# Patient Record
Sex: MALE | Race: WHITE | NOT HISPANIC OR LATINO | Employment: FULL TIME | URBAN - METROPOLITAN AREA
[De-identification: names, ages, dates, MRNs, and addresses within clinical notes are randomized per-mention and may not be internally consistent; named-entity substitution may affect disease eponyms.]

---

## 2018-04-23 ENCOUNTER — OFFICE VISIT (OUTPATIENT)
Dept: PRIMARY CARE | Facility: CLINIC | Age: 63
End: 2018-04-23
Payer: COMMERCIAL

## 2018-04-23 ENCOUNTER — APPOINTMENT (OUTPATIENT)
Dept: LAB | Facility: HOSPITAL | Age: 63
End: 2018-04-23
Attending: INTERNAL MEDICINE
Payer: COMMERCIAL

## 2018-04-23 VITALS
BODY MASS INDEX: 28.2 KG/M2 | HEIGHT: 70 IN | DIASTOLIC BLOOD PRESSURE: 90 MMHG | OXYGEN SATURATION: 99 % | RESPIRATION RATE: 17 BRPM | WEIGHT: 197 LBS | TEMPERATURE: 97.7 F | SYSTOLIC BLOOD PRESSURE: 140 MMHG | HEART RATE: 73 BPM

## 2018-04-23 DIAGNOSIS — E89.0 POSTABLATIVE HYPOTHYROIDISM: ICD-10-CM

## 2018-04-23 DIAGNOSIS — E11.9 DIABETES MELLITUS WITHOUT COMPLICATION (CMS/HCC): Primary | ICD-10-CM

## 2018-04-23 DIAGNOSIS — I10 ESSENTIAL HYPERTENSION: ICD-10-CM

## 2018-04-23 DIAGNOSIS — E11.9 DIABETES MELLITUS WITHOUT COMPLICATION (CMS/HCC): ICD-10-CM

## 2018-04-23 DIAGNOSIS — E78.00 PURE HYPERCHOLESTEROLEMIA: ICD-10-CM

## 2018-04-23 LAB
ALBUMIN SERPL-MCNC: 4.2 G/DL (ref 3.4–5)
ALP SERPL-CCNC: 52 IU/L (ref 35–126)
ALT SERPL-CCNC: 32 IU/L (ref 16–63)
ANION GAP SERPL CALC-SCNC: 8 MEQ/L (ref 3–15)
AST SERPL-CCNC: 33 IU/L (ref 15–41)
BILIRUB SERPL-MCNC: 0.3 MG/DL (ref 0.3–1.2)
BUN SERPL-MCNC: 14 MG/DL (ref 8–20)
CALCIUM SERPL-MCNC: 9.1 MG/DL (ref 8.9–10.3)
CHLORIDE SERPL-SCNC: 105 MMOL/L (ref 98–109)
CHOLEST SERPL-MCNC: 197 MG/DL
CO2 SERPL-SCNC: 28 MMOL/L (ref 22–32)
CREAT SERPL-MCNC: 1 MG/DL (ref 0.8–1.3)
EST. AVERAGE GLUCOSE BLD GHB EST-MCNC: 151 MG/DL
GFR SERPL CREATININE-BSD FRML MDRD: >60 ML/MIN/1.73M*2
GLUCOSE SERPL-MCNC: 94 MG/DL (ref 70–99)
HBA1C MFR BLD HPLC: 6.9 %
HDLC SERPL-MCNC: 35 MG/DL
HDLC SERPL: 5.6 {RATIO}
LDLC SERPL CALC-MCNC: 140 MG/DL
NONHDLC SERPL-MCNC: 162 MG/DL
POTASSIUM SERPL-SCNC: 4.2 MMOL/L (ref 3.6–5.1)
PROT SERPL-MCNC: 7.5 G/DL (ref 6–8.2)
SODIUM SERPL-SCNC: 141 MMOL/L (ref 136–144)
T4 FREE SERPL-MCNC: 1.03 NG/DL (ref 0.58–1.64)
TRIGL SERPL-MCNC: 109 MG/DL (ref 30–149)
TSH SERPL DL<=0.05 MIU/L-ACNC: 4.5 MIU/ML (ref 0.34–5.6)

## 2018-04-23 PROCEDURE — 84439 ASSAY OF FREE THYROXINE: CPT

## 2018-04-23 PROCEDURE — 84443 ASSAY THYROID STIM HORMONE: CPT

## 2018-04-23 PROCEDURE — 80061 LIPID PANEL: CPT

## 2018-04-23 PROCEDURE — 80053 COMPREHEN METABOLIC PANEL: CPT

## 2018-04-23 PROCEDURE — 99214 OFFICE O/P EST MOD 30 MIN: CPT | Performed by: INTERNAL MEDICINE

## 2018-04-23 PROCEDURE — 36415 COLL VENOUS BLD VENIPUNCTURE: CPT

## 2018-04-23 PROCEDURE — 83036 HEMOGLOBIN GLYCOSYLATED A1C: CPT

## 2018-04-23 RX ORDER — AMLODIPINE BESYLATE 10 MG/1
10 TABLET ORAL
COMMUNITY
Start: 2017-11-06 | End: 2018-05-11 | Stop reason: SDUPTHER

## 2018-04-23 RX ORDER — ESOMEPRAZOLE MAGNESIUM 40 MG/1
40 CAPSULE, DELAYED RELEASE ORAL
COMMUNITY
Start: 2017-12-05 | End: 2018-11-09

## 2018-04-23 RX ORDER — LISINOPRIL AND HYDROCHLOROTHIAZIDE 12.5; 2 MG/1; MG/1
TABLET ORAL
COMMUNITY
Start: 2017-12-11 | End: 2018-06-10 | Stop reason: SDUPTHER

## 2018-04-23 RX ORDER — METFORMIN HYDROCHLORIDE 500 MG/1
500 TABLET ORAL
COMMUNITY
Start: 2017-10-19 | End: 2018-08-13 | Stop reason: SDUPTHER

## 2018-04-23 RX ORDER — ESOMEPRAZOLE MAGNESIUM 40 MG/1
40 CAPSULE, DELAYED RELEASE ORAL
COMMUNITY
Start: 2017-12-29 | End: 2018-09-17 | Stop reason: SDUPTHER

## 2018-04-23 RX ORDER — DUTASTERIDE 0.5 MG/1
0.5 CAPSULE, LIQUID FILLED ORAL
COMMUNITY
Start: 2016-07-23 | End: 2018-08-20 | Stop reason: HOSPADM

## 2018-04-23 RX ORDER — LEVOTHYROXINE SODIUM 125 UG/1
TABLET ORAL
COMMUNITY
Start: 2017-11-07 | End: 2018-05-11 | Stop reason: SDUPTHER

## 2018-04-23 ASSESSMENT — ENCOUNTER SYMPTOMS
GASTROINTESTINAL NEGATIVE: 1
ENDOCRINE NEGATIVE: 1
NEUROLOGICAL NEGATIVE: 1
PSYCHIATRIC NEGATIVE: 1
FATIGUE: 0
NERVOUS/ANXIOUS: 0
ALLERGIC/IMMUNOLOGIC NEGATIVE: 1
HEMATOLOGIC/LYMPHATIC NEGATIVE: 1
CONSTIPATION: 0
CONSTITUTIONAL NEGATIVE: 1
DEPRESSED MOOD: 0
DIAPHORESIS: 0
RESPIRATORY NEGATIVE: 1
MUSCULOSKELETAL NEGATIVE: 1
DIARRHEA: 0
CARDIOVASCULAR NEGATIVE: 1
EYES NEGATIVE: 1
DRY SKIN: 0
HYPERTENSION: 1

## 2018-04-23 NOTE — ASSESSMENT & PLAN NOTE
The clinical problem is stable will follow up in office for review of problem. The patient was examined and a full history was performed. The previous lab and Xray tests if available were reviewed.  Will schedule new visit. Patient is aware of the situation and is following up in a normal manner and taking medications as ordered.       I reviewed patients clinical situation. To clarify issues I am ordering labs for this visit. Patient is aware and will contact patient with results.

## 2018-04-23 NOTE — PROGRESS NOTES
Subjective      Patient ID: Rhys Campos is a 62 y.o. male.    Diabetes   He presents for his follow-up diabetic visit. His disease course has been stable. Pertinent negatives for hypoglycemia include no nervousness/anxiousness. Pertinent negatives for diabetes include no fatigue. There are no hypoglycemic complications. There are no diabetic complications. Risk factors for coronary artery disease include diabetes mellitus and dyslipidemia. He is compliant with treatment all of the time. His weight is stable. There is no change in his home blood glucose trend. An ACE inhibitor/angiotensin II receptor blocker is being taken. Eye exam is current.   Hypertension   This is a chronic problem. The current episode started more than 1 year ago. The problem is unchanged. The problem is controlled. Past treatments include ACE inhibitors. The current treatment provides significant improvement. There are no compliance problems.  Hypertensive end-organ damage includes a thyroid problem.   Hyperlipidemia   This is a chronic problem. The current episode started more than 1 year ago. The problem is controlled. There are no known factors aggravating his hyperlipidemia. Current antihyperlipidemic treatment includes diet change. The current treatment provides significant improvement of lipids. There are no compliance problems.    Thyroid Problem   Presents for follow-up visit. Patient reports no anxiety, cold intolerance, constipation, depressed mood, diaphoresis, diarrhea, dry skin or fatigue. The symptoms have been stable. His past medical history is significant for hyperlipidemia.       Vitals:    04/23/18 1411   BP: 140/90   Pulse: 73   Resp: 17   Temp: 36.5 °C (97.7 °F)   SpO2: 99%       The following have been reviewed and updated as appropriate in this visit:  Allergies  Meds  Problems       Review of Systems   Constitutional: Negative.  Negative for diaphoresis and fatigue.   HENT: Negative.    Eyes: Negative.     Respiratory: Negative.    Cardiovascular: Negative.    Gastrointestinal: Negative.  Negative for constipation and diarrhea.   Endocrine: Negative.  Negative for cold intolerance.   Genitourinary: Negative.    Musculoskeletal: Negative.    Skin: Negative.    Allergic/Immunologic: Negative.    Neurological: Negative.    Hematological: Negative.    Psychiatric/Behavioral: Negative.  The patient is not nervous/anxious.    All other systems reviewed and are negative.      Objective     Physical Exam   Constitutional: He is oriented to person, place, and time. He appears well-developed and well-nourished. No distress.   HENT:   Head: Normocephalic and atraumatic.   Right Ear: External ear normal.   Left Ear: External ear normal.   Nose: Nose normal.   Mouth/Throat: Oropharynx is clear and moist.   Eyes: Conjunctivae and EOM are normal. Pupils are equal, round, and reactive to light. Right eye exhibits no discharge. Left eye exhibits no discharge. No scleral icterus.   Neck: Normal range of motion. Neck supple. No JVD present. No tracheal deviation present. No thyromegaly present.   Cardiovascular: Normal rate, regular rhythm, normal heart sounds and intact distal pulses.  Exam reveals no gallop and no friction rub.    No murmur heard.  Pulmonary/Chest: Effort normal and breath sounds normal. No stridor. No respiratory distress. He has no wheezes. He has no rales. He exhibits no tenderness.   Abdominal: Soft. Bowel sounds are normal. He exhibits no distension and no mass. There is no tenderness. There is no rebound and no guarding. No hernia.   Musculoskeletal: Normal range of motion. He exhibits no edema, tenderness or deformity.   Lymphadenopathy:     He has no cervical adenopathy.   Neurological: He is alert and oriented to person, place, and time. He displays normal reflexes. No cranial nerve deficit or sensory deficit. He exhibits normal muscle tone. Coordination normal.   Skin: Skin is warm. He is not diaphoretic.  No erythema. No pallor.   Psychiatric: He has a normal mood and affect.   Nursing note and vitals reviewed.      Assessment/Plan   Problem List Items Addressed This Visit     Diabetes mellitus without complication (CMS/HCC) (HCC) - Primary     The clinical problem is stable will follow up in office for review of problem. The patient was examined and a full history was performed. The previous lab and Xray tests if available were reviewed.  Will schedule new visit. Patient is aware of the situation and is following up in a normal manner and taking medications as ordered.       I reviewed patients clinical situation. To clarify issues I am ordering labs for this visit. Patient is aware and will contact patient with results.            Relevant Medications    metFORMIN (GLUCOPHAGE) 500 mg tablet    Other Relevant Orders    Comprehensive metabolic panel    Hemoglobin A1c    Essential hypertension     BP well controlled.  Doing well         Relevant Medications    amLODIPine (NORVASC) 10 mg tablet    lisinopril-hydrochlorothiazide (PRINZIDE,ZESTORETIC) 20-12.5 mg per tablet    Pure hypercholesterolemia     The clinical problem is stable will follow up in office for review of problem. The patient was examined and a full history was performed. The previous lab and Xray tests if available were reviewed.  Will schedule new visit. Patient is aware of the situation and is following up in a normal manner and taking medications as ordered.     I reviewed patients clinical situation. To clarify issues I am ordering labs for this visit. Patient is aware and will contact patient with results.             Relevant Orders    Lipid panel    Postablative hypothyroidism     Stable on replacement thyroid         Relevant Medications    levothyroxine (SYNTHROID) 125 mcg tablet    Other Relevant Orders    TSH    T4, free

## 2018-05-11 RX ORDER — AMLODIPINE BESYLATE 10 MG/1
TABLET ORAL
Qty: 30 TABLET | Refills: 5 | Status: SHIPPED | OUTPATIENT
Start: 2018-05-11 | End: 2018-11-09 | Stop reason: SDUPTHER

## 2018-05-11 RX ORDER — LEVOTHYROXINE SODIUM 125 UG/1
TABLET ORAL
Qty: 30 TABLET | Refills: 5 | Status: SHIPPED | OUTPATIENT
Start: 2018-05-11 | End: 2018-11-09 | Stop reason: SDUPTHER

## 2018-06-10 RX ORDER — LISINOPRIL AND HYDROCHLOROTHIAZIDE 12.5; 2 MG/1; MG/1
TABLET ORAL
Qty: 30 TABLET | Refills: 5 | OUTPATIENT
Start: 2018-06-10 | End: 2018-06-10 | Stop reason: SDUPTHER

## 2018-06-11 RX ORDER — LISINOPRIL AND HYDROCHLOROTHIAZIDE 12.5; 2 MG/1; MG/1
1 TABLET ORAL DAILY
Qty: 30 TABLET | Refills: 5 | Status: SHIPPED | OUTPATIENT
Start: 2018-06-11 | End: 2018-12-06 | Stop reason: SDUPTHER

## 2018-06-11 RX ORDER — LISINOPRIL AND HYDROCHLOROTHIAZIDE 12.5; 2 MG/1; MG/1
TABLET ORAL
Qty: 30 TABLET | Refills: 5 | Status: SHIPPED | OUTPATIENT
Start: 2018-06-11 | End: 2018-06-11 | Stop reason: SDUPTHER

## 2018-08-01 PROBLEM — E11.9 TYPE 2 DIABETES MELLITUS (CMS/HCC): Status: ACTIVE | Noted: 2017-02-20

## 2018-08-01 PROBLEM — R10.9 ABDOMINAL PAIN: Status: ACTIVE | Noted: 2017-02-24

## 2018-08-02 ENCOUNTER — TELEPHONE (OUTPATIENT)
Dept: PRIMARY CARE | Facility: CLINIC | Age: 63
End: 2018-08-02

## 2018-08-02 RX ORDER — AZITHROMYCIN 250 MG/1
TABLET, FILM COATED ORAL
Qty: 6 TABLET | Refills: 0 | Status: SHIPPED | OUTPATIENT
Start: 2018-08-02 | End: 2018-08-20 | Stop reason: HOSPADM

## 2018-08-02 NOTE — TELEPHONE ENCOUNTER
PT HAS REALLY BAD SORE THROAT, RUNNING FEVER AND ACHY BODY, CAN YOU PLEASE CALL HIM IN SOMETHING? NEGRITA

## 2018-08-10 ENCOUNTER — TELEPHONE (OUTPATIENT)
Dept: PRIMARY CARE | Facility: CLINIC | Age: 63
End: 2018-08-10

## 2018-08-10 ENCOUNTER — OFFICE VISIT (OUTPATIENT)
Dept: PRIMARY CARE | Facility: CLINIC | Age: 63
End: 2018-08-10
Payer: COMMERCIAL

## 2018-08-10 ENCOUNTER — APPOINTMENT (OUTPATIENT)
Dept: LAB | Facility: HOSPITAL | Age: 63
End: 2018-08-10
Attending: INTERNAL MEDICINE
Payer: COMMERCIAL

## 2018-08-10 VITALS
HEIGHT: 70 IN | HEART RATE: 64 BPM | TEMPERATURE: 97.9 F | DIASTOLIC BLOOD PRESSURE: 80 MMHG | RESPIRATION RATE: 17 BRPM | OXYGEN SATURATION: 95 % | WEIGHT: 193 LBS | BODY MASS INDEX: 27.63 KG/M2 | SYSTOLIC BLOOD PRESSURE: 140 MMHG

## 2018-08-10 DIAGNOSIS — I10 ESSENTIAL HYPERTENSION: ICD-10-CM

## 2018-08-10 DIAGNOSIS — R35.1 BENIGN PROSTATIC HYPERPLASIA WITH NOCTURIA: ICD-10-CM

## 2018-08-10 DIAGNOSIS — E11.9 DIABETES MELLITUS WITHOUT COMPLICATION (CMS/HCC): ICD-10-CM

## 2018-08-10 DIAGNOSIS — E89.0 POSTABLATIVE HYPOTHYROIDISM: ICD-10-CM

## 2018-08-10 DIAGNOSIS — N40.1 BENIGN PROSTATIC HYPERPLASIA WITH NOCTURIA: ICD-10-CM

## 2018-08-10 DIAGNOSIS — E05.00 GRAVES DISEASE: ICD-10-CM

## 2018-08-10 DIAGNOSIS — R79.89 ABNORMAL LFTS: Primary | ICD-10-CM

## 2018-08-10 DIAGNOSIS — N40.1 BENIGN PROSTATIC HYPERPLASIA WITH NOCTURIA: Primary | ICD-10-CM

## 2018-08-10 DIAGNOSIS — R35.1 BENIGN PROSTATIC HYPERPLASIA WITH NOCTURIA: Primary | ICD-10-CM

## 2018-08-10 LAB
ALBUMIN SERPL-MCNC: 4 G/DL (ref 3.4–5)
ALP SERPL-CCNC: 68 IU/L (ref 35–126)
ALT SERPL-CCNC: 126 IU/L (ref 16–63)
ANION GAP SERPL CALC-SCNC: 8 MEQ/L (ref 3–15)
AST SERPL-CCNC: 65 IU/L (ref 15–41)
BASOPHILS # BLD: 0.04 K/UL (ref 0.01–0.1)
BASOPHILS NFR BLD: 0.5 %
BILIRUB SERPL-MCNC: 0.4 MG/DL (ref 0.3–1.2)
BUN SERPL-MCNC: 16 MG/DL (ref 8–20)
CALCIUM SERPL-MCNC: 9.1 MG/DL (ref 8.9–10.3)
CHLORIDE SERPL-SCNC: 103 MMOL/L (ref 98–109)
CHOLEST SERPL-MCNC: 169 MG/DL
CO2 SERPL-SCNC: 29 MMOL/L (ref 22–32)
CREAT SERPL-MCNC: 1 MG/DL (ref 0.8–1.3)
DIFFERENTIAL METHOD BLD: NORMAL
EOSINOPHIL # BLD: 0.33 K/UL (ref 0.04–0.54)
EOSINOPHIL NFR BLD: 4.3 %
ERYTHROCYTE [DISTWIDTH] IN BLOOD BY AUTOMATED COUNT: 12.9 % (ref 11.6–14.4)
EST. AVERAGE GLUCOSE BLD GHB EST-MCNC: 160 MG/DL
GFR SERPL CREATININE-BSD FRML MDRD: >60 ML/MIN/1.73M*2
GLUCOSE SERPL-MCNC: 148 MG/DL (ref 70–99)
HBA1C MFR BLD HPLC: 7.2 %
HCT VFR BLDCO AUTO: 40.2 % (ref 40.1–51)
HDLC SERPL-MCNC: 33 MG/DL
HDLC SERPL: 5.1 {RATIO}
HGB BLD-MCNC: 14 G/DL (ref 13.7–17.5)
IMM GRANULOCYTES # BLD AUTO: 0.03 K/UL (ref 0–0.08)
IMM GRANULOCYTES NFR BLD AUTO: 0.4 %
LDLC SERPL CALC-MCNC: 102 MG/DL
LYMPHOCYTES # BLD: 2.38 K/UL (ref 1.2–3.5)
LYMPHOCYTES NFR BLD: 31.3 %
MCH RBC QN AUTO: 30.8 PG (ref 28–33.2)
MCHC RBC AUTO-ENTMCNC: 34.8 G/DL (ref 32.2–36.5)
MCV RBC AUTO: 88.4 FL (ref 83–98)
MONOCYTES # BLD: 0.56 K/UL (ref 0.3–1)
MONOCYTES NFR BLD: 7.4 %
NEUTROPHILS # BLD: 4.27 K/UL (ref 1.7–7)
NEUTS SEG NFR BLD: 56.1 %
NONHDLC SERPL-MCNC: 136 MG/DL
NRBC BLD-RTO: 0 %
PDW BLD AUTO: 12.8 FL (ref 9.4–12.4)
PLATELET # BLD AUTO: 158 K/UL (ref 150–350)
POTASSIUM SERPL-SCNC: 4.2 MMOL/L (ref 3.6–5.1)
PROT SERPL-MCNC: 7.3 G/DL (ref 6–8.2)
PSA SERPL-MCNC: 3.41 NG/ML
RBC # BLD AUTO: 4.55 M/UL (ref 4.5–5.8)
SODIUM SERPL-SCNC: 140 MMOL/L (ref 136–144)
T4 FREE SERPL-MCNC: 1.04 NG/DL (ref 0.58–1.64)
TRIGL SERPL-MCNC: 172 MG/DL (ref 30–149)
TSH SERPL DL<=0.05 MIU/L-ACNC: 4.46 MIU/ML (ref 0.34–5.6)
WBC # BLD AUTO: 7.61 K/UL (ref 3.8–10.5)

## 2018-08-10 PROCEDURE — 84439 ASSAY OF FREE THYROXINE: CPT

## 2018-08-10 PROCEDURE — 84443 ASSAY THYROID STIM HORMONE: CPT

## 2018-08-10 PROCEDURE — 83036 HEMOGLOBIN GLYCOSYLATED A1C: CPT

## 2018-08-10 PROCEDURE — 99214 OFFICE O/P EST MOD 30 MIN: CPT | Performed by: INTERNAL MEDICINE

## 2018-08-10 PROCEDURE — 85025 COMPLETE CBC W/AUTO DIFF WBC: CPT

## 2018-08-10 PROCEDURE — 80053 COMPREHEN METABOLIC PANEL: CPT

## 2018-08-10 PROCEDURE — 36415 COLL VENOUS BLD VENIPUNCTURE: CPT

## 2018-08-10 PROCEDURE — 84153 ASSAY OF PSA TOTAL: CPT

## 2018-08-10 PROCEDURE — 80061 LIPID PANEL: CPT

## 2018-08-10 ASSESSMENT — ENCOUNTER SYMPTOMS
ALLERGIC/IMMUNOLOGIC NEGATIVE: 1
EYES NEGATIVE: 1
HEMATOLOGIC/LYMPHATIC NEGATIVE: 1
GASTROINTESTINAL NEGATIVE: 1
PSYCHIATRIC NEGATIVE: 1
RESPIRATORY NEGATIVE: 1
CARDIOVASCULAR NEGATIVE: 1
DIAPHORESIS: 0
DIARRHEA: 0
NERVOUS/ANXIOUS: 0
NEUROLOGICAL NEGATIVE: 1
MUSCULOSKELETAL NEGATIVE: 1
DEPRESSED MOOD: 0
DRY SKIN: 0
CONSTITUTIONAL NEGATIVE: 1
HYPERTENSION: 1
ENDOCRINE NEGATIVE: 1
CONSTIPATION: 0

## 2018-08-10 NOTE — TELEPHONE ENCOUNTER
----- Message from Romain Kat MD sent at 8/10/2018  1:55 PM EDT -----  Can we please get him a slip for an US of his liver. Thanks

## 2018-08-10 NOTE — PROGRESS NOTES
Subjective      Patient ID: Rhys Campos is a 63 y.o. male.    Post ablative hypothyrodism  Doing well      Diabetes   He presents for his follow-up diabetic visit. He has type 2 diabetes mellitus. His disease course has been stable. Pertinent negatives for hypoglycemia include no nervousness/anxiousness.   Hypertension   This is a chronic problem. The current episode started more than 1 year ago. The problem is unchanged. The problem is controlled. The current treatment provides significant improvement. Identifiable causes of hypertension include a thyroid problem.   Benign Prostatic Hypertrophy   This is a chronic problem. The current episode started more than 1 year ago. The problem is unchanged.   Thyroid Problem   Presents for follow-up visit. Patient reports no anxiety, cold intolerance, constipation, depressed mood, diaphoresis, diarrhea or dry skin. The symptoms have been stable.       Vitals:    08/10/18 0953   BP: 140/80   Pulse: 64   Resp: 17   Temp: 36.6 °C (97.9 °F)   SpO2: 95%       The following have been reviewed and updated as appropriate in this visit:       Review of Systems   Constitutional: Negative.  Negative for diaphoresis.   HENT: Negative.    Eyes: Negative.    Respiratory: Negative.    Cardiovascular: Negative.    Gastrointestinal: Negative.  Negative for constipation and diarrhea.   Endocrine: Negative.  Negative for cold intolerance.   Genitourinary: Negative.    Musculoskeletal: Negative.    Skin: Negative.    Allergic/Immunologic: Negative.    Neurological: Negative.    Hematological: Negative.    Psychiatric/Behavioral: Negative.  The patient is not nervous/anxious.    All other systems reviewed and are negative.      Objective     Physical Exam   Constitutional: He is oriented to person, place, and time. He appears well-developed and well-nourished. No distress.   HENT:   Head: Normocephalic and atraumatic.   Right Ear: External ear normal.   Left Ear: External ear normal.   Nose:  Nose normal.   Mouth/Throat: Oropharynx is clear and moist.   Eyes: Conjunctivae and EOM are normal. Pupils are equal, round, and reactive to light. Right eye exhibits no discharge. Left eye exhibits no discharge. No scleral icterus.   Neck: Normal range of motion. Neck supple. No JVD present. No tracheal deviation present. No thyromegaly present.   Cardiovascular: Normal rate, regular rhythm, normal heart sounds and intact distal pulses.  Exam reveals no gallop and no friction rub.    No murmur heard.  Pulmonary/Chest: Effort normal and breath sounds normal. No stridor. No respiratory distress. He has no wheezes. He has no rales. He exhibits no tenderness.   Abdominal: Soft. Bowel sounds are normal. He exhibits no distension and no mass. There is no tenderness. There is no rebound and no guarding. No hernia.   Musculoskeletal: Normal range of motion. He exhibits no edema, tenderness or deformity.   Lymphadenopathy:     He has no cervical adenopathy.   Neurological: He is alert and oriented to person, place, and time. He displays normal reflexes. No cranial nerve deficit or sensory deficit. He exhibits normal muscle tone. Coordination normal.   Skin: Skin is warm. He is not diaphoretic. No erythema. No pallor.   Psychiatric: He has a normal mood and affect.   Nursing note and vitals reviewed.      Assessment/Plan   Problem List Items Addressed This Visit     Diabetes mellitus without complication (CMS/HCC) ([facility])     I reviewed patients clinical situation. To clarify issues I am ordering labs for this visit. Patient is aware and will contact patient with results.            Relevant Orders    Comprehensive metabolic panel    Hemoglobin A1c    Lipid panel    Microalbumin / creatinine urine ratio    CBC and differential    Hypertension     I reviewed patients clinical situation. To clarify issues I am ordering labs for this visit. Patient is aware and will contact patient with results.             Postablative  hypothyroidism     Continue meds         BPH (benign prostatic hyperplasia) - Primary     Stable    Check labs         Relevant Orders    PSA    Urinalysis with Reflex Culture    Graves disease     I reviewed patients clinical situation. To clarify issues I am ordering labs for this visit. Patient is aware and will contact patient with results.             Relevant Orders    TSH    T4, free    CBC and differential

## 2018-08-10 NOTE — ASSESSMENT & PLAN NOTE
I reviewed patients clinical situation. To clarify issues I am ordering labs for this visit. Patient is aware and will contact patient with results.

## 2018-08-14 DIAGNOSIS — R79.89 LFTS ABNORMAL: ICD-10-CM

## 2018-08-14 DIAGNOSIS — R10.11 RIGHT UPPER QUADRANT ABDOMINAL PAIN: Primary | ICD-10-CM

## 2018-08-14 RX ORDER — METFORMIN HYDROCHLORIDE 500 MG/1
TABLET ORAL
Qty: 90 TABLET | Refills: 1 | Status: SHIPPED | OUTPATIENT
Start: 2018-08-14 | End: 2018-08-20 | Stop reason: HOSPADM

## 2018-08-17 ENCOUNTER — HOSPITAL ENCOUNTER (OUTPATIENT)
Dept: RADIOLOGY | Age: 63
Discharge: HOME | End: 2018-08-17
Attending: INTERNAL MEDICINE
Payer: COMMERCIAL

## 2018-08-17 ENCOUNTER — TELEPHONE (OUTPATIENT)
Dept: PRIMARY CARE | Facility: CLINIC | Age: 63
End: 2018-08-17

## 2018-08-17 ENCOUNTER — DOCUMENTATION (OUTPATIENT)
Dept: PRIMARY CARE | Facility: CLINIC | Age: 63
End: 2018-08-17

## 2018-08-17 DIAGNOSIS — R79.89 LFTS ABNORMAL: ICD-10-CM

## 2018-08-17 DIAGNOSIS — R10.11 RIGHT UPPER QUADRANT ABDOMINAL PAIN: ICD-10-CM

## 2018-08-17 PROCEDURE — 76705 ECHO EXAM OF ABDOMEN: CPT

## 2018-08-20 ENCOUNTER — TELEPHONE (OUTPATIENT)
Dept: TRANSFER UNIT | Age: 63
End: 2018-08-20

## 2018-08-20 DIAGNOSIS — R79.89 ABNORMAL LFTS: Primary | ICD-10-CM

## 2018-08-20 DIAGNOSIS — K80.50 CHOLEDOCHOLITHIASIS: ICD-10-CM

## 2018-08-20 NOTE — TELEPHONE ENCOUNTER
----- Message from Romain Kat MD sent at 8/20/2018 10:51 AM EDT -----  He needs an MRCP of his common duct. He has an abnormal US. Can we get him the slip. charisma

## 2018-08-24 ENCOUNTER — HOSPITAL ENCOUNTER (OUTPATIENT)
Dept: RADIOLOGY | Facility: HOSPITAL | Age: 63
Discharge: HOME | End: 2018-08-24
Attending: INTERNAL MEDICINE
Payer: COMMERCIAL

## 2018-08-24 VITALS — BODY MASS INDEX: 28.7 KG/M2 | WEIGHT: 200 LBS

## 2018-08-24 DIAGNOSIS — K80.50 CHOLEDOCHOLITHIASIS: ICD-10-CM

## 2018-08-24 DIAGNOSIS — R79.89 ABNORMAL LFTS: ICD-10-CM

## 2018-08-24 PROCEDURE — 74183 MRI ABD W/O CNTR FLWD CNTR: CPT

## 2018-08-24 PROCEDURE — A9579 GAD-BASE MR CONTRAST NOS,1ML: HCPCS | Mod: JW | Performed by: INTERNAL MEDICINE

## 2018-08-24 PROCEDURE — A9575 INJ GADOTERATE MEGLUMI 0.1ML: HCPCS | Mod: JW | Performed by: INTERNAL MEDICINE

## 2018-08-24 RX ORDER — GADOTERATE MEGLUMINE 376.9 MG/ML
18 INJECTION INTRAVENOUS ONCE
Status: COMPLETED | OUTPATIENT
Start: 2018-08-24 | End: 2018-08-24

## 2018-08-24 RX ADMIN — GADOTERATE MEGLUMINE 18 ML: 376.9 INJECTION INTRAVENOUS at 08:22

## 2018-08-28 ENCOUNTER — TELEPHONE (OUTPATIENT)
Dept: PRIMARY CARE | Facility: CLINIC | Age: 63
End: 2018-08-28

## 2018-09-07 ENCOUNTER — TELEPHONE (OUTPATIENT)
Dept: PRIMARY CARE | Facility: CLINIC | Age: 63
End: 2018-09-07

## 2018-09-07 RX ORDER — DOXYCYCLINE HYCLATE 100 MG
100 TABLET ORAL 2 TIMES DAILY
Qty: 28 TABLET | Refills: 0 | Status: SHIPPED | OUTPATIENT
Start: 2018-09-07 | End: 2018-11-09

## 2018-09-17 PROBLEM — K21.9 GASTROESOPHAGEAL REFLUX DISEASE WITHOUT ESOPHAGITIS: Status: ACTIVE | Noted: 2018-09-17

## 2018-09-17 RX ORDER — ESOMEPRAZOLE MAGNESIUM 40 MG/1
40 CAPSULE, DELAYED RELEASE ORAL
Qty: 90 CAPSULE | Refills: 5 | Status: SHIPPED | OUTPATIENT
Start: 2018-09-17 | End: 2019-01-17

## 2018-10-02 ENCOUNTER — APPOINTMENT (OUTPATIENT)
Dept: LAB | Age: 63
End: 2018-10-02
Attending: INTERNAL MEDICINE
Payer: COMMERCIAL

## 2018-10-02 ENCOUNTER — TRANSCRIBE ORDERS (OUTPATIENT)
Dept: LAB | Age: 63
End: 2018-10-02

## 2018-10-02 DIAGNOSIS — Z12.11 ENCOUNTER FOR SCREENING FOR MALIGNANT NEOPLASM OF COLON: ICD-10-CM

## 2018-10-02 DIAGNOSIS — R10.13 EPIGASTRIC PAIN: ICD-10-CM

## 2018-10-02 DIAGNOSIS — Z12.11 ENCOUNTER FOR SCREENING FOR MALIGNANT NEOPLASM OF COLON: Primary | ICD-10-CM

## 2018-10-02 DIAGNOSIS — R94.5 ABNORMAL RESULTS OF LIVER FUNCTION STUDIES: ICD-10-CM

## 2018-10-02 LAB
ALBUMIN SERPL-MCNC: 3.7 G/DL (ref 3.4–5)
ALP SERPL-CCNC: 55 IU/L (ref 35–126)
ALT SERPL-CCNC: 22 IU/L (ref 16–63)
ANION GAP SERPL CALC-SCNC: 9 MEQ/L (ref 3–15)
AST SERPL-CCNC: 28 IU/L (ref 15–41)
BILIRUB SERPL-MCNC: 0.4 MG/DL (ref 0.3–1.2)
BUN SERPL-MCNC: 12 MG/DL (ref 8–20)
CALCIUM SERPL-MCNC: 9.3 MG/DL (ref 8.9–10.3)
CHLORIDE SERPL-SCNC: 107 MEQ/L (ref 98–109)
CO2 SERPL-SCNC: 26 MEQ/L (ref 22–32)
CREAT SERPL-MCNC: 1 MG/DL (ref 0.8–1.3)
GFR SERPL CREATININE-BSD FRML MDRD: >60 ML/MIN/1.73M*2
GGT SERPL-CCNC: 32 IU/L (ref 7–50)
GLUCOSE SERPL-MCNC: 151 MG/DL (ref 70–99)
POTASSIUM SERPL-SCNC: 3.8 MEQ/L (ref 3.6–5.1)
PROT SERPL-MCNC: 6.5 G/DL (ref 6–8.2)
SODIUM SERPL-SCNC: 142 MEQ/L (ref 136–144)

## 2018-10-02 PROCEDURE — 36415 COLL VENOUS BLD VENIPUNCTURE: CPT

## 2018-10-02 PROCEDURE — 82103 ALPHA-1-ANTITRYPSIN TOTAL: CPT

## 2018-10-02 PROCEDURE — 86255 FLUORESCENT ANTIBODY SCREEN: CPT

## 2018-10-02 PROCEDURE — 82040 ASSAY OF SERUM ALBUMIN: CPT

## 2018-10-02 PROCEDURE — 82977 ASSAY OF GGT: CPT

## 2018-10-02 PROCEDURE — 86708 HEPATITIS A ANTIBODY: CPT

## 2018-10-02 PROCEDURE — 86706 HEP B SURFACE ANTIBODY: CPT

## 2018-10-02 PROCEDURE — 82390 ASSAY OF CERULOPLASMIN: CPT

## 2018-10-02 PROCEDURE — 86704 HEP B CORE ANTIBODY TOTAL: CPT

## 2018-10-02 PROCEDURE — 87389 HIV-1 AG W/HIV-1&-2 AB AG IA: CPT

## 2018-10-03 LAB
CERULOPLASMIN SERPL-MCNC: 31.8 MG/DL (ref 22–58)
HAV AB SER QL IA: NORMAL
HBV CORE AB SER QL: NONREACTIVE
HBV SURFACE AB SER QL: NONREACTIVE
SMOOTH MUSCLE AB SER QL IF: NEGATIVE

## 2018-10-04 LAB — A1AT SERPL-MCNC: 145 MG/DL (ref 83–199)

## 2018-11-09 ENCOUNTER — OFFICE VISIT (OUTPATIENT)
Dept: PRIMARY CARE | Facility: CLINIC | Age: 63
End: 2018-11-09
Payer: COMMERCIAL

## 2018-11-09 VITALS
RESPIRATION RATE: 16 BRPM | OXYGEN SATURATION: 96 % | HEART RATE: 76 BPM | HEIGHT: 70 IN | BODY MASS INDEX: 27.37 KG/M2 | WEIGHT: 191.2 LBS | DIASTOLIC BLOOD PRESSURE: 80 MMHG | SYSTOLIC BLOOD PRESSURE: 140 MMHG | TEMPERATURE: 97.8 F

## 2018-11-09 DIAGNOSIS — E78.00 PURE HYPERCHOLESTEROLEMIA: ICD-10-CM

## 2018-11-09 DIAGNOSIS — I10 ESSENTIAL HYPERTENSION: ICD-10-CM

## 2018-11-09 DIAGNOSIS — E11.9 TYPE 2 DIABETES MELLITUS WITHOUT COMPLICATION, WITHOUT LONG-TERM CURRENT USE OF INSULIN (CMS/HCC): Primary | ICD-10-CM

## 2018-11-09 PROCEDURE — 90686 IIV4 VACC NO PRSV 0.5 ML IM: CPT | Performed by: INTERNAL MEDICINE

## 2018-11-09 PROCEDURE — 90471 IMMUNIZATION ADMIN: CPT | Performed by: INTERNAL MEDICINE

## 2018-11-09 PROCEDURE — 99214 OFFICE O/P EST MOD 30 MIN: CPT | Mod: 25 | Performed by: INTERNAL MEDICINE

## 2018-11-09 RX ORDER — LANSOPRAZOLE, AMOXICILLIN, CLARITHROMYCIN 30-500-500
KIT ORAL
COMMUNITY
Start: 2018-11-06 | End: 2018-11-09

## 2018-11-09 RX ORDER — POLYETHYLENE GLYCOL 3350, SODIUM SULFATE, SODIUM CHLORIDE, POTASSIUM CHLORIDE, ASCORBIC ACID, SODIUM ASCORBATE 7.5-2.691G
KIT ORAL
COMMUNITY
Start: 2018-10-04 | End: 2019-01-17

## 2018-11-09 RX ORDER — METFORMIN HYDROCHLORIDE 1000 MG/1
1000 TABLET, FILM COATED, EXTENDED RELEASE ORAL
Qty: 90 TABLET | Refills: 3 | Status: SHIPPED | OUTPATIENT
Start: 2018-11-09 | End: 2019-01-17

## 2018-11-09 ASSESSMENT — ENCOUNTER SYMPTOMS
FATIGUE: 0
ABDOMINAL PAIN: 0
DIABETIC ASSOCIATED SYMPTOMS: 0
SHORTNESS OF BREATH: 0
HEADACHES: 0

## 2018-11-09 ASSESSMENT — PAIN SCALES - GENERAL: PAINLEVEL: 0-NO PAIN

## 2018-11-09 NOTE — PROGRESS NOTES
"  Subjective     Patient ID: Rhys Campos is a 63 y.o. male.    Pt not taking his metformin: \"I have no sx's\".       Diabetes   He presents for his follow-up diabetic visit. He has type 2 diabetes mellitus. Disease course: uncontrolled. There are no hypoglycemic associated symptoms. Pertinent negatives for hypoglycemia include no headaches. There are no diabetic associated symptoms. Pertinent negatives for diabetes include no chest pain and no fatigue.       Review of Systems   Constitutional: Negative for fatigue.   Respiratory: Negative for shortness of breath.    Cardiovascular: Negative for chest pain.   Gastrointestinal: Negative for abdominal pain.   Neurological: Negative for headaches.       Objective     Vitals:    11/09/18 0938   BP: 140/80   BP Location: Right upper arm   Patient Position: Sitting   Pulse: 76   Resp: 16   Temp: 36.6 °C (97.8 °F)   TempSrc: Oral   SpO2: 96%   Weight: 86.7 kg (191 lb 3.2 oz)   Height: 1.778 m (5' 10\")     Body mass index is 27.43 kg/m².    Physical Exam   Constitutional: He is oriented to person, place, and time. He appears well-developed and well-nourished. No distress.   Cardiovascular: Normal rate, regular rhythm, normal heart sounds and intact distal pulses.    No murmur heard.  Pulmonary/Chest: Effort normal and breath sounds normal. No respiratory distress. He has no wheezes. He has no rales.   Abdominal: Soft. Bowel sounds are normal. He exhibits no distension. There is no tenderness.   Neurological: He is alert and oriented to person, place, and time.   Skin: Skin is warm and dry. He is not diaphoretic.       Assessment/Plan   Problem List Items Addressed This Visit     Essential hypertension     Stable. Pt notes BP runs 125/80 at times.   Diet and exercise. Discussed with patient.  Continue current medications.         Pure hypercholesterolemia     Stable.  Follow clinically.  Diet and exercise.         Type 2 diabetes mellitus without complication, without " long-term current use of insulin (CMS/[facility]) - Primary     Labs reviewed. A1C is high. D/w'd o/r/b at long length w pt. Pt fully understands.   Start Metformin ER (pt wants once daily med).   Diet and exercise.          Relevant Medications    metFORMIN (GLUMETZA) 1,000 mg 24 hr tablet

## 2018-11-09 NOTE — ASSESSMENT & PLAN NOTE
Labs reviewed. A1C is high. D/w'd o/r/b at long length w pt. Pt fully understands.   Start Metformin ER (pt wants once daily med).   Diet and exercise.

## 2018-11-09 NOTE — ASSESSMENT & PLAN NOTE
Stable. Pt notes BP runs 125/80 at times.   Diet and exercise. Discussed with patient.  Continue current medications.

## 2018-12-03 ENCOUNTER — APPOINTMENT (OUTPATIENT)
Dept: LAB | Age: 63
End: 2018-12-03
Attending: INTERNAL MEDICINE
Payer: COMMERCIAL

## 2018-12-03 ENCOUNTER — TRANSCRIBE ORDERS (OUTPATIENT)
Dept: REGISTRATION | Age: 63
End: 2018-12-03

## 2018-12-03 DIAGNOSIS — A04.8 OTHER SPECIFIED BACTERIAL INTESTINAL INFECTIONS: ICD-10-CM

## 2018-12-03 DIAGNOSIS — A04.8 OTHER SPECIFIED BACTERIAL INTESTINAL INFECTIONS: Primary | ICD-10-CM

## 2018-12-03 LAB — HCV AB SER QL: NONREACTIVE

## 2018-12-03 PROCEDURE — 86803 HEPATITIS C AB TEST: CPT

## 2018-12-03 PROCEDURE — 83013 H PYLORI (C-13) BREATH: CPT

## 2018-12-03 PROCEDURE — 36415 COLL VENOUS BLD VENIPUNCTURE: CPT

## 2018-12-04 LAB — UREA BREATH TEST QL: NOT DETECTED

## 2018-12-06 RX ORDER — LISINOPRIL AND HYDROCHLOROTHIAZIDE 12.5; 2 MG/1; MG/1
TABLET ORAL
Qty: 30 TABLET | Refills: 5 | Status: SHIPPED | OUTPATIENT
Start: 2018-12-06 | End: 2018-12-06 | Stop reason: SDUPTHER

## 2019-01-17 ENCOUNTER — TELEPHONE (OUTPATIENT)
Dept: FAMILY MEDICINE | Facility: CLINIC | Age: 64
End: 2019-01-17

## 2019-01-17 ENCOUNTER — OFFICE VISIT (OUTPATIENT)
Dept: FAMILY MEDICINE | Facility: CLINIC | Age: 64
End: 2019-01-17
Payer: COMMERCIAL

## 2019-01-17 VITALS
WEIGHT: 189 LBS | TEMPERATURE: 97.8 F | HEART RATE: 78 BPM | BODY MASS INDEX: 27.06 KG/M2 | SYSTOLIC BLOOD PRESSURE: 138 MMHG | HEIGHT: 70 IN | DIASTOLIC BLOOD PRESSURE: 74 MMHG | RESPIRATION RATE: 14 BRPM

## 2019-01-17 DIAGNOSIS — K76.0 FATTY LIVER DISEASE, NONALCOHOLIC: ICD-10-CM

## 2019-01-17 DIAGNOSIS — E11.9 TYPE 2 DIABETES MELLITUS WITHOUT COMPLICATION, WITHOUT LONG-TERM CURRENT USE OF INSULIN (CMS/HCC): Primary | ICD-10-CM

## 2019-01-17 DIAGNOSIS — E78.00 PURE HYPERCHOLESTEROLEMIA: ICD-10-CM

## 2019-01-17 DIAGNOSIS — I10 ESSENTIAL HYPERTENSION: ICD-10-CM

## 2019-01-17 DIAGNOSIS — Z72.0 TOBACCO ABUSE: ICD-10-CM

## 2019-01-17 DIAGNOSIS — E89.0 POSTABLATIVE HYPOTHYROIDISM: ICD-10-CM

## 2019-01-17 PROBLEM — K80.20 CHOLELITHIASIS: Status: ACTIVE | Noted: 2019-01-17

## 2019-01-17 PROBLEM — R10.9 ABDOMINAL PAIN: Status: RESOLVED | Noted: 2017-02-24 | Resolved: 2019-01-17

## 2019-01-17 PROBLEM — K63.5 COLON POLYPS: Status: ACTIVE | Noted: 2019-01-17

## 2019-01-17 PROCEDURE — 99214 OFFICE O/P EST MOD 30 MIN: CPT | Performed by: FAMILY MEDICINE

## 2019-01-17 PROCEDURE — 36415 COLL VENOUS BLD VENIPUNCTURE: CPT | Performed by: FAMILY MEDICINE

## 2019-01-17 RX ORDER — METFORMIN HYDROCHLORIDE 500 MG/1
TABLET ORAL
COMMUNITY
Start: 2019-01-12 | End: 2019-01-17

## 2019-01-17 ASSESSMENT — ENCOUNTER SYMPTOMS
LIGHT-HEADEDNESS: 0
FATIGUE: 0
UNEXPECTED WEIGHT CHANGE: 0
NAUSEA: 0
VOMITING: 0
ABDOMINAL PAIN: 0
DIZZINESS: 0
CHEST TIGHTNESS: 0
SHORTNESS OF BREATH: 0
PALPITATIONS: 0
POLYDIPSIA: 0

## 2019-01-17 NOTE — TELEPHONE ENCOUNTER
Please call [facility] to obtain colonoscopy and endoscopy reports from August.  Thank you.

## 2019-01-17 NOTE — ASSESSMENT & PLAN NOTE
-He is on interested in any cessation options today  -Encouraged to cut down  -He is aware of potential side effects of lung cancer and multiple forms of CV disease

## 2019-01-17 NOTE — PROGRESS NOTES
"Subjective      Patient ID: Rhys Campos is a 63 y.o. male.  1955      Here as NP to establish care    DM  - a1c >6.5 x2- 7.2 in 8/2018  - never started metformin   - denies n/v, abd pain, urinary frequency, polydipsia, visual changes  - bagel every day for breakfast, lots of candies, pasta for lunch  - exercises 3 days/wk- weights, treadmill x10 mins- no sob, cp, dizziness.     Transaminitis   - in 8/2018  - normalized 10/2018  - US with fatty liver dz in 8/2018. MRCP with cholelithiasis only, but no ductal dilatations or stones.  - no etoh        The following have been reviewed and updated as appropriate in this visit:  Tobacco  Allergies  Meds  Problems  Med Hx  Surg Hx  Fam Hx       Review of Systems   Constitutional: Negative for fatigue and unexpected weight change.   Eyes: Negative for visual disturbance.   Respiratory: Negative for chest tightness and shortness of breath.    Cardiovascular: Negative for chest pain, palpitations and leg swelling.   Gastrointestinal: Negative for abdominal pain, nausea and vomiting.   Endocrine: Negative for polydipsia and polyuria.   Neurological: Negative for dizziness, syncope and light-headedness.       Objective     Vitals:    01/17/19 1419 01/17/19 1502   BP: 132/78 138/74   BP Location:  Left upper arm   Patient Position:  Sitting   Pulse: 78    Resp: 14    Temp: 36.6 °C (97.8 °F)    Weight: 85.7 kg (189 lb)    Height: 1.778 m (5' 10\")      Body mass index is 27.12 kg/m².    Physical Exam   Constitutional: He is oriented to person, place, and time. He appears well-developed and well-nourished. No distress.   HENT:   Head: Normocephalic and atraumatic.   Mouth/Throat: Oropharynx is clear and moist.   Eyes: No scleral icterus.   Mild exophthalmos   Cardiovascular: Normal rate, regular rhythm and normal heart sounds.  Exam reveals no gallop and no friction rub.    No murmur heard.  Pulmonary/Chest: Effort normal and breath sounds normal. No respiratory " distress. He has no wheezes. He has no rales.   Abdominal: Soft. Bowel sounds are normal. He exhibits no distension and no mass. There is no tenderness. There is no rebound and no guarding.   Musculoskeletal: He exhibits no edema.   Neurological: He is alert and oriented to person, place, and time.   Skin: He is not diaphoretic.   Psychiatric: He has a normal mood and affect. His behavior is normal. Judgment and thought content normal.       Assessment/Plan   Problem List Items Addressed This Visit        Other    Essential hypertension     -Within goal of <140/90 by visits and  -Continue current 2 medication regimen         Pure hypercholesterolemia     - trend and calc CVD risk         Relevant Orders    Comprehensive metabolic panel    Lipid panel    Postablative hypothyroidism     - clinically euthyroid-> repeat TSH today         Relevant Orders    TSH 3rd Generation    Type 2 diabetes mellitus without complication, without long-term current use of insulin (CMS/[facility]) - Primary     - dx 8/2018 after a1c 6.9 and then 7.2  - yet to start metformin   - repeat today   - specific focus on breakfast carbohydrates and sweets (candies)         Relevant Orders    Hemoglobin A1c    Microalbumin/Creatinine Ur Random    Fatty liver disease, nonalcoholic     - LFTs normalized 10/2018-> repeat today          Tobacco abuse     -He is on interested in any cessation options today  -Encouraged to cut down  -He is aware of potential side effects of lung cancer and multiple forms of CV disease

## 2019-01-17 NOTE — ASSESSMENT & PLAN NOTE
- dx 8/2018 after a1c 6.9 and then 7.2  - yet to start metformin   - repeat today   - specific focus on breakfast carbohydrates and sweets (candies)

## 2019-01-17 NOTE — PATIENT INSTRUCTIONS
Great to meet you today.  Welcome to the practice.  Continue to avoid adding salt to foods and keep your sodium level <2000 mg/day.    Based on the last 2 blood results, you have diabetes.  Repeat blood test today.  Reduce intake of carbohydrates (including bagels) and sweets.    Call [phone] ([phone]) for helpful tips and suggestions for smoking cessation.   Let me know when you are ready to discuss options to help you quit.

## 2019-01-18 LAB
ALBUMIN SERPL-MCNC: 4.4 G/DL (ref 3.6–5.1)
ALBUMIN/CREAT UR: 21 MCG/MG CREAT
ALBUMIN/GLOB SERPL: 1.4 (CALC) (ref 1–2.5)
ALP SERPL-CCNC: 60 U/L (ref 40–115)
ALT SERPL-CCNC: 25 U/L (ref 9–46)
AST SERPL-CCNC: 27 U/L (ref 10–35)
BILIRUB SERPL-MCNC: 0.3 MG/DL (ref 0.2–1.2)
BUN SERPL-MCNC: 17 MG/DL (ref 7–25)
BUN/CREAT SERPL: ABNORMAL (CALC) (ref 6–22)
CALCIUM SERPL-MCNC: 9.5 MG/DL (ref 8.6–10.3)
CHLORIDE SERPL-SCNC: 104 MMOL/L (ref 98–110)
CHOLEST SERPL-MCNC: 164 MG/DL
CHOLEST/HDLC SERPL: 5 (CALC)
CO2 SERPL-SCNC: 28 MMOL/L (ref 20–32)
CREAT SERPL-MCNC: 1.01 MG/DL (ref 0.7–1.25)
CREAT UR-MCNC: 103 MG/DL (ref 20–320)
GFR SERPL CREATININE-BSD FRML MDRD: 79 ML/MIN/1.73M2
GLOBULIN SER CALC-MCNC: 3.1 G/DL (CALC) (ref 1.9–3.7)
GLUCOSE SERPL-MCNC: 122 MG/DL (ref 65–99)
HBA1C MFR BLD: 6.2 % OF TOTAL HGB
HDLC SERPL-MCNC: 33 MG/DL
LDLC SERPL CALC-MCNC: 102 MG/DL (CALC)
MICROALBUMIN UR-MCNC: 2.2 MG/DL
NONHDLC SERPL-MCNC: 131 MG/DL (CALC)
POTASSIUM SERPL-SCNC: 4.1 MMOL/L (ref 3.5–5.3)
PROT SERPL-MCNC: 7.5 G/DL (ref 6.1–8.1)
SODIUM SERPL-SCNC: 140 MMOL/L (ref 135–146)
TRIGL SERPL-MCNC: 169 MG/DL
TSH SERPL-ACNC: 3.77 MIU/L (ref 0.4–4.5)

## 2019-01-19 ENCOUNTER — TELEPHONE (OUTPATIENT)
Dept: FAMILY MEDICINE | Facility: CLINIC | Age: 64
End: 2019-01-19

## 2019-01-19 NOTE — TELEPHONE ENCOUNTER
Spoke with wife, Suellen ([employer]), regarding lab results  -A1c excellent-diabetes is diet controlled.  Continue healthy lifestyle practices and repeat 6 months.  -Lipids with mild elevation-discussed elevated CVD risk and recommendation for statin for primary prevention of CVD.  She will convey to her .  If he declines statin, discussed coronary calcium score to help risk stratify.  -She will have Rhys call back with any questions or concerns  -Otherwise, we will discuss on the 31st when I see his wife  -She has no further questions regarding these results in the meantime.

## 2019-02-08 ENCOUNTER — TELEPHONE (OUTPATIENT)
Dept: FAMILY MEDICINE | Facility: CLINIC | Age: 64
End: 2019-02-08

## 2019-02-08 NOTE — TELEPHONE ENCOUNTER
S/w Rhys after wife reports she was concerned about his cough  -He reports 5 days of nasal congestion, rhinorrhea, postnasal drip, and cough only originating from mucus in his throat  -Denies cough from chest, wheezing, shortness of breath, fevers/chills, or any ear/sinus pain-call back immediately if any of these occur  -Otherwise, treat suspected viral URI with nasal saline 3 times daily, plain Mucinex, and hydration  -He agrees to plan and will monitor for any worsening

## 2019-02-14 NOTE — TELEPHONE ENCOUNTER
Patient walked in for an appointment  -Since schedule was filled, offered several appointment times tomorrow Friday and Saturday, all of which he declined  -Reports he is still coughing  -He agrees to go to urgent care this evening and to call for any worsening in the meantime  -He will see me in the next 2 weeks otherwise

## 2019-05-15 ENCOUNTER — OFFICE VISIT (OUTPATIENT)
Dept: FAMILY MEDICINE | Facility: CLINIC | Age: 64
End: 2019-05-15
Payer: COMMERCIAL

## 2019-05-15 VITALS
WEIGHT: 190 LBS | SYSTOLIC BLOOD PRESSURE: 140 MMHG | OXYGEN SATURATION: 98 % | HEART RATE: 78 BPM | BODY MASS INDEX: 27.2 KG/M2 | HEIGHT: 70 IN | RESPIRATION RATE: 16 BRPM | DIASTOLIC BLOOD PRESSURE: 70 MMHG

## 2019-05-15 DIAGNOSIS — E78.00 PURE HYPERCHOLESTEROLEMIA: ICD-10-CM

## 2019-05-15 DIAGNOSIS — Z72.0 TOBACCO ABUSE: ICD-10-CM

## 2019-05-15 DIAGNOSIS — E11.9 DIET-CONTROLLED DIABETES MELLITUS (CMS/HCC): Primary | ICD-10-CM

## 2019-05-15 DIAGNOSIS — Z91.89 CANDIDATE FOR STATIN THERAPY DUE TO RISK OF FUTURE CARDIOVASCULAR EVENT: ICD-10-CM

## 2019-05-15 DIAGNOSIS — I10 ESSENTIAL HYPERTENSION: ICD-10-CM

## 2019-05-15 DIAGNOSIS — E89.0 POSTABLATIVE HYPOTHYROIDISM: ICD-10-CM
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
## 2019-05-15 PROCEDURE — 99214 OFFICE O/P EST MOD 30 MIN: CPT | Performed by: FAMILY MEDICINE

## 2019-05-15 RX ORDER — DOXYCYCLINE 100 MG/1
100 CAPSULE ORAL 2 TIMES DAILY
Qty: 10 CAPSULE | Refills: 0 | Status: SHIPPED | OUTPATIENT
Start: 2019-05-15 | End: 2019-08-19

## 2019-05-15 RX ORDER — ESOMEPRAZOLE MAGNESIUM 40 MG/1
CAPSULE, DELAYED RELEASE ORAL
COMMUNITY
Start: 2019-05-14 | End: 2019-08-19

## 2019-05-15 ASSESSMENT — ENCOUNTER SYMPTOMS
ARTHRALGIAS: 0
HEADACHES: 0
SHORTNESS OF BREATH: 0
MYALGIAS: 0
WHEEZING: 0
FEVER: 0
PALPITATIONS: 0
DIZZINESS: 0
CHILLS: 0
CHEST TIGHTNESS: 0
LIGHT-HEADEDNESS: 0
VOMITING: 0
NAUSEA: 0

## 2019-05-15 NOTE — ASSESSMENT & PLAN NOTE
-Just at goal of <140/90  -He admits to whitecoat elevations in the past  -Reports he gets BP checked by RN at work and is consistently in the 120s over 80s  -I encouraged him to start ambulatory BP trending regimen at home and to call if several numbers >140/90  -Continue sodium <2000 mg/day

## 2019-05-15 NOTE — ASSESSMENT & PLAN NOTE
-CVD risk >30%  -Strongly encourage statin and discussed rationale for primary prevention  -His informed decision is to decline statin  -He does agree to proceed with coronary calcium score to help him assess his risk in the next month or so  -Based on result, we will revisit this conversation

## 2019-05-15 NOTE — ASSESSMENT & PLAN NOTE
-No evidence of acute Lyme at this time  -However, possible early cellulitis starting at site of tick bite, so prescribed doxycycline twice daily for 5 days  -Warning signs for worsening cellulitic infection given  -Check Lyme titer in 2 weeks and if positive would provide additional 10 days of doxycycline  -Protect skin from sun

## 2019-05-15 NOTE — PROGRESS NOTES
"Subjective      Patient ID: Ryhs Campos is a 63 y.o. male.  1955      Rhys is here for six-month BP follow-up    Hypertension  - taking med consistently     Hyperlipidemia  -, , , and HDL 33  -exercising 3-4 days/wk - treadmill x20 mins and weights x1 hr- no cp, sob, dizziness, lightheadedness. Energy actually improved    Diet-controlled diabetes  -Microalbumin WNL 1/2019  -A1c 6.2 at that time  -watching carbs and sweets    Tick Bite  - LUE  - removed with tweezers 2 days ago   - + itching  - denies fevers/chills, headaches, bleeding, discharge, arthralgias/myalgias         The following have been reviewed and updated as appropriate in this visit:  Tobacco  Allergies  Problems  Med Hx  Surg Hx  Fam Hx       Review of Systems   Constitutional: Negative for chills and fever.   Respiratory: Negative for chest tightness, shortness of breath and wheezing.    Cardiovascular: Negative for chest pain, palpitations and leg swelling.   Gastrointestinal: Negative for nausea and vomiting.   Musculoskeletal: Negative for arthralgias and myalgias.   Neurological: Negative for dizziness, syncope, light-headedness and headaches.       Objective     Vitals:    05/15/19 1752 05/15/19 1828   BP: 138/80 140/70   BP Location:  Left upper arm   Patient Position:  Sitting   Pulse: 78    Resp: 16    SpO2: 98%    Weight: 86.2 kg (190 lb)    Height: 1.778 m (5' 10\")      Body mass index is 27.26 kg/m².    Physical Exam   Constitutional: He is oriented to person, place, and time. He appears well-developed and well-nourished. No distress.   HENT:   Head: Normocephalic and atraumatic.   Cardiovascular: Normal rate, regular rhythm and normal heart sounds.  Exam reveals no gallop and no friction rub.    No murmur heard.  Pulmonary/Chest: Effort normal and breath sounds normal. No respiratory distress. He has no wheezes. He has no rales.   Abdominal: Soft. Bowel sounds are normal. He exhibits no distension and " no mass. There is no tenderness. There is no rebound and no guarding.   Musculoskeletal: He exhibits no edema.   Neurological: He is alert and oriented to person, place, and time.   Skin: He is not diaphoretic.   1 cm area of erythema and firmness, without fluctuance, surrounding scab over left mid anterior medial upper arm with no evident retained tick parts, active bleeding/discharge, or any tenderness to palpation.       Assessment/Plan   Diagnoses and all orders for this visit:    Diet-controlled diabetes mellitus (CMS/HCC) ([facility]) (Primary)  Assessment & Plan:  -Last A1c excellent at 6.2 4 months ago  -We will trend every 6 months  -Congratulated on continued healthy lifestyle practices, which he is consistent with-continue same  -UTD microalbumin 1/2019      Postablative hypothyroidism  Assessment & Plan:  -TSH WNL 1/2019-trend annually      Essential hypertension  Assessment & Plan:  -Just at goal of <140/90  -He admits to whitecoat elevations in the past  -Reports he gets BP checked by RN at work and is consistently in the 120s over 80s  -I encouraged him to start ambulatory BP trending regimen at home and to call if several numbers >140/90  -Continue sodium <2000 mg/day      Pure hypercholesterolemia  Assessment & Plan:  -Minimal elevation of LDL-see below  -Encouraged exercise, fish, and nuts to help improve HDL      Tobacco abuse  Assessment & Plan:  -Discussed link between smoking and CVD  -Declines smoking cessation options at this time and wants to reduce slowly on his own      Candidate for statin therapy due to risk of future cardiovascular event  Assessment & Plan:  -CVD risk >30%  -Strongly encourage statin and discussed rationale for primary prevention  -His informed decision is to decline statin  -He does agree to proceed with coronary calcium score to help him assess his risk in the next month or so  -Based on result, we will revisit this conversation    Orders:  -     CT HEART CORONARY CALCIUM  SCORE; Future    Tick bite, initial encounter  Assessment & Plan:  -No evidence of acute Lyme at this time  -However, possible early cellulitis starting at site of tick bite, so prescribed doxycycline twice daily for 5 days  -Warning signs for worsening cellulitic infection given  -Check Lyme titer in 2 weeks and if positive would provide additional 10 days of doxycycline  -Protect skin from sun    Orders:  -     Lyme EIA reflex WB; Future    Other orders  -     doxycycline hyclate (VIBRAMYCIN) 100 mg capsule; Take 1 capsule (100 mg total) by mouth 2 (two) times a day for 5 days.

## 2019-05-15 NOTE — PATIENT INSTRUCTIONS
Continue the great job of reducing carbohydrates, sugary beverages, sweets, candies.  Repeat blood work in 3 months here at preventative physical.  In the meantime, please have coronary calcium score done to rule out any blockages in the arteries of your heart.  If this shows any blockages, I would certainly recommend cholesterol medication, AKA statin.    Doxycycline 100 mg twice daily for 5 days only.  Have Lyme disease blood test done in 2 weeks at lab.  I will call you with results.  Call for any fevers/chills, spreading redness or warmth, increased pain or swelling, or if not resolved after antibiotic.     Check home blood pressures once per week only and call if ever >160/100 or if getting several numbers >140/90.

## 2019-05-15 NOTE — ASSESSMENT & PLAN NOTE
-Discussed link between smoking and CVD  -Declines smoking cessation options at this time and wants to reduce slowly on his own

## 2019-05-15 NOTE — ASSESSMENT & PLAN NOTE
-Last A1c excellent at 6.2 4 months ago  -We will trend every 6 months  -Congratulated on continued healthy lifestyle practices, which he is consistent with-continue same  -UTD microalbumin 1/2019

## 2019-05-22 ENCOUNTER — HOSPITAL ENCOUNTER (OUTPATIENT)
Facility: CLINIC | Age: 64
Discharge: LEFT BEFORE DISCHARGE | End: 2019-05-22
Payer: COMMERCIAL

## 2019-05-22 ENCOUNTER — HOSPITAL ENCOUNTER (OUTPATIENT)
Facility: CLINIC | Age: 64
Discharge: HOME | End: 2019-05-22
Attending: FAMILY MEDICINE
Payer: COMMERCIAL

## 2019-05-22 VITALS
HEART RATE: 76 BPM | SYSTOLIC BLOOD PRESSURE: 128 MMHG | HEIGHT: 70 IN | WEIGHT: 185 LBS | OXYGEN SATURATION: 98 % | RESPIRATION RATE: 15 BRPM | DIASTOLIC BLOOD PRESSURE: 80 MMHG | BODY MASS INDEX: 26.48 KG/M2

## 2019-05-22 DIAGNOSIS — J02.9 ACUTE PHARYNGITIS, UNSPECIFIED ETIOLOGY: Primary | ICD-10-CM

## 2019-05-22 DIAGNOSIS — H92.01 ACUTE OTALGIA, RIGHT: ICD-10-CM

## 2019-05-22 LAB — S PYO AG THROAT QL: NEGATIVE

## 2019-05-22 PROCEDURE — 87081 CULTURE SCREEN ONLY: CPT | Performed by: FAMILY MEDICINE

## 2019-05-22 PROCEDURE — 99213 OFFICE O/P EST LOW 20 MIN: CPT | Performed by: FAMILY MEDICINE

## 2019-05-22 PROCEDURE — 87880 STREP A ASSAY W/OPTIC: CPT | Performed by: FAMILY MEDICINE

## 2019-05-22 RX ORDER — AMOXICILLIN 500 MG/1
500 CAPSULE ORAL 2 TIMES DAILY
Qty: 20 CAPSULE | Refills: 0 | Status: SHIPPED | OUTPATIENT
Start: 2019-05-22 | End: 2019-08-19

## 2019-05-22 ASSESSMENT — ENCOUNTER SYMPTOMS
FACIAL SWELLING: 0
NECK STIFFNESS: 0
WEAKNESS: 0
CHILLS: 0
FEVER: 0
SINUS PAIN: 0
EYE PAIN: 0
CHOKING: 0
SHORTNESS OF BREATH: 0
DIZZINESS: 0
STRIDOR: 0
PHOTOPHOBIA: 0
NUMBNESS: 0

## 2019-05-23 NOTE — ED PROVIDER NOTES
History  Chief Complaint   Patient presents with   • Earache / Otalgia     day #6 of sore throat and right ear pain and right ear muffled hearing ('feels clogged').  no known exposures.   No cough.  Has had strep throat 4 times in past year.  Suspects strep throat.             Past Medical History:   Diagnosis Date   • BPH (benign prostatic hyperplasia)    • Graves disease    • Hypertension    • Male erectile dysfunction    • Type 2 diabetes mellitus (CMS/HCC) (HCC)        Past Surgical History:   Procedure Laterality Date   • EYE SURGERY Bilateral     from Graves       Family History   Problem Relation Age of Onset   • No Known Problems Mother    • Hypertension Father    • No Known Problems Sister        Social History   Substance Use Topics   • Smoking status: Current Every Day Smoker     Packs/day: 0.50   • Smokeless tobacco: Current User   • Alcohol use No       Review of Systems   Constitutional: Negative for chills and fever.   HENT: Positive for ear pain. Negative for ear discharge, facial swelling and sinus pain.    Eyes: Negative for photophobia, pain and visual disturbance.   Respiratory: Negative for choking, shortness of breath and stridor.    Musculoskeletal: Negative for neck stiffness.   Neurological: Negative for dizziness, weakness and numbness.        No lethargy       Physical Exam  ED Triage Vitals [05/22/19 1953]   Temp Heart Rate Resp BP SpO2   -- 76 15 128/80 98 %      Temp Source Heart Rate Source Patient Position BP Location FiO2 (%) (Set)   Oral Monitor Sitting Left upper arm --       Physical Exam   Constitutional: He is oriented to person, place, and time. He appears well-developed and well-nourished. No distress.   HENT:   Right Ear: Hearing, tympanic membrane, external ear and ear canal normal.   Left Ear: External ear and ear canal normal. Tympanic membrane is bulging. Tympanic membrane is not injected and not erythematous. A middle ear effusion (bubbles seen; fluid appears clear) is  present. Decreased hearing is noted.   Mouth/Throat: Uvula is midline and mucous membranes are normal. No oral lesions. No trismus in the jaw. Uvula swelling (2-3 times normal size, and mildly erythematous; baseline size unknown) present. Posterior oropharyngeal erythema present. No posterior oropharyngeal edema or tonsillar abscesses. Tonsils are 1+ on the right. Tonsils are 1+ on the left. No tonsillar exudate.   Eyes: Conjunctivae and lids are normal. No scleral icterus.   Neck: No neck rigidity.   Pulmonary/Chest: Effort normal.   Lymphadenopathy:     He has cervical adenopathy (anterior, right; none posterior).   Neurological: He is alert and oriented to person, place, and time. He is not disoriented.   Not lethargic; neck supple.   Skin: Skin is warm and dry. No rash noted. He is not diaphoretic.   Psychiatric: He has a normal mood and affect.         Procedures  Procedures    UC Course  Clinical Impressions as of May 22 2048   Acute pharyngitis, unspecified etiology   Acute otalgia, right       MDM  Number of Diagnoses or Management Options  Acute otalgia, right:   Acute pharyngitis, unspecified etiology:   Diagnosis management comments: Uvulitis, sore throat.   RS negative.  Sending throat culture.  Empiric antibiotic treatment reasonable.  Right ear pain due to effusion, not bacterial infection at this point.    If throat culture results negative, discontinue antibiotic.                  Thierry Marinelli MD  05/22/19 2053

## 2019-05-23 NOTE — DISCHARGE INSTRUCTIONS
Uvulitis, sore throat.   Rapid strep test negative.  Sending throat culture.  Empiric antibiotic treatment reasonable.  Right ear effusion, not bacterial infection at this point.    If throat culture results negative, discontinue antibiotic.

## 2019-05-25 LAB — B-HEM STREP SPEC QL CULT: NORMAL

## 2019-06-06 ENCOUNTER — APPOINTMENT (OUTPATIENT)
Dept: LAB | Age: 64
End: 2019-06-06
Attending: FAMILY MEDICINE
Payer: COMMERCIAL
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
## 2019-06-06 PROCEDURE — 36415 COLL VENOUS BLD VENIPUNCTURE: CPT

## 2019-06-06 PROCEDURE — 86618 LYME DISEASE ANTIBODY: CPT

## 2019-06-07 LAB — B BURGDOR AB SER IA-ACNC: 0.33 RATIO

## 2019-08-05 PROBLEM — R91.8 MULTIPLE LUNG NODULES ON CT: Status: ACTIVE | Noted: 2019-08-05

## 2019-08-05 PROBLEM — I25.10 CORONARY ARTERY CALCIFICATION SEEN ON CT SCAN: Status: ACTIVE | Noted: 2019-08-05

## 2019-08-19 ENCOUNTER — OFFICE VISIT (OUTPATIENT)
Dept: FAMILY MEDICINE | Facility: CLINIC | Age: 64
End: 2019-08-19
Payer: COMMERCIAL

## 2019-08-19 VITALS
RESPIRATION RATE: 14 BRPM | HEIGHT: 70 IN | BODY MASS INDEX: 26.88 KG/M2 | DIASTOLIC BLOOD PRESSURE: 80 MMHG | HEART RATE: 72 BPM | WEIGHT: 187.8 LBS | TEMPERATURE: 98.2 F | SYSTOLIC BLOOD PRESSURE: 132 MMHG

## 2019-08-19 DIAGNOSIS — E11.9 DIET-CONTROLLED DIABETES MELLITUS (CMS/HCC): ICD-10-CM

## 2019-08-19 DIAGNOSIS — R91.8 MULTIPLE LUNG NODULES ON CT: ICD-10-CM

## 2019-08-19 DIAGNOSIS — I25.10 CORONARY ARTERY CALCIFICATION SEEN ON CT SCAN: ICD-10-CM

## 2019-08-19 DIAGNOSIS — Z00.00 PREVENTATIVE HEALTH CARE: Primary | ICD-10-CM

## 2019-08-19 DIAGNOSIS — E89.0 POSTABLATIVE HYPOTHYROIDISM: ICD-10-CM

## 2019-08-19 DIAGNOSIS — K76.0 FATTY LIVER DISEASE, NONALCOHOLIC: ICD-10-CM

## 2019-08-19 DIAGNOSIS — Z72.0 TOBACCO ABUSE: ICD-10-CM

## 2019-08-19 DIAGNOSIS — E78.00 PURE HYPERCHOLESTEROLEMIA: ICD-10-CM

## 2019-08-19 DIAGNOSIS — N40.1 BENIGN PROSTATIC HYPERPLASIA WITH LOWER URINARY TRACT SYMPTOMS, SYMPTOM DETAILS UNSPECIFIED: ICD-10-CM

## 2019-08-19 DIAGNOSIS — I10 ESSENTIAL HYPERTENSION: ICD-10-CM

## 2019-08-19 DIAGNOSIS — K63.5 POLYP OF COLON, UNSPECIFIED PART OF COLON, UNSPECIFIED TYPE: ICD-10-CM
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
## 2019-08-19 PROCEDURE — 99396 PREV VISIT EST AGE 40-64: CPT | Performed by: FAMILY MEDICINE

## 2019-08-19 RX ORDER — ASPIRIN 81 MG/1
81 TABLET ORAL DAILY
COMMUNITY
End: 2020-01-07

## 2019-08-19 ASSESSMENT — ENCOUNTER SYMPTOMS
WEAKNESS: 0
DIARRHEA: 0
SHORTNESS OF BREATH: 0
ANAL BLEEDING: 0
APPETITE CHANGE: 0
DIZZINESS: 0
FATIGUE: 0
CHILLS: 0
UNEXPECTED WEIGHT CHANGE: 0
RECTAL PAIN: 0
WHEEZING: 0
VOMITING: 0
BLOOD IN STOOL: 0
HEADACHES: 0
NAUSEA: 0
NERVOUS/ANXIOUS: 0
FEVER: 0
CHEST TIGHTNESS: 0
ABDOMINAL PAIN: 0
DYSPHORIC MOOD: 0
PALPITATIONS: 0
CONSTIPATION: 0
NUMBNESS: 0
LIGHT-HEADEDNESS: 0

## 2019-08-19 NOTE — PROGRESS NOTES
Subjective      Patient ID: Rhys Campos is a 64 y.o. male.  1955      Complaints: none  Changes to Medical Hx:   - home Bps: 120's/80s. Never >140/90.      Diet: well balanced. No sugary beverages. Minimal bread.   Exercise: 4 days/week- weights, walking on incline x10 mins- no cp, sob, dizziness, wheezing.   T: 40 yrs x1 ppd  EtOH: denies  D: denies  FHx of Cancer: no family history of colon, breast, or prostate cancer  FHx of CVD: denies MI, CVA, stent or bypass surgery.   Lives with: wife  Work: maintenance   Colonoscopy: 10/2018-recommended 1 year follow-up.  Due in October  CT Lung: Had through hematologist-revealed multiple incidentally detected pulmonary nodules all <6 mm, but also coronary artery and AV calcifications     Prevnar/Pneumovax: had from last pcp  Zosta: Due for Shingrix- interested.   Td/TDap: 2015  Flu: Recommended in the fall     CMP/LP: Last  in 1/2019.  Due for repeat CMP today  HepC: neg 12/3/2018          The following have been reviewed and updated as appropriate in this visit:  Tobacco  Allergies  Meds  Med Hx  Surg Hx  Fam Hx       Review of Systems   Constitutional: Negative for appetite change, chills, fatigue, fever and unexpected weight change.   HENT: Negative for hearing loss and tinnitus.    Eyes: Negative for visual disturbance.   Respiratory: Negative for chest tightness, shortness of breath and wheezing.    Cardiovascular: Negative for chest pain, palpitations and leg swelling.   Gastrointestinal: Negative for abdominal pain, anal bleeding, blood in stool, constipation, diarrhea, nausea, rectal pain and vomiting.   Genitourinary:        Denies nocturia, slowed stream, incomplete emptying.    Skin: Negative for rash.   Neurological: Negative for dizziness, weakness, light-headedness, numbness and headaches.   Psychiatric/Behavioral: Negative for dysphoric mood. The patient is not nervous/anxious.        Objective     Vitals:    08/19/19 1430 08/19/19 1510  "  BP: 134/84 132/80   BP Location:  Left upper arm   Patient Position:  Sitting   Pulse: 72    Resp: 14    Temp: 36.8 °C (98.2 °F)    Weight: 85.2 kg (187 lb 12.8 oz)    Height: 1.778 m (5' 10\")      Body mass index is 26.95 kg/m².    Physical Exam   Constitutional: He is oriented to person, place, and time. He appears well-developed and well-nourished. No distress.   HENT:   Head: Normocephalic and atraumatic.   Right Ear: Hearing, tympanic membrane, external ear and ear canal normal.   Left Ear: Hearing, tympanic membrane, external ear and ear canal normal.   Mouth/Throat: Uvula is midline, oropharynx is clear and moist and mucous membranes are normal. Abnormal dentition (upper bridge with several missing/broken teeth). No oropharyngeal exudate.   Eyes: Pupils are equal, round, and reactive to light. EOM are normal. No scleral icterus.   Exophthalmos noted at baseline.   Neck: No thyromegaly present.   Cardiovascular: Normal rate, regular rhythm and normal heart sounds.  Exam reveals no gallop and no friction rub.    No murmur heard.  Pulmonary/Chest: Effort normal and breath sounds normal. No respiratory distress. He has no wheezes. He has no rales.   Abdominal: Soft. Bowel sounds are normal. He exhibits no distension and no mass. There is no tenderness. There is no rebound and no guarding. No hernia.   Musculoskeletal: He exhibits no edema.   Lymphadenopathy:     He has no cervical adenopathy.   Neurological: He is alert and oriented to person, place, and time. No cranial nerve deficit.   Skin: He is not diaphoretic.   Psychiatric: He has a normal mood and affect. His behavior is normal. Judgment and thought content normal. His mood appears not anxious.       Assessment/Plan   Diagnoses and all orders for this visit:    Preventative health care (Primary)  Comments:  Healthy lifestyle practices encouraged in the setting of comorbidities  Flu shot in the fall  Discussed risk/benefits of Shingrix-sent Rx to " pharmacy    Essential hypertension  Assessment & Plan:  -Remains within goal of <140/90  -Home numbers are even better  -Continue current medications and sodium <2000 mg/day    Orders:  -     CBC; Future    Pure hypercholesterolemia  -     Lipid panel; Future  -     Comprehensive metabolic panel; Future    Postablative hypothyroidism  Assessment & Plan:  -Clinically euthyroid with previously normal TSH 6 months ago-trend and labs  -Given persistent exophthalmos status post surgical repair, I reiterated importance of seeing ophthalmologist annually    Orders:  -     TSH 3rd Generation; Future    Diet-controlled diabetes mellitus (CMS/HCC) (HCC)  Assessment & Plan:  -Last A1c 6.2 in January, which is well controlled through diet  -Continue to trend off medication in the next week with fasting BG  -Up-to-date microalbumin    Orders:  -     Hemoglobin A1c; Future    Fatty liver disease, nonalcoholic    Tobacco abuse  Assessment & Plan:  -I reiterated the relationship between smoking and malignancy, MI, CVA, and other forms of vascular disease  -He expressed understanding, but continues to decline cessation options  -He will at least try to cut down      Coronary artery calcification seen on CT scan  Assessment & Plan:  -Incidentally noted  -He is very physically active and asx  -Recommended addition of 81 mg aspirin  -Also, in the setting of dyslipidemia, recommended initiation of statin therapy for primary prevention  -Discussed risk/benefits and he understands rationale, but is making informed decision not to proceed with statin  -He prefers coronary calcium score to determine severity of coronary plaque deposition and stress testing through cardiologist  -I referred him to Dr. Emory Ahuja  -He will let me know if he changes his mind about statin in the meantime    Orders:  -     Lipid panel; Future  -     Ambulatory referral to Cardiology; Future    Multiple lung nodules on CT    Polyp of colon, unspecified part of  colon, unspecified type  Assessment & Plan:  -Due for repeat colonoscopy in 2 months and he agrees to schedule with GI      Benign prostatic hyperplasia with lower urinary tract symptoms, symptom details unspecified  Assessment & Plan:  -Previous diagnosis  -Asx currently  -Trend PSA annually    Orders:  -     PSA; Future    Other orders  -     varicella-zoster [id number], PF, (SHINGRIX, PF,) 50 mcg/0.5 mL suspension for reconstitution injection; Inject 0.5 mL (50 mcg total) into the shoulder, thigh, or buttocks once for 1 dose.

## 2019-08-19 NOTE — ASSESSMENT & PLAN NOTE
-Remains within goal of <140/90  -Home numbers are even better  -Continue current medications and sodium <2000 mg/day

## 2019-08-19 NOTE — ASSESSMENT & PLAN NOTE
-Incidentally noted  -He is very physically active and asx  -Recommended addition of 81 mg aspirin  -Also, in the setting of dyslipidemia, recommended initiation of statin therapy for primary prevention  -Discussed risk/benefits and he understands rationale, but is making informed decision not to proceed with statin  -He prefers coronary calcium score to determine severity of coronary plaque deposition and stress testing through cardiologist  -I referred him to Dr. Emory Ahuja  -He will let me know if he changes his mind about statin in the meantime

## 2019-08-19 NOTE — PATIENT INSTRUCTIONS
Please continue obtaining at least 150 minutes of cardiovascular exercise per week.   Eat 5 servings of fruits and vegetables per day.    Continue to refrain from sugary beverages and cut back on carbs.  Dentist every 6 months.  Eye exam is overdue-please schedule with [facility].  Flu shot in the fall.  Please have new shingles vaccine, Shingrix, at the pharmacy.  Repeat colonoscopy in October.  Please have labs done fasting in the next week or 2 at [facility].  Prescription is provided.    Call [email or web address] ([phone]) for helpful tips and suggestions for smoking cessation.   Let me know when you are ready to discuss options to help you quit.     Please have coronary calcium score performed leading up to appointment with cardiologist, Dr. Ahuja, to discuss stress testing given the coronary calcifications on your recent CT scan.  Start Baby aspirin.  Let me know if you change your mind about statin in the meantime.  
done

## 2019-08-19 NOTE — ASSESSMENT & PLAN NOTE
-I reiterated the relationship between smoking and malignancy, MI, CVA, and other forms of vascular disease  -He expressed understanding, but continues to decline cessation options  -He will at least try to cut down

## 2019-08-19 NOTE — ASSESSMENT & PLAN NOTE
-Last A1c 6.2 in January, which is well controlled through diet  -Continue to trend off medication in the next week with fasting BG  -Up-to-date microalbumin

## 2019-08-19 NOTE — ASSESSMENT & PLAN NOTE
-Clinically euthyroid with previously normal TSH 6 months ago-trend and labs  -Given persistent exophthalmos status post surgical repair, I reiterated importance of seeing ophthalmologist annually

## 2019-08-26 ENCOUNTER — APPOINTMENT (OUTPATIENT)
Dept: LAB | Age: 64
End: 2019-08-26
Attending: FAMILY MEDICINE
Payer: COMMERCIAL

## 2019-08-26 DIAGNOSIS — I25.10 CORONARY ARTERY CALCIFICATION SEEN ON CT SCAN: ICD-10-CM

## 2019-08-26 DIAGNOSIS — E89.0 POSTABLATIVE HYPOTHYROIDISM: ICD-10-CM

## 2019-08-26 DIAGNOSIS — I10 ESSENTIAL HYPERTENSION: ICD-10-CM

## 2019-08-26 DIAGNOSIS — E78.00 PURE HYPERCHOLESTEROLEMIA: ICD-10-CM

## 2019-08-26 DIAGNOSIS — N40.1 BENIGN PROSTATIC HYPERPLASIA WITH LOWER URINARY TRACT SYMPTOMS, SYMPTOM DETAILS UNSPECIFIED: ICD-10-CM

## 2019-08-26 DIAGNOSIS — E11.9 DIET-CONTROLLED DIABETES MELLITUS (CMS/HCC): ICD-10-CM

## 2019-08-26 LAB
ALBUMIN SERPL-MCNC: 4.1 G/DL (ref 3.4–5)
ALP SERPL-CCNC: 53 IU/L (ref 35–126)
ALT SERPL-CCNC: 22 IU/L (ref 16–63)
ANION GAP SERPL CALC-SCNC: 7 MEQ/L (ref 3–15)
AST SERPL-CCNC: 25 IU/L (ref 15–41)
BILIRUB SERPL-MCNC: 0.9 MG/DL (ref 0.3–1.2)
BUN SERPL-MCNC: 14 MG/DL (ref 8–20)
CALCIUM SERPL-MCNC: 9.3 MG/DL (ref 8.9–10.3)
CHLORIDE SERPL-SCNC: 104 MEQ/L (ref 98–109)
CHOLEST SERPL-MCNC: 141 MG/DL
CO2 SERPL-SCNC: 30 MEQ/L (ref 22–32)
CREAT SERPL-MCNC: 1.1 MG/DL
ERYTHROCYTE [DISTWIDTH] IN BLOOD BY AUTOMATED COUNT: 12.9 % (ref 11.6–14.4)
EST. AVERAGE GLUCOSE BLD GHB EST-MCNC: 137 MG/DL
GFR SERPL CREATININE-BSD FRML MDRD: >60 ML/MIN/1.73M*2
GLUCOSE SERPL-MCNC: 105 MG/DL (ref 70–99)
HBA1C MFR BLD HPLC: 6.4 %
HCT VFR BLDCO AUTO: 44.7 %
HDLC SERPL-MCNC: 28 MG/DL
HDLC SERPL: 5 {RATIO}
HGB BLD-MCNC: 14.6 G/DL
LDLC SERPL CALC-MCNC: 92 MG/DL
MCH RBC QN AUTO: 30.7 PG (ref 28–33.2)
MCHC RBC AUTO-ENTMCNC: 32.7 G/DL (ref 32.2–36.5)
MCV RBC AUTO: 93.9 FL (ref 83–98)
NONHDLC SERPL-MCNC: 113 MG/DL
PDW BLD AUTO: 14 FL (ref 9.4–12.4)
PLATELET # BLD AUTO: 141 K/UL
POTASSIUM SERPL-SCNC: 4.2 MEQ/L (ref 3.6–5.1)
PROT SERPL-MCNC: 7.1 G/DL (ref 6–8.2)
PSA SERPL-MCNC: 2.92 NG/ML
RBC # BLD AUTO: 4.76 M/UL (ref 4.5–5.8)
SODIUM SERPL-SCNC: 141 MEQ/L (ref 136–144)
TRIGL SERPL-MCNC: 104 MG/DL (ref 30–149)
TSH SERPL DL<=0.05 MIU/L-ACNC: 4.84 MIU/L (ref 0.34–5.6)
WBC # BLD AUTO: 6.67 K/UL

## 2019-08-26 PROCEDURE — 36415 COLL VENOUS BLD VENIPUNCTURE: CPT

## 2019-08-26 PROCEDURE — 83036 HEMOGLOBIN GLYCOSYLATED A1C: CPT

## 2019-08-26 PROCEDURE — 84443 ASSAY THYROID STIM HORMONE: CPT

## 2019-08-26 PROCEDURE — 80053 COMPREHEN METABOLIC PANEL: CPT

## 2019-08-26 PROCEDURE — 80061 LIPID PANEL: CPT

## 2019-08-26 PROCEDURE — 84153 ASSAY OF PSA TOTAL: CPT

## 2019-08-26 PROCEDURE — 85027 COMPLETE CBC AUTOMATED: CPT

## 2019-08-27 DIAGNOSIS — D69.6 THROMBOCYTOPENIA (CMS/HCC): Primary | ICD-10-CM

## 2019-08-28 ASSESSMENT — ENCOUNTER SYMPTOMS
PALPITATIONS: 0
DIZZINESS: 0
STIFFNESS: 0
SYNCOPE: 0
BRUISES/BLEEDS EASILY: 0
DYSPNEA ON EXERTION: 0
NEAR-SYNCOPE: 0
ALTERED MENTAL STATUS: 0
LIGHT-HEADEDNESS: 0
ORTHOPNEA: 0
WEAKNESS: 0
IRREGULAR HEARTBEAT: 0
BACK PAIN: 0
COUGH: 0
CONSTIPATION: 0
HEARTBURN: 0
FREQUENCY: 0

## 2019-08-28 NOTE — PROGRESS NOTES
Cardiology  Office Consult Note         Reason for visit:   Chief Complaint   Patient presents with   • Cardiac Evaluation       HPI     Rhys Campos is a 64 y.o. male who presents to the office for cardiovascular evaluation.    He has a history of hypertension and dyslipidemia.  He has been on Norvasc 10mg daily and Lisinopril HCT 20/12.5mg daily since 2004.  His home BPs on this regimen have been 115-130s/75-85.    His FLP on 1/17/19 showed:  , HDL 33,  and .  Following this his PCP suggested starting a statin; however, he preferred to try dieting and exercise.  At that point he modified his diet and began exercising at the [facility] [place] at least 3 days per week for approximately 1 hour.    His repeat FLP on 8/26/19 showed:  , HDL 28, LDL 92 and .    He had a recent chest CT on 8/5/19 which showed coronary artery calcifications and calcification of aortic valve.  Per his PCP, he is now scheduled to have a coronary calcium score at [facility] on 9/7/19.    He denies any chest pain, edema, palpitations, near syncope or syncope.  He does admit to mild exertional dyspnea which has not impacted his lifestyle in a significant way.    He has smoked 1/2 to 1 pack of cigarettes per day for 40 years.      Outside records reviewed..    Past Medical History:   Diagnosis Date   • BPH (benign prostatic hyperplasia)    • Graves disease    • Hypertension    • Lipid disorder    • Male erectile dysfunction    • Type 2 diabetes mellitus (CMS/HCC) (HCC)        Past Surgical History:   Procedure Laterality Date   • COLONOSCOPY     • EYE SURGERY Bilateral     from Graves   • SINUS SURGERY          History   Smoking Status   • Current Every Day Smoker   • Packs/day: 0.75   • Years: 40.00     Social History   Substance Use Topics   • Smoking status: Current Every Day Smoker     Packs/day: 0.75     Years: 40.00   • Smokeless tobacco: Current User   • Alcohol use No       Family History    Problem Relation Age of Onset   • Other Mother         natural causes -  at age 75   • Hypertension Father    • Other Father         natural causes -  at age 78   • No Known Problems Sister        Allergies:  No known allergies    Current Outpatient Prescriptions   Medication Sig Dispense Refill   • amLODIPine (NORVASC) 10 mg tablet take 1 tablet by mouth once daily 90 tablet 0   • levothyroxine (SYNTHROID) 125 mcg tablet take 1 tablet by mouth once daily 90 tablet 0   • lisinopril-hydrochlorothiazide (PRINZIDE,ZESTORETIC) 20-12.5 mg per tablet Take 1 tablet by mouth once daily. 90 tablet 3   • aspirin 81 mg enteric coated tablet Take 81 mg by mouth daily.       No current facility-administered medications for this visit.        Review of Systems   Constitution: Negative for weakness and malaise/fatigue.   HENT: Negative for congestion.    Eyes: Negative for visual disturbance.   Cardiovascular: Negative for chest pain, dyspnea on exertion, irregular heartbeat, leg swelling, near-syncope, orthopnea, palpitations and syncope.   Respiratory: Negative for cough.    Hematologic/Lymphatic: Does not bruise/bleed easily.   Skin: Negative for rash.   Musculoskeletal: Negative for back pain, joint pain and stiffness.   Gastrointestinal: Negative for constipation and heartburn.   Genitourinary: Negative for frequency, nocturia and urgency.   Neurological: Negative for dizziness and light-headedness.   Psychiatric/Behavioral: Negative for altered mental status.       Objective     Vitals:    19 1306   BP: 130/72   Pulse: 66   Resp: 16   SpO2: 97%       Wt Readings from Last 1 Encounters:   19 84.8 kg (187 lb)       Body mass index is 27.62 kg/m².    Physical Exam   Constitutional: He is oriented to person, place, and time. He appears well-developed and well-nourished. No distress.   HENT:   Head: Normocephalic.   Eyes: Conjunctivae are normal.   Neck: Normal range of motion.   Cardiovascular: Normal rate,  regular rhythm, normal heart sounds and intact distal pulses.    Pulmonary/Chest: Effort normal and breath sounds normal. No respiratory distress. He has no wheezes. He has no rales.   Abdominal: Soft. Bowel sounds are normal. He exhibits no distension.   Musculoskeletal: Normal range of motion. He exhibits no edema.   Neurological: He is alert and oriented to person, place, and time.   Skin: Skin is warm and dry. No rash noted.   Psychiatric: He has a normal mood and affect. His behavior is normal. Judgment and thought content normal.      ECG: Normal sinus rhythm, within normal limits    Hematology  Lab Results   Component Value Date    WBC 6.67 08/26/2019    HGB 14.6 08/26/2019    HCT 44.7 08/26/2019     (L) 08/26/2019       Chemistries  Lab Results   Component Value Date     08/26/2019    K 4.2 08/26/2019     08/26/2019    CREATININE 1.1 08/26/2019    BUN 14 08/26/2019    CO2 30 08/26/2019    GLUCOSE 105 (H) 08/26/2019    CALCIUM 9.3 08/26/2019    ALT 22 08/26/2019    AST 25 08/26/2019       Cholesterol  Lab Results   Component Value Date    CHOL 141 08/26/2019    TRIG 104 08/26/2019    HDL 28 (L) 08/26/2019    LDLCALC 92 08/26/2019    NONHDLCALC 113 08/26/2019       Endocrine  Lab Results   Component Value Date    TSH 4.84 08/26/2019    FREET4 1.04 08/10/2018    HGBA1C 6.4 (H) 08/26/2019       Assessment/Plan     Coronary artery calcification seen on CT scan  I have asked the patient to have a stress echocardiographic examination performed in order to ensure that he has no evidence of severe coronary disease.  He will also be having a coronary calcium score performed.  We will discuss the results of his scan with him at the time of his stress test to make a decision regarding future evaluation and therapy at that time.    Diet-controlled diabetes mellitus (CMS/HCC) (HCC)  The patient's hemoglobin A1c's have been mildly elevated.  This, along with his demonstration of coronary calcium  deposition, and his smoking history, place him at significant risk for vascular disease.  With that in mind, he will be undergoing a stress testing in the near future.  He will continue to follow-up with his primary physician with regard to management of this diagnosis.    Essential hypertension  The patient's blood pressure is well controlled on the current medical regimen.  There are no apparent side effects from medications.  We will continue the current therapy without change.    Tobacco abuse  I have urged the patient to discontinue smoking as soon as possible.  He is attempting to cut back on his smoking although he does not appear to be dedicated at this point.  We will continue our discussions in this regard during his upcoming visits.    No orders of the defined types were placed in this encounter.      There are no discontinued medications.    Orders Placed This Encounter   Procedures   • ECG 12 LEAD-OFFICE PERFORMED     Scheduling Instructions:      PLEASE USE THIS ORDER FOR ECG'S PERFORMED IN PHYSICIAN OFFICES            Alisa SOTO, am scribing for, and in the presence of, Doc Fox MD.    Doc SOTO MD, personally performed the services described in this documentation as scribed by Alisa Vaz in my presence, and it is both accurate and complete.     Doc Fox MD  9/4/2019

## 2019-09-04 ENCOUNTER — OFFICE VISIT (OUTPATIENT)
Dept: CARDIOLOGY | Facility: CLINIC | Age: 64
End: 2019-09-04
Payer: COMMERCIAL

## 2019-09-04 ENCOUNTER — TELEPHONE (OUTPATIENT)
Dept: CARDIOLOGY | Facility: CLINIC | Age: 64
End: 2019-09-04

## 2019-09-04 VITALS
OXYGEN SATURATION: 97 % | HEART RATE: 66 BPM | BODY MASS INDEX: 27.7 KG/M2 | HEIGHT: 69 IN | DIASTOLIC BLOOD PRESSURE: 72 MMHG | SYSTOLIC BLOOD PRESSURE: 130 MMHG | RESPIRATION RATE: 16 BRPM | WEIGHT: 187 LBS

## 2019-09-04 DIAGNOSIS — E11.9 DIET-CONTROLLED DIABETES MELLITUS (CMS/HCC): ICD-10-CM

## 2019-09-04 DIAGNOSIS — I10 ESSENTIAL HYPERTENSION: ICD-10-CM

## 2019-09-04 DIAGNOSIS — I25.10 CORONARY ARTERY CALCIFICATION SEEN ON CT SCAN: Primary | ICD-10-CM

## 2019-09-04 DIAGNOSIS — Z72.0 TOBACCO ABUSE: ICD-10-CM

## 2019-09-04 PROCEDURE — 93000 ELECTROCARDIOGRAM COMPLETE: CPT | Performed by: INTERNAL MEDICINE

## 2019-09-04 PROCEDURE — 99204 OFFICE O/P NEW MOD 45 MIN: CPT | Performed by: INTERNAL MEDICINE

## 2019-09-04 ASSESSMENT — PAIN SCALES - GENERAL: PAINLEVEL: 0-NO PAIN

## 2019-09-04 NOTE — ASSESSMENT & PLAN NOTE
The patient's hemoglobin A1c's have been mildly elevated.  This, along with his demonstration of coronary calcium deposition, and his smoking history, place him at significant risk for vascular disease.  With that in mind, he will be undergoing a stress testing in the near future.  He will continue to follow-up with his primary physician with regard to management of this diagnosis.

## 2019-09-04 NOTE — ASSESSMENT & PLAN NOTE
The patient's blood pressure is well controlled on the current medical regimen.  There are no apparent side effects from medications.  We will continue the current therapy without change.

## 2019-09-04 NOTE — ASSESSMENT & PLAN NOTE
I have asked the patient to have a stress echocardiographic examination performed in order to ensure that he has no evidence of severe coronary disease.  He will also be having a coronary calcium score performed.  We will discuss the results of his scan with him at the time of his stress test to make a decision regarding future evaluation and therapy at that time.

## 2019-09-04 NOTE — TELEPHONE ENCOUNTER
Pt coming to [facility] for stress echo on 9/17.  Needs pre cert.     NPI  [id number]  Cpt [id number]  thanks

## 2019-09-04 NOTE — ASSESSMENT & PLAN NOTE
I have urged the patient to discontinue smoking as soon as possible.  He is attempting to cut back on his smoking although he does not appear to be dedicated at this point.  We will continue our discussions in this regard during his upcoming visits.

## 2019-09-07 ENCOUNTER — HOSPITAL ENCOUNTER (OUTPATIENT)
Dept: RADIOLOGY | Facility: HOSPITAL | Age: 64
Discharge: HOME | End: 2019-09-07
Attending: FAMILY MEDICINE

## 2019-09-07 DIAGNOSIS — Z91.89 CANDIDATE FOR STATIN THERAPY DUE TO RISK OF FUTURE CARDIOVASCULAR EVENT: ICD-10-CM

## 2019-09-07 PROCEDURE — 75571 CT HRT W/O DYE W/CA TEST: CPT

## 2019-09-17 ENCOUNTER — HOSPITAL ENCOUNTER (OUTPATIENT)
Dept: CARDIOLOGY | Facility: CLINIC | Age: 64
Discharge: HOME | End: 2019-09-17
Payer: COMMERCIAL

## 2019-09-17 VITALS
SYSTOLIC BLOOD PRESSURE: 130 MMHG | BODY MASS INDEX: 27.99 KG/M2 | DIASTOLIC BLOOD PRESSURE: 72 MMHG | HEIGHT: 69 IN | WEIGHT: 189 LBS

## 2019-09-17 DIAGNOSIS — I10 ESSENTIAL HYPERTENSION: ICD-10-CM

## 2019-09-17 DIAGNOSIS — I25.10 CORONARY ARTERY CALCIFICATION SEEN ON CT SCAN: ICD-10-CM

## 2019-09-17 LAB
AORTIC ROOT ANNULUS: 3.8 CM
ASCENDING AORTA: 4.4 CM
AV PEAK GRADIENT: 12 MMHG
AV PEAK VELOCITY-S: 1.7 M/S
AV VALVE AREA: 2.34 CM2
BSA FOR ECHO PROCEDURE: 2.02 M2
E WAVE DECELERATION TIME: 271 MS
E/A RATIO: 0.7
E/E' RATIO: 12
E/LAT E' RATIO: 8.1
EDV (BP): 90.4 CM3
EF (A4C): 65.2 %
EF A2C: 64.3 %
EJECTION FRACTION: 64.8 %
ESV (BP): 31.8 CM3
LA ESV (BP): 43.2 CM3
LA ESV INDEX (A2C): 22.28 CM3/M2
LA ESV INDEX (BP): 21.39 CM3/M2
LAAS-AP2: 17.1 CM2
LAAS-AP4: 15.2 CM2
LALD A4C: 4.75 CM
LALD A4C: 5.23 CM
LAV-S: 45 CM3
LEFT ATRIUM VOLUME INDEX: 21.39 CM3/M2
LEFT ATRIUM VOLUME: 43.2 CM3
LEFT VENTRICLE DIASTOLIC VOLUME INDEX: 42.43 CM3/M2
LEFT VENTRICLE DIASTOLIC VOLUME: 85.7 CM3
LEFT VENTRICLE SYSTOLIC VOLUME INDEX: 14.75 CM3/M2
LEFT VENTRICLE SYSTOLIC VOLUME: 29.8 CM3
LV DIASTOLIC VOLUME: 90.1 CM3
LV ESV (APICAL 2 CHAMBER): 32 CM3
LVAD-AP2: 29.4 CM2
LVAD-AP4: 29.4 CM2
LVAS-AP2: 15.2 CM2
LVAS-AP4: 15.2 CM2
LVEDVI(A2C): 44.6 CM3/M2
LVEDVI(BP): 44.75 CM3/M2
LVESVI(A2C): 15.84 CM3/M2
LVESVI(BP): 15.74 CM3/M2
LVLD-AP2: 7.91 CM
LVLD-AP4: 8.38 CM
LVLS-AP2: 6.11 CM
LVLS-AP4: 6.65 CM
LVOT 2D: 2.1 CM
LVOT A: 3.46 CM2
LVOT PEAK VELOCITY: 1.15 M/S
MV E'TISSUE VEL-LAT: 0.1 M/S
MV E'TISSUE VEL-MED: 0.07 M/S
MV PEAK A VEL: 1.12 M/S
MV PEAK E VEL: 0.84 M/S
PV PEAK GRADIENT: 7 MMHG
PV PV: 1.28 M/S
RVOT VMAX: 0.74 M/S
STRESS ANGINA INDEX: 0
STRESS BASELINE BP: NORMAL MMHG
STRESS BASELINE HR: 66 BPM
STRESS PERCENT HR: 97 %
STRESS POST ESTIMATED WORKLOAD: 13.3 METS
STRESS POST EXERCISE DUR MIN: 10 MIN
STRESS POST EXERCISE DUR SEC: 0 SEC
STRESS POST PEAK BP: NORMAL MMHG
STRESS POST PEAK HR: 151 BPM
STRESS TARGET HR: 133 BPM
TR MAX PG: 28 MMHG
TRICUSPID VALVE PEAK REGURGITATION VELOCITY: 2.63 M/S
TV REST PULMONARY ARTERY PRESSURE: 33 MMHG

## 2019-09-17 PROCEDURE — 93351 STRESS TTE COMPLETE: CPT | Performed by: INTERNAL MEDICINE

## 2019-09-23 ENCOUNTER — TELEPHONE (OUTPATIENT)
Dept: CARDIOLOGY | Facility: CLINIC | Age: 64
End: 2019-09-23

## 2019-09-23 NOTE — TELEPHONE ENCOUNTER
I called the patient and left a message on his answering machine.  His stress test was normal.  He will call back to further discuss the results.

## 2019-09-23 NOTE — TELEPHONE ENCOUNTER
Dr. Doc Fox pt.    Mrs. Campos is calling for results of pts stress test done on September 17, 2019 in [facility].    Phone [phone]

## 2019-11-04 RX ORDER — LEVOTHYROXINE SODIUM 125 UG/1
125 TABLET ORAL
Qty: 90 TABLET | Refills: 0 | Status: SHIPPED | OUTPATIENT
Start: 2019-11-04 | End: 2020-01-28

## 2019-11-04 RX ORDER — AMLODIPINE BESYLATE 10 MG/1
10 TABLET ORAL
Qty: 90 TABLET | Refills: 1 | Status: SHIPPED | OUTPATIENT
Start: 2019-11-04 | End: 2020-04-23

## 2019-11-04 NOTE — TELEPHONE ENCOUNTER
Patients wife called in for refills on her husbands levothyroxine and amlodipine, [facility]  Medicine Refill Request    Last Office Visit: 8/19/2019  Next Office Visit: 11/27/2019        Current Outpatient Medications:   •  amLODIPine (NORVASC) 10 mg tablet, take 1 tablet by mouth once daily, Disp: 90 tablet, Rfl: 0  •  aspirin 81 mg enteric coated tablet, Take 81 mg by mouth daily., Disp: , Rfl:   •  levothyroxine (SYNTHROID) 125 mcg tablet, take 1 tablet by mouth once daily, Disp: 90 tablet, Rfl: 0  •  lisinopril-hydrochlorothiazide (PRINZIDE,ZESTORETIC) 20-12.5 mg per tablet, Take 1 tablet by mouth once daily., Disp: 90 tablet, Rfl: 3      BP Readings from Last 3 Encounters:   09/17/19 130/72   09/04/19 130/72   08/19/19 132/80       Recent Lab results:  Lab Results   Component Value Date    CHOL 141 08/26/2019   ,   Lab Results   Component Value Date    HDL 28 (L) 08/26/2019   ,   Lab Results   Component Value Date    LDLCALC 92 08/26/2019   ,   Lab Results   Component Value Date    TRIG 104 08/26/2019        Lab Results   Component Value Date    GLUCOSE 105 (H) 08/26/2019   ,   Lab Results   Component Value Date    HGBA1C 6.4 (H) 08/26/2019         Lab Results   Component Value Date    CREATININE 1.1 08/26/2019       Lab Results   Component Value Date    TSH 4.84 08/26/2019

## 2019-12-04 ENCOUNTER — CLINICAL SUPPORT (OUTPATIENT)
Dept: FAMILY MEDICINE | Facility: CLINIC | Age: 64
End: 2019-12-04
Payer: COMMERCIAL

## 2019-12-04 ENCOUNTER — TELEPHONE (OUTPATIENT)
Dept: FAMILY MEDICINE | Facility: CLINIC | Age: 64
End: 2019-12-04

## 2019-12-04 DIAGNOSIS — Z23 NEED FOR IMMUNIZATION AGAINST INFLUENZA: ICD-10-CM

## 2019-12-04 DIAGNOSIS — Z23 NEED FOR IMMUNIZATION AGAINST INFLUENZA: Primary | ICD-10-CM

## 2019-12-04 PROCEDURE — 90686 IIV4 VACC NO PRSV 0.5 ML IM: CPT | Performed by: FAMILY MEDICINE

## 2019-12-04 PROCEDURE — 90471 IMMUNIZATION ADMIN: CPT | Performed by: FAMILY MEDICINE

## 2019-12-04 NOTE — PATIENT INSTRUCTIONS
VACCINE INFORMATION STATEMENT     Influenza (Flu) Vaccine (Inactivated or Recombinant): What you need to know     Many Vaccine Information Statements are available in Wolof and other languages. See  www.immunize.org/vis    Hojas de información sobre vacunas están disponibles en español y en muchos otrosidiomas. Visite www.immunize.org/vis     1. Why Get Vaccinated?  Influenza vaccine can prevent influenza (flu).  Flu is a contagious disease that spreads around the [place] every year, usually between October and May. Anyone can get the flu, but it is more dangerous for some people. Infants and young children, people 65 years of age and older, pregnant women, and people with certain health conditions or a weakened immune system are at greatest risk of flu complications.  Pneumonia, bronchitis, sinus infections and ear infections are examples of flu-related complications. If you have a medical condition, such as heart disease, cancer or diabetes, flu can make it worse.  Flu can cause fever and chills, sore throat, muscle aches, fatigue, cough, headache, and runny or stuffy nose. Some people may have vomiting and diarrhea, though this is more common in children than adults.  Each year thousands of people in the [place] die from flu, and many more are hospitalized. Flu vaccine prevents millions of illnesses and flu-related visits to the doctor each year.    2. Influenza vaccine  CDC recommends everyone 6 months of age and older get vaccinated every flu season. Children  6 months through 8 years of age may need 2 doses during a single flu season. Everyone else needs only 1 dose each flu season.  It takes about 2 weeks for protection to develop after vaccination.  There are many flu viruses, and they are always changing. Each year a new flu vaccine is made to protect against three or four viruses that are likely to cause disease in the upcoming flu season. Even when the vaccine doesn't exactly match  these viruses, it may still provide some protection.  Influenza vaccine does not cause flu.  Influenza vaccine may be given at the same time as other vaccines.    3. Talk with your health care provider   Tell your vaccine provider if the person getting the vaccine:  Has had an allergic reaction after a previous dose of influenza vaccine, or has any severe, life- threatening allergies.  Has ever had Guillain-Barré Syndrome (also called GBS).  In some cases, your health care provider may decide to postpone influenza vaccination to a future visit.  People with minor illnesses, such as a cold, may be vaccinated. People who are moderately or severely ill should usually wait until they recover before getting influenza vaccine.  Your health care provider can give you more information.    4. Risks of vaccine reaction  Soreness, redness, and swelling where shot is given, fever, muscle aches, and headache can happen after influenza vaccine.  There may be a very small increased risk of Guillain-Barré Syndrome (GBS) after inactivated influenza vaccine (the flu shot).      Young children who get the flu shot along with pneumococcal vaccine (PCV13), and/or DTaP vaccine at the same time might be slightly more likely to have a seizure caused by fever. Tell your health care provider if a child who is getting flu vaccine has ever had a seizure.  People sometimes faint after medical procedures, including vaccination. Tell your provider if you feel dizzy or have vision changes or ringing in the ears.  As with any medicine, there is a very remote chance of a vaccine causing a severe allergic reaction, other serious injury, or death.     5. What if there is a serious problem?  An allergic reaction could occur after the vaccinated person leaves the clinic. If you see signs of a  severe allergic reaction (hives, swelling of the face and throat, difficulty breathing, a fast heartbeat, dizziness, or weakness), call 9-1-1 and get the person  to the nearest hospital.  For other signs that concern you, call your health care provider.  Adverse reactions should be reported to the Vaccine Adverse Event Reporting System (VAERS). Your health care provider will usually file this report, or you can do it yourself. Visit the VAERS website at www.vaers.[facility].gov or call 1-589.284.3164. VAERS is only for reporting reactions, and VAERS staff do not give medical advice.    6. The National Vaccine Injury Compensation Program  The National Vaccine Injury Compensation Program (VICP) is a federal program that was created to compensate people who may have been injured by certain vaccines. Visit the VICP website at www.[facility].gov/vaccinecompensation or call 1-558.906.7945 to learn about the program and about filing a claim. There is a time limit to file a claim for compensation.    7. How can I learn more?  Ask your healthcare provider.  Call your local or state health department.  Contact the Centers for Disease Control and Prevention (CDC):  - Call 1-330.540.1599 (8-939-TDH-INFO) or  - Visit CDC's www.cdc.gov/flu       U.S. Department of  Health and Human Services  Centers for Disease Control and Prevention  Vaccine Information Statement (Interim)  Inactivated Influenza Vaccine  8/15/2019  42 U.S.C. § 300aa-26

## 2020-01-07 ENCOUNTER — TELEPHONE (OUTPATIENT)
Dept: FAMILY MEDICINE | Facility: CLINIC | Age: 65
End: 2020-01-07

## 2020-01-07 ENCOUNTER — OFFICE VISIT (OUTPATIENT)
Dept: FAMILY MEDICINE | Facility: CLINIC | Age: 65
End: 2020-01-07
Payer: COMMERCIAL

## 2020-01-07 VITALS
BODY MASS INDEX: 27.99 KG/M2 | OXYGEN SATURATION: 99 % | DIASTOLIC BLOOD PRESSURE: 80 MMHG | WEIGHT: 189 LBS | RESPIRATION RATE: 12 BRPM | HEART RATE: 69 BPM | SYSTOLIC BLOOD PRESSURE: 132 MMHG | HEIGHT: 69 IN

## 2020-01-07 DIAGNOSIS — R91.8 MULTIPLE LUNG NODULES ON CT: ICD-10-CM

## 2020-01-07 DIAGNOSIS — I25.10 CORONARY ARTERY CALCIFICATION SEEN ON CT SCAN: ICD-10-CM

## 2020-01-07 DIAGNOSIS — E11.9 DIET-CONTROLLED DIABETES MELLITUS (CMS/HCC): Primary | ICD-10-CM

## 2020-01-07 DIAGNOSIS — E89.0 POSTABLATIVE HYPOTHYROIDISM: ICD-10-CM

## 2020-01-07 DIAGNOSIS — K63.5 POLYP OF COLON, UNSPECIFIED PART OF COLON, UNSPECIFIED TYPE: ICD-10-CM

## 2020-01-07 DIAGNOSIS — I10 ESSENTIAL HYPERTENSION: ICD-10-CM

## 2020-01-07 PROBLEM — Z91.89 CANDIDATE FOR STATIN THERAPY DUE TO RISK OF FUTURE CARDIOVASCULAR EVENT: Status: RESOLVED | Noted: 2019-05-15 | Resolved: 2020-01-07

## 2020-01-07 PROCEDURE — 99214 OFFICE O/P EST MOD 30 MIN: CPT | Performed by: FAMILY MEDICINE

## 2020-01-07 RX ORDER — METFORMIN HYDROCHLORIDE 500 MG/1
500 TABLET ORAL
COMMUNITY
Start: 2020-01-04 | End: 2020-01-07

## 2020-01-07 ASSESSMENT — ENCOUNTER SYMPTOMS
DIARRHEA: 0
FREQUENCY: 0
FATIGUE: 0
VOMITING: 0
UNEXPECTED WEIGHT CHANGE: 0
ANAL BLEEDING: 0
SHORTNESS OF BREATH: 0
LIGHT-HEADEDNESS: 0
CHEST TIGHTNESS: 0
BLOOD IN STOOL: 0
BRUISES/BLEEDS EASILY: 0
PALPITATIONS: 0
DIAPHORESIS: 0
DIZZINESS: 0
NAUSEA: 0
WHEEZING: 0
POLYDIPSIA: 0
ABDOMINAL PAIN: 0

## 2020-01-07 NOTE — PATIENT INSTRUCTIONS
Please have fasting labs done at [facility] in the next week or 2.  Continue to work on reduction of sweets, carbohydrates, and sugary beverages.  Remain physically active.  Based on cholesterol results, we should consider a statin medication for prevention.    Continue to check home blood pressures 1 to 2 days/week and call me if >140/90.    Otherwise, have CT scan done prior to next visit.     Please bring in your prior pneumonia vaccine records.

## 2020-01-07 NOTE — ASSESSMENT & PLAN NOTE
-Well-controlled by end of visit within goal of <140/90  -This correlates with home values which have been in the 120s over 70s  -Continue to focus on sodium reduction  -Continue current medications

## 2020-01-07 NOTE — PROGRESS NOTES
"Subjective      Patient ID: Rhys Campos is a 64 y.o. male.  1955      Here for diabetes follow-up    Diabetes  -Remains off medication and dietarily controlled  -Due for microalbumin this month  -Ophthalmologist: > 1 yr ago. Denies any visual changes  -denies urinary frequency, n/v/d, abd pain.     Elevated coronary calcium score  -Had a negative stress echocardiogram on 9/17/2019  -Never started on statin by cardiologist  -3-4 days/wk at gym- treadmill/weights with no cp, sob, dizziness, lh.    Thrombocytopenia  -Never had repeat platelet count performed  -denies brbpr, epistaxis, hematuria, or bruising      The following have been reviewed and updated as appropriate in this visit:  Tobacco  Allergies  Meds  Problems  Med Hx  Surg Hx  Fam Hx       Review of Systems   Constitutional: Negative for diaphoresis, fatigue and unexpected weight change.   Respiratory: Negative for chest tightness, shortness of breath and wheezing.    Cardiovascular: Negative for chest pain, palpitations and leg swelling.   Gastrointestinal: Negative for abdominal pain, anal bleeding, blood in stool, diarrhea, nausea and vomiting.   Endocrine: Negative for polydipsia and polyuria.   Genitourinary: Negative for frequency.   Neurological: Negative for dizziness, syncope and light-headedness.   Hematological: Does not bruise/bleed easily.       Objective     Vitals:    01/07/20 0928 01/07/20 1002 01/07/20 1008   BP: 120/82 (!) 142/76 132/80   BP Location:  Left upper arm Left upper arm   Patient Position:  Sitting Sitting   Pulse: 69     Resp: 12     SpO2: 99%     Weight: 85.7 kg (189 lb)     Height: 1.753 m (5' 9\")       Body mass index is 27.91 kg/m².    Physical Exam   Constitutional: He is oriented to person, place, and time. He appears well-developed and well-nourished. No distress.   HENT:   Head: Normocephalic and atraumatic.   Mouth/Throat: Oropharynx is clear and moist.   Eyes:   Mild exophthalmos at baseline "   Cardiovascular: Normal rate and regular rhythm. Exam reveals no gallop and no friction rub.   Murmur (1/6 perfecto hear dbest LSB) heard.  Pulmonary/Chest: Effort normal and breath sounds normal. No stridor. No respiratory distress. He has no wheezes. He has no rales.   Abdominal: Soft. Bowel sounds are normal. He exhibits no distension and no mass. There is no tenderness. There is no rebound and no guarding.   Musculoskeletal: He exhibits no edema.   Neurological: He is alert and oriented to person, place, and time.   Skin: He is not diaphoretic.   Psychiatric: He has a normal mood and affect. His behavior is normal. Judgment and thought content normal.       Assessment/Plan   Diagnoses and all orders for this visit:    Diet-controlled diabetes mellitus (CMS/[facility]) (Primary)  Assessment & Plan:  -Continue to follow every 6 months off medication  -Strongly encouraged ophthalmology annually for both diabetic eye exam and given hx of Graves' with mild exophthalmos on exam at baseline (asx)  -He will bring in proof of PNA vaccine to the office    Orders:  -     Comprehensive metabolic panel; Future  -     CBC and Differential; Future  -     Hemoglobin A1c; Future  -     Microalbumin/Creatinine Ur Random    Essential hypertension  Assessment & Plan:  -Well-controlled by end of visit within goal of <140/90  -This correlates with home values which have been in the 120s over 70s  -Continue to focus on sodium reduction  -Continue current medications      Coronary artery calcification seen on CT scan  Assessment & Plan:  -Status post negative stress echocardiogram  -Remains very physically active with no cardiopulmonary symptoms  -Cardiologist did not apparently recommend statin, but I will encourage this if his LDL remains >70    Orders:  -     Lipid panel; Future    Multiple lung nodules on CT  Assessment & Plan:  -12 mm groundglass nodule in September.  Unchanged compared to study just prior, but 6-month follow-up recommended  by radiologist, so we will repeat in March-order provided  -He is asymptomatic and lung exam is normal    Orders:  -     CT CHEST WITHOUT IV CONTRAST; Future    Polyp of colon, unspecified part of colon, unspecified type  Assessment & Plan:  -Tubular adenomae x3 on 10/17/19-3-year follow-up recommended      Postablative hypothyroidism  Assessment & Plan:  -Remains clinically euthyroid  -Since last TSH was high normal, we will trend    Orders:  -     TSH 3rd Generation; Future

## 2020-01-07 NOTE — ASSESSMENT & PLAN NOTE
-Continue to follow every 6 months off medication  -Strongly encouraged ophthalmology annually for both diabetic eye exam and given hx of Graves' with mild exophthalmos on exam at baseline (asx)  -He will bring in proof of PNA vaccine to the office

## 2020-01-07 NOTE — ASSESSMENT & PLAN NOTE
-12 mm groundglass nodule in September.  Unchanged compared to study just prior, but 6-month follow-up recommended by radiologist, so we will repeat in March-order provided  -He is asymptomatic and lung exam is normal

## 2020-01-07 NOTE — ASSESSMENT & PLAN NOTE
-Status post negative stress echocardiogram  -Remains very physically active with no cardiopulmonary symptoms  -Cardiologist did not apparently recommend statin, but I will encourage this if his LDL remains >70

## 2020-01-08 PROBLEM — E11.29 MICROALBUMINURIA DUE TO TYPE 2 DIABETES MELLITUS (CMS/HCC)  (CMS/HCC): Status: ACTIVE | Noted: 2020-01-08

## 2020-01-08 PROBLEM — R80.9 MICROALBUMINURIA DUE TO TYPE 2 DIABETES MELLITUS (CMS/HCC)  (CMS/HCC): Status: ACTIVE | Noted: 2020-01-08

## 2020-01-08 LAB
ALBUMIN/CREAT UR: 35 MCG/MG CREAT
CREAT UR-MCNC: 78 MG/DL (ref 20–320)
MICROALBUMIN UR-MCNC: 2.7 MG/DL

## 2020-01-09 NOTE — TELEPHONE ENCOUNTER
Wife, Suellen called back and said patient does not need a prior auth with his insurance.  He is going to [facility] and he is scheduled 4-16-20.

## 2020-01-23 ENCOUNTER — APPOINTMENT (OUTPATIENT)
Dept: LAB | Age: 65
End: 2020-01-23
Attending: FAMILY MEDICINE
Payer: COMMERCIAL

## 2020-01-23 DIAGNOSIS — I25.10 CORONARY ARTERY CALCIFICATION SEEN ON CT SCAN: ICD-10-CM

## 2020-01-23 DIAGNOSIS — E11.9 DIET-CONTROLLED DIABETES MELLITUS (CMS/HCC): ICD-10-CM

## 2020-01-23 DIAGNOSIS — E89.0 POSTABLATIVE HYPOTHYROIDISM: ICD-10-CM

## 2020-01-23 LAB
ALBUMIN SERPL-MCNC: 4 G/DL (ref 3.4–5)
ALP SERPL-CCNC: 45 IU/L (ref 35–126)
ALT SERPL-CCNC: 23 IU/L (ref 16–63)
ANION GAP SERPL CALC-SCNC: 8 MEQ/L (ref 3–15)
AST SERPL-CCNC: 29 IU/L (ref 15–41)
BASOPHILS # BLD: 0.03 K/UL (ref 0.01–0.1)
BASOPHILS NFR BLD: 0.5 %
BILIRUB SERPL-MCNC: 0.5 MG/DL (ref 0.3–1.2)
BUN SERPL-MCNC: 14 MG/DL (ref 8–20)
CALCIUM SERPL-MCNC: 9 MG/DL (ref 8.9–10.3)
CHLORIDE SERPL-SCNC: 108 MEQ/L (ref 98–109)
CHOLEST SERPL-MCNC: 147 MG/DL
CO2 SERPL-SCNC: 26 MEQ/L (ref 22–32)
CREAT SERPL-MCNC: 1 MG/DL
DIFFERENTIAL METHOD BLD: ABNORMAL
EOSINOPHIL # BLD: 0.3 K/UL (ref 0.04–0.54)
EOSINOPHIL NFR BLD: 5.3 %
ERYTHROCYTE [DISTWIDTH] IN BLOOD BY AUTOMATED COUNT: 13.2 % (ref 11.6–14.4)
EST. AVERAGE GLUCOSE BLD GHB EST-MCNC: 140 MG/DL
GFR SERPL CREATININE-BSD FRML MDRD: >60 ML/MIN/1.73M*2
GLUCOSE SERPL-MCNC: 100 MG/DL (ref 70–99)
HBA1C MFR BLD HPLC: 6.5 %
HCT VFR BLDCO AUTO: 42.1 % (ref 40.1–51)
HDLC SERPL-MCNC: 35 MG/DL
HDLC SERPL: 4.2 {RATIO}
HGB BLD-MCNC: 13.7 G/DL
IMM GRANULOCYTES # BLD AUTO: 0.01 K/UL (ref 0–0.08)
IMM GRANULOCYTES NFR BLD AUTO: 0.2 %
LDLC SERPL CALC-MCNC: 99 MG/DL
LYMPHOCYTES # BLD: 2.02 K/UL (ref 1.2–3.5)
LYMPHOCYTES NFR BLD: 35.9 %
MCH RBC QN AUTO: 30.2 PG (ref 28–33.2)
MCHC RBC AUTO-ENTMCNC: 32.5 G/DL (ref 32.2–36.5)
MCV RBC AUTO: 92.9 FL (ref 83–98)
MONOCYTES # BLD: 0.58 K/UL (ref 0.3–1)
MONOCYTES NFR BLD: 10.3 %
NEUTROPHILS # BLD: 2.68 K/UL (ref 1.7–7)
NEUTS SEG NFR BLD: 47.8 %
NONHDLC SERPL-MCNC: 112 MG/DL
NRBC BLD-RTO: 0 %
PDW BLD AUTO: 13.7 FL (ref 9.4–12.4)
PLATELET # BLD AUTO: 137 K/UL
POTASSIUM SERPL-SCNC: 4.3 MEQ/L (ref 3.6–5.1)
PROT SERPL-MCNC: 7.1 G/DL (ref 6–8.2)
RBC # BLD AUTO: 4.53 M/UL (ref 4.5–5.8)
SODIUM SERPL-SCNC: 142 MEQ/L (ref 136–144)
TRIGL SERPL-MCNC: 66 MG/DL (ref 30–149)
TSH SERPL DL<=0.05 MIU/L-ACNC: 4.12 MIU/L (ref 0.34–5.6)
WBC # BLD AUTO: 5.62 K/UL

## 2020-01-23 PROCEDURE — 80053 COMPREHEN METABOLIC PANEL: CPT

## 2020-01-23 PROCEDURE — 84443 ASSAY THYROID STIM HORMONE: CPT

## 2020-01-23 PROCEDURE — 80061 LIPID PANEL: CPT

## 2020-01-23 PROCEDURE — 85025 COMPLETE CBC W/AUTO DIFF WBC: CPT

## 2020-01-23 PROCEDURE — 36415 COLL VENOUS BLD VENIPUNCTURE: CPT

## 2020-01-23 PROCEDURE — 83036 HEMOGLOBIN GLYCOSYLATED A1C: CPT

## 2020-01-24 ENCOUNTER — TELEPHONE (OUTPATIENT)
Dept: FAMILY MEDICINE | Facility: CLINIC | Age: 65
End: 2020-01-24

## 2020-01-24 DIAGNOSIS — D69.6 THROMBOCYTOPENIA (CMS/HCC): ICD-10-CM

## 2020-01-24 DIAGNOSIS — E11.9 DIET-CONTROLLED DIABETES MELLITUS (CMS/HCC): ICD-10-CM

## 2020-01-24 DIAGNOSIS — E78.00 PURE HYPERCHOLESTEROLEMIA: Primary | ICD-10-CM

## 2020-01-24 RX ORDER — ATORVASTATIN CALCIUM 10 MG/1
10 TABLET, FILM COATED ORAL DAILY
Qty: 90 TABLET | Refills: 0 | Status: SHIPPED | OUTPATIENT
Start: 2020-01-24 | End: 2020-04-23

## 2020-01-24 NOTE — TELEPHONE ENCOUNTER
Spoke with patient regarding lab results  -TSH appropriate-continue current dose of levothyroxine  -A1c 6.5%-stable.  Continue dietary control of his diabetes since within goal of <7% off medication  -LDL 99, which is reasonable, but given elevated CVD risk and known coronary calcium deposition, start atorvastatin 10 mg daily.  He agrees to this and to monitor for and call immediately if any symptoms of hepatotoxicity or myopathy.  Repeat LFTs and lipids in 3 months prior to next visit fasting    -Lastly, thrombocytopenia slightly worsened-has seen hematologist in the recent past.  Labs sent to Dr. Armas and Rhys agrees to see him in the next month or 2.  Avoid NSAIDs and contact me immediately for any inappropriate bruising or active bleeding.    He is happy with plan.

## 2020-03-23 RX ORDER — LISINOPRIL AND HYDROCHLOROTHIAZIDE 12.5; 2 MG/1; MG/1
1 TABLET ORAL
Qty: 90 TABLET | Refills: 0 | Status: SHIPPED | OUTPATIENT
Start: 2020-03-23 | End: 2020-06-22

## 2020-03-23 NOTE — TELEPHONE ENCOUNTER
Medicine Refill Request    Last Office Visit: 1/7/2020  Next Office Visit: 5/7/2020        Current Outpatient Medications:   •  amLODIPine (NORVASC) 10 mg tablet, Take 1 tablet (10 mg total) by mouth once daily., Disp: 90 tablet, Rfl: 1  •  atorvastatin (LIPITOR) 10 mg tablet, Take 1 tablet (10 mg total) by mouth daily., Disp: 90 tablet, Rfl: 0  •  levothyroxine (SYNTHROID) 125 mcg tablet, take 1 tablet by mouth once daily, Disp: 90 tablet, Rfl: 3  •  lisinopril-hydrochlorothiazide (PRINZIDE,ZESTORETIC) 20-12.5 mg per tablet, take 1 tablet by mouth once daily, Disp: 90 tablet, Rfl: 0      BP Readings from Last 3 Encounters:   01/07/20 132/80   09/17/19 130/72   09/04/19 130/72       Recent Lab results:  Lab Results   Component Value Date    CHOL 147 01/23/2020   ,   Lab Results   Component Value Date    HDL 35 (L) 01/23/2020   ,   Lab Results   Component Value Date    LDLCALC 99 01/23/2020   ,   Lab Results   Component Value Date    TRIG 66 01/23/2020        Lab Results   Component Value Date    GLUCOSE 100 (H) 01/23/2020   ,   Lab Results   Component Value Date    HGBA1C 6.5 (H) 01/23/2020         Lab Results   Component Value Date    CREATININE 1.0 01/23/2020       Lab Results   Component Value Date    TSH 4.12 01/23/2020

## 2020-04-10 RX ORDER — METFORMIN HYDROCHLORIDE 500 MG/1
TABLET ORAL
Qty: 90 TABLET | Refills: 3 | Status: SHIPPED | OUTPATIENT
Start: 2020-04-10 | End: 2020-10-20

## 2020-04-10 NOTE — TELEPHONE ENCOUNTER
Patient ws on med in the past            Medicine Refill Request    Last Office Visit: 11/9/2018  Next Office Visit: Visit date not found        Current Outpatient Medications:   •  amLODIPine (NORVASC) 10 mg tablet, Take 1 tablet (10 mg total) by mouth once daily., Disp: 90 tablet, Rfl: 1  •  atorvastatin (LIPITOR) 10 mg tablet, Take 1 tablet (10 mg total) by mouth daily., Disp: 90 tablet, Rfl: 0  •  levothyroxine (SYNTHROID) 125 mcg tablet, take 1 tablet by mouth once daily, Disp: 90 tablet, Rfl: 3  •  lisinopriL-hydrochlorothiazide (PRINZIDE,ZESTORETIC) 20-12.5 mg per tablet, take 1 tablet by mouth once daily, Disp: 90 tablet, Rfl: 0      BP Readings from Last 3 Encounters:   01/07/20 132/80   09/17/19 130/72   09/04/19 130/72       Recent Lab results:  Lab Results   Component Value Date    CHOL 147 01/23/2020   ,   Lab Results   Component Value Date    HDL 35 (L) 01/23/2020   ,   Lab Results   Component Value Date    LDLCALC 99 01/23/2020   ,   Lab Results   Component Value Date    TRIG 66 01/23/2020        Lab Results   Component Value Date    GLUCOSE 100 (H) 01/23/2020   ,   Lab Results   Component Value Date    HGBA1C 6.5 (H) 01/23/2020         Lab Results   Component Value Date    CREATININE 1.0 01/23/2020       Lab Results   Component Value Date    TSH 4.12 01/23/2020

## 2020-04-23 ENCOUNTER — TELEPHONE (OUTPATIENT)
Dept: FAMILY MEDICINE | Facility: CLINIC | Age: 65
End: 2020-04-23

## 2020-04-23 RX ORDER — AMLODIPINE BESYLATE 10 MG/1
TABLET ORAL
Qty: 90 TABLET | Refills: 1 | Status: SHIPPED | OUTPATIENT
Start: 2020-04-23 | End: 2020-10-16

## 2020-04-23 RX ORDER — ATORVASTATIN CALCIUM 10 MG/1
TABLET, FILM COATED ORAL
Qty: 30 TABLET | Refills: 0 | Status: SHIPPED | OUTPATIENT
Start: 2020-04-23 | End: 2020-10-20

## 2020-04-23 NOTE — TELEPHONE ENCOUNTER
Medicine Refill Request    Last Office Visit: 1/7/2020  Next Office Visit: 6/19/2020        Current Outpatient Medications:   •  amLODIPine (NORVASC) 10 mg tablet, Take 1 tablet (10 mg total) by mouth once daily., Disp: 90 tablet, Rfl: 1  •  atorvastatin (LIPITOR) 10 mg tablet, Take 1 tablet (10 mg total) by mouth daily., Disp: 90 tablet, Rfl: 0  •  levothyroxine (SYNTHROID) 125 mcg tablet, take 1 tablet by mouth once daily, Disp: 90 tablet, Rfl: 3  •  lisinopriL-hydrochlorothiazide (PRINZIDE,ZESTORETIC) 20-12.5 mg per tablet, take 1 tablet by mouth once daily, Disp: 90 tablet, Rfl: 0  •  metFORMIN (GLUCOPHAGE) 500 mg tablet, take 1 tablet by mouth AT NIGHT as directed, Disp: 90 tablet, Rfl: 3      BP Readings from Last 3 Encounters:   01/07/20 132/80   09/17/19 130/72   09/04/19 130/72       Recent Lab results:  Lab Results   Component Value Date    CHOL 147 01/23/2020   ,   Lab Results   Component Value Date    HDL 35 (L) 01/23/2020   ,   Lab Results   Component Value Date    LDLCALC 99 01/23/2020   ,   Lab Results   Component Value Date    TRIG 66 01/23/2020        Lab Results   Component Value Date    GLUCOSE 100 (H) 01/23/2020   ,   Lab Results   Component Value Date    HGBA1C 6.5 (H) 01/23/2020         Lab Results   Component Value Date    CREATININE 1.0 01/23/2020       Lab Results   Component Value Date    TSH 4.12 01/23/2020

## 2020-04-23 NOTE — TELEPHONE ENCOUNTER
Medicine Refill Request    Last Office Visit: 1/7/2020  Next Office Visit: 6/19/2020    Pharmacy called to request refills for pt.  Atorvastatin 10mg and   Amlodipine 10mg    *I saw that pt was asked to follow up in 4 months after last visit.  I did leave him a voicemail asking him to call back for a telemed appt.    Current Outpatient Medications:   •  amLODIPine (NORVASC) 10 mg tablet, Take 1 tablet (10 mg total) by mouth once daily., Disp: 90 tablet, Rfl: 1  •  atorvastatin (LIPITOR) 10 mg tablet, Take 1 tablet (10 mg total) by mouth daily., Disp: 90 tablet, Rfl: 0  •  levothyroxine (SYNTHROID) 125 mcg tablet, take 1 tablet by mouth once daily, Disp: 90 tablet, Rfl: 3  •  lisinopriL-hydrochlorothiazide (PRINZIDE,ZESTORETIC) 20-12.5 mg per tablet, take 1 tablet by mouth once daily, Disp: 90 tablet, Rfl: 0  •  metFORMIN (GLUCOPHAGE) 500 mg tablet, take 1 tablet by mouth AT NIGHT as directed, Disp: 90 tablet, Rfl: 3      BP Readings from Last 3 Encounters:   01/07/20 132/80   09/17/19 130/72   09/04/19 130/72       Recent Lab results:  Lab Results   Component Value Date    CHOL 147 01/23/2020   ,   Lab Results   Component Value Date    HDL 35 (L) 01/23/2020   ,   Lab Results   Component Value Date    LDLCALC 99 01/23/2020   ,   Lab Results   Component Value Date    TRIG 66 01/23/2020        Lab Results   Component Value Date    GLUCOSE 100 (H) 01/23/2020   ,   Lab Results   Component Value Date    HGBA1C 6.5 (H) 01/23/2020         Lab Results   Component Value Date    CREATININE 1.0 01/23/2020       Lab Results   Component Value Date    TSH 4.12 01/23/2020

## 2020-05-20 ENCOUNTER — APPOINTMENT (EMERGENCY)
Dept: RADIOLOGY | Facility: HOSPITAL | Age: 65
DRG: 417 | End: 2020-05-20
Attending: EMERGENCY MEDICINE
Payer: COMMERCIAL

## 2020-05-20 ENCOUNTER — HOSPITAL ENCOUNTER (INPATIENT)
Facility: HOSPITAL | Age: 65
LOS: 2 days | Discharge: HOME | DRG: 417 | End: 2020-05-22
Attending: EMERGENCY MEDICINE | Admitting: SURGERY
Payer: COMMERCIAL

## 2020-05-20 DIAGNOSIS — K80.71 CALCULUS OF GALLBLADDER AND BILE DUCT WITH OBSTRUCTION WITHOUT CHOLECYSTITIS: Primary | ICD-10-CM

## 2020-05-20 DIAGNOSIS — K85.10 ACUTE BILIARY PANCREATITIS, UNSPECIFIED COMPLICATION STATUS: ICD-10-CM

## 2020-05-20 DIAGNOSIS — K81.9 CHOLECYSTITIS: ICD-10-CM

## 2020-05-20 LAB
ALBUMIN SERPL-MCNC: 4.3 G/DL (ref 3.4–5)
ALP SERPL-CCNC: 102 IU/L (ref 35–126)
ALT SERPL-CCNC: 353 IU/L (ref 16–63)
ANION GAP SERPL CALC-SCNC: 10 MEQ/L (ref 3–15)
AST SERPL-CCNC: 210 IU/L (ref 15–41)
BACTERIA URNS QL MICRO: NORMAL /HPF
BASOPHILS # BLD: 0.04 K/UL (ref 0.01–0.1)
BASOPHILS NFR BLD: 0.4 %
BILIRUB DIRECT SERPL-MCNC: 0.8 MG/DL
BILIRUB SERPL-MCNC: 1.8 MG/DL (ref 0.3–1.2)
BILIRUB UR QL STRIP.AUTO: NEGATIVE MG/DL
BUN SERPL-MCNC: 12 MG/DL (ref 8–20)
CALCIUM SERPL-MCNC: 8.7 MG/DL (ref 8.9–10.3)
CHLORIDE SERPL-SCNC: 103 MEQ/L (ref 98–109)
CLARITY UR REFRACT.AUTO: ABNORMAL
CO2 SERPL-SCNC: 23 MEQ/L (ref 22–32)
COLOR UR AUTO: YELLOW
CREAT SERPL-MCNC: 0.8 MG/DL (ref 0.8–1.3)
DIFFERENTIAL METHOD BLD: ABNORMAL
EOSINOPHIL # BLD: 0.12 K/UL (ref 0.04–0.54)
EOSINOPHIL NFR BLD: 1.2 %
ERYTHROCYTE [DISTWIDTH] IN BLOOD BY AUTOMATED COUNT: 12.9 % (ref 11.6–14.4)
GFR SERPL CREATININE-BSD FRML MDRD: >60 ML/MIN/1.73M*2
GLUCOSE SERPL-MCNC: 164 MG/DL (ref 70–99)
GLUCOSE UR STRIP.AUTO-MCNC: NEGATIVE MG/DL
HCT VFR BLDCO AUTO: 42.1 % (ref 40.1–51)
HGB BLD-MCNC: 14.1 G/DL (ref 13.7–17.5)
HGB UR QL STRIP.AUTO: ABNORMAL
HYALINE CASTS #/AREA URNS LPF: NORMAL /LPF
IMM GRANULOCYTES # BLD AUTO: 0.04 K/UL (ref 0–0.08)
IMM GRANULOCYTES NFR BLD AUTO: 0.4 %
KETONES UR STRIP.AUTO-MCNC: NEGATIVE MG/DL
LEUKOCYTE ESTERASE UR QL STRIP.AUTO: NEGATIVE
LIPASE SERPL-CCNC: 1650 U/L (ref 20–51)
LYMPHOCYTES # BLD: 1.53 K/UL (ref 1.2–3.5)
LYMPHOCYTES NFR BLD: 15.9 %
MCH RBC QN AUTO: 30.9 PG (ref 28–33.2)
MCHC RBC AUTO-ENTMCNC: 33.5 G/DL (ref 32.2–36.5)
MCV RBC AUTO: 92.1 FL (ref 83–98)
MONOCYTES # BLD: 0.64 K/UL (ref 0.3–1)
MONOCYTES NFR BLD: 6.7 %
NEUTROPHILS # BLD: 7.25 K/UL (ref 1.7–7)
NEUTS SEG NFR BLD: 75.4 %
NITRITE UR QL STRIP.AUTO: NEGATIVE
NRBC BLD-RTO: 0 %
PDW BLD AUTO: 11.9 FL (ref 9.4–12.4)
PH UR STRIP.AUTO: 7 [PH]
PLATELET # BLD AUTO: 162 K/UL (ref 150–350)
POTASSIUM SERPL-SCNC: 3.6 MEQ/L (ref 3.6–5.1)
PROT SERPL-MCNC: 7.9 G/DL (ref 6–8.2)
PROT UR QL STRIP.AUTO: ABNORMAL
RBC # BLD AUTO: 4.57 M/UL (ref 4.5–5.8)
RBC #/AREA URNS HPF: NORMAL /HPF
SARS-COV-2 RNA RESP QL NAA+PROBE: NEGATIVE
SODIUM SERPL-SCNC: 136 MEQ/L (ref 136–144)
SP GR UR REFRACT.AUTO: 1.01
SQUAMOUS URNS QL MICRO: NORMAL /HPF
UROBILINOGEN UR STRIP-ACNC: 1 EU/DL
WBC # BLD AUTO: 9.62 K/UL (ref 3.8–10.5)
WBC #/AREA URNS HPF: NORMAL /HPF

## 2020-05-20 PROCEDURE — 25000000 HC PHARMACY GENERAL: Performed by: SURGERY

## 2020-05-20 PROCEDURE — 85025 COMPLETE CBC W/AUTO DIFF WBC: CPT | Performed by: PHYSICIAN ASSISTANT

## 2020-05-20 PROCEDURE — 63700000 HC SELF-ADMINISTRABLE DRUG: Performed by: SURGERY

## 2020-05-20 PROCEDURE — 63600000 HC DRUGS/DETAIL CODE: Performed by: SURGERY

## 2020-05-20 PROCEDURE — 63600000 HC DRUGS/DETAIL CODE: Performed by: PHYSICIAN ASSISTANT

## 2020-05-20 PROCEDURE — 74177 CT ABD & PELVIS W/CONTRAST: CPT

## 2020-05-20 PROCEDURE — 96374 THER/PROPH/DIAG INJ IV PUSH: CPT | Mod: 59

## 2020-05-20 PROCEDURE — 81003 URINALYSIS AUTO W/O SCOPE: CPT | Performed by: PHYSICIAN ASSISTANT

## 2020-05-20 PROCEDURE — 25800000 HC PHARMACY IV SOLUTIONS: Performed by: PHYSICIAN ASSISTANT

## 2020-05-20 PROCEDURE — 12000000 HC ROOM AND CARE MED/SURG

## 2020-05-20 PROCEDURE — 99285 EMERGENCY DEPT VISIT HI MDM: CPT | Mod: 25

## 2020-05-20 PROCEDURE — 83690 ASSAY OF LIPASE: CPT | Performed by: PHYSICIAN ASSISTANT

## 2020-05-20 PROCEDURE — U0002 COVID-19 LAB TEST NON-CDC: HCPCS | Performed by: PHYSICIAN ASSISTANT

## 2020-05-20 PROCEDURE — 82040 ASSAY OF SERUM ALBUMIN: CPT | Performed by: PHYSICIAN ASSISTANT

## 2020-05-20 PROCEDURE — 63600105 HC IODINE BASED CONTRAST: Performed by: PHYSICIAN ASSISTANT

## 2020-05-20 PROCEDURE — 96361 HYDRATE IV INFUSION ADD-ON: CPT | Mod: 59

## 2020-05-20 PROCEDURE — 82248 BILIRUBIN DIRECT: CPT | Performed by: PHYSICIAN ASSISTANT

## 2020-05-20 PROCEDURE — 93005 ELECTROCARDIOGRAM TRACING: CPT | Performed by: EMERGENCY MEDICINE

## 2020-05-20 PROCEDURE — A9585 GADOBUTROL INJECTION: HCPCS | Mod: JW | Performed by: PHYSICIAN ASSISTANT

## 2020-05-20 PROCEDURE — 76705 ECHO EXAM OF ABDOMEN: CPT

## 2020-05-20 PROCEDURE — 96376 TX/PRO/DX INJ SAME DRUG ADON: CPT | Mod: 59

## 2020-05-20 PROCEDURE — 74183 MRI ABD W/O CNTR FLWD CNTR: CPT

## 2020-05-20 PROCEDURE — 36415 COLL VENOUS BLD VENIPUNCTURE: CPT | Performed by: PHYSICIAN ASSISTANT

## 2020-05-20 RX ORDER — POTASSIUM CHLORIDE 20 MEQ/1
20 TABLET, EXTENDED RELEASE ORAL AS NEEDED
Status: DISCONTINUED | OUTPATIENT
Start: 2020-05-20 | End: 2020-05-22 | Stop reason: HOSPADM

## 2020-05-20 RX ORDER — MORPHINE SULFATE 2 MG/ML
2 INJECTION, SOLUTION INTRAMUSCULAR; INTRAVENOUS ONCE
Status: COMPLETED | OUTPATIENT
Start: 2020-05-20 | End: 2020-05-20

## 2020-05-20 RX ORDER — DIPHENHYDRAMINE HCL 50 MG/ML
25 VIAL (ML) INJECTION EVERY 6 HOURS PRN
Status: DISCONTINUED | OUTPATIENT
Start: 2020-05-20 | End: 2020-05-22 | Stop reason: HOSPADM

## 2020-05-20 RX ORDER — GADOBUTROL 604.72 MG/ML
0.1 INJECTION INTRAVENOUS ONCE
Status: COMPLETED | OUTPATIENT
Start: 2020-05-20 | End: 2020-05-20

## 2020-05-20 RX ORDER — POTASSIUM CHLORIDE 20 MEQ/1
40 TABLET, EXTENDED RELEASE ORAL AS NEEDED
Status: DISCONTINUED | OUTPATIENT
Start: 2020-05-20 | End: 2020-05-22 | Stop reason: HOSPADM

## 2020-05-20 RX ORDER — OXYCODONE HYDROCHLORIDE 5 MG/1
5 TABLET ORAL EVERY 4 HOURS PRN
Status: DISCONTINUED | OUTPATIENT
Start: 2020-05-20 | End: 2020-05-22 | Stop reason: HOSPADM

## 2020-05-20 RX ORDER — SODIUM CHLORIDE, SODIUM LACTATE, POTASSIUM CHLORIDE, CALCIUM CHLORIDE 600; 310; 30; 20 MG/100ML; MG/100ML; MG/100ML; MG/100ML
INJECTION, SOLUTION INTRAVENOUS CONTINUOUS
Status: ACTIVE | OUTPATIENT
Start: 2020-05-20 | End: 2020-05-21

## 2020-05-20 RX ORDER — HYDROMORPHONE HYDROCHLORIDE 1 MG/ML
0.5 INJECTION, SOLUTION INTRAMUSCULAR; INTRAVENOUS; SUBCUTANEOUS ONCE
Status: DISCONTINUED | OUTPATIENT
Start: 2020-05-20 | End: 2020-05-20

## 2020-05-20 RX ORDER — DEXTROSE 40 %
15-30 GEL (GRAM) ORAL AS NEEDED
Status: DISCONTINUED | OUTPATIENT
Start: 2020-05-20 | End: 2020-05-22 | Stop reason: HOSPADM

## 2020-05-20 RX ORDER — DEXTROSE 50 % IN WATER (D50W) INTRAVENOUS SYRINGE
25 AS NEEDED
Status: DISCONTINUED | OUTPATIENT
Start: 2020-05-20 | End: 2020-05-22 | Stop reason: HOSPADM

## 2020-05-20 RX ORDER — ACETAMINOPHEN 325 MG/1
650 TABLET ORAL EVERY 4 HOURS PRN
Status: DISCONTINUED | OUTPATIENT
Start: 2020-05-20 | End: 2020-05-22 | Stop reason: HOSPADM

## 2020-05-20 RX ORDER — METRONIDAZOLE 500 MG/100ML
500 INJECTION, SOLUTION INTRAVENOUS
Status: DISCONTINUED | OUTPATIENT
Start: 2020-05-20 | End: 2020-05-22 | Stop reason: HOSPADM

## 2020-05-20 RX ORDER — HEPARIN SODIUM 5000 [USP'U]/ML
5000 INJECTION, SOLUTION INTRAVENOUS; SUBCUTANEOUS EVERY 8 HOURS
Status: DISCONTINUED | OUTPATIENT
Start: 2020-05-21 | End: 2020-05-22 | Stop reason: HOSPADM

## 2020-05-20 RX ORDER — ONDANSETRON HYDROCHLORIDE 2 MG/ML
4 INJECTION, SOLUTION INTRAVENOUS EVERY 6 HOURS PRN
Status: DISCONTINUED | OUTPATIENT
Start: 2020-05-20 | End: 2020-05-22 | Stop reason: HOSPADM

## 2020-05-20 RX ORDER — ALUMINUM HYDROXIDE, MAGNESIUM HYDROXIDE, AND SIMETHICONE 1200; 120; 1200 MG/30ML; MG/30ML; MG/30ML
30 SUSPENSION ORAL EVERY 4 HOURS PRN
Status: DISCONTINUED | OUTPATIENT
Start: 2020-05-20 | End: 2020-05-22 | Stop reason: HOSPADM

## 2020-05-20 RX ORDER — DOCUSATE SODIUM 100 MG/1
100 CAPSULE, LIQUID FILLED ORAL 2 TIMES DAILY
Status: DISCONTINUED | OUTPATIENT
Start: 2020-05-20 | End: 2020-05-22 | Stop reason: HOSPADM

## 2020-05-20 RX ORDER — HYDROMORPHONE HYDROCHLORIDE 1 MG/ML
0.25 INJECTION, SOLUTION INTRAMUSCULAR; INTRAVENOUS; SUBCUTANEOUS EVERY 4 HOURS PRN
Status: DISCONTINUED | OUTPATIENT
Start: 2020-05-20 | End: 2020-05-22 | Stop reason: HOSPADM

## 2020-05-20 RX ORDER — POTASSIUM CHLORIDE 14.9 MG/ML
20 INJECTION INTRAVENOUS AS NEEDED
Status: DISCONTINUED | OUTPATIENT
Start: 2020-05-20 | End: 2020-05-22 | Stop reason: HOSPADM

## 2020-05-20 RX ORDER — IBUPROFEN 200 MG
16-32 TABLET ORAL AS NEEDED
Status: DISCONTINUED | OUTPATIENT
Start: 2020-05-20 | End: 2020-05-22 | Stop reason: HOSPADM

## 2020-05-20 RX ADMIN — SODIUM CHLORIDE 500 ML: 9 INJECTION, SOLUTION INTRAVENOUS at 16:37

## 2020-05-20 RX ADMIN — GADOBUTROL 8.4 MMOL: 604.72 INJECTION INTRAVENOUS at 20:45

## 2020-05-20 RX ADMIN — MORPHINE SULFATE 2 MG: 2 INJECTION, SOLUTION INTRAMUSCULAR; INTRAVENOUS at 18:09

## 2020-05-20 RX ADMIN — DOCUSATE SODIUM 100 MG: 100 CAPSULE, LIQUID FILLED ORAL at 21:20

## 2020-05-20 RX ADMIN — CEFTRIAXONE SODIUM 1 G: 1 INJECTION, POWDER, FOR SOLUTION INTRAVENOUS at 21:19

## 2020-05-20 RX ADMIN — METRONIDAZOLE 500 MG: 500 INJECTION, SOLUTION INTRAVENOUS at 21:20

## 2020-05-20 RX ADMIN — SODIUM CHLORIDE, POTASSIUM CHLORIDE, SODIUM LACTATE AND CALCIUM CHLORIDE: 600; 310; 30; 20 INJECTION, SOLUTION INTRAVENOUS at 22:51

## 2020-05-20 RX ADMIN — MORPHINE SULFATE 2 MG: 2 INJECTION, SOLUTION INTRAMUSCULAR; INTRAVENOUS at 16:37

## 2020-05-20 RX ADMIN — IOHEXOL 120 ML: 350 INJECTION, SOLUTION INTRAVENOUS at 17:50

## 2020-05-20 ASSESSMENT — ENCOUNTER SYMPTOMS
VOMITING: 0
RHINORRHEA: 0
CONSTIPATION: 0
NUMBNESS: 0
BACK PAIN: 0
FLANK PAIN: 0
CONFUSION: 0
LIGHT-HEADEDNESS: 0
COUGH: 0
HEMATURIA: 0
SORE THROAT: 0
DIARRHEA: 0
SHORTNESS OF BREATH: 0
DIZZINESS: 0
ARTHRALGIAS: 0
DECREASED CONCENTRATION: 0
FEVER: 0
WEAKNESS: 0
FREQUENCY: 0
NAUSEA: 0
MYALGIAS: 0
JOINT SWELLING: 0
CHEST TIGHTNESS: 0
HEADACHES: 0
DYSURIA: 0
DIAPHORESIS: 0
ABDOMINAL DISTENTION: 0
TREMORS: 0
CHILLS: 0
COLOR CHANGE: 0
ABDOMINAL PAIN: 1
DIFFICULTY URINATING: 0

## 2020-05-20 NOTE — ED PROVIDER NOTES
HPI     Chief Complaint   Patient presents with   • Abdominal Pain       Patient is a 65 y/o male with PMH BPH, HTN, HLD, DM type II, who presents c/o abdominal pain x 1 day   Patient reports PMH gallstones - denies cholecystectomy   Patient reports he has had RUQ and RLQ pain since last evening   Patient states he has associated decreased appetite   Patient denies fever, chills, sweats, n/v/d, chest pain, SOB, OWENS      History provided by:  Patient   used: No    Abdominal Pain   Pain location:  RUQ and RLQ  Pain quality: aching and cramping    Pain radiates to:  RUQ and RLQ  Pain severity:  Moderate  Onset quality:  Gradual  Duration:  1 day  Timing:  Constant  Progression:  Worsening  Chronicity:  Recurrent  Context: eating    Context: not alcohol use, not awakening from sleep and not diet changes    Associated symptoms: no chills, no constipation, no cough, no diarrhea, no dysuria, no fever, no hematuria, no nausea, no shortness of breath, no sore throat and no vomiting         Patient History     Past Medical History:   Diagnosis Date   • BPH (benign prostatic hyperplasia)    • Graves disease    • Hypertension    • Lipid disorder    • Male erectile dysfunction    • Type 2 diabetes mellitus (CMS/HCC)        Past Surgical History:   Procedure Laterality Date   • COLONOSCOPY     • EYE SURGERY Bilateral     from Graves   • SINUS SURGERY         Family History   Problem Relation Age of Onset   • Other Biological Mother         natural causes -  at age 75   • Hypertension Biological Father    • Other Biological Father         natural causes -  at age 78   • No Known Problems Biological Sister        Social History     Tobacco Use   • Smoking status: Current Every Day Smoker     Packs/day: 0.75     Years: 40.00     Pack years: 30.00     Types: Cigarettes     Start date:    • Smokeless tobacco: Current User   Substance Use Topics   • Alcohol use: No   • Drug use: No       Systems Reviewed  "from Nursing Triage:  Tobacco  Allergies  Problems  Med Hx  Surg Hx  Fam Hx  Soc Hx         Review of Systems     Review of Systems   Constitutional: Negative for chills, diaphoresis and fever.   HENT: Negative for congestion, rhinorrhea and sore throat.    Respiratory: Negative for cough, chest tightness and shortness of breath.    Gastrointestinal: Positive for abdominal pain. Negative for abdominal distention, constipation, diarrhea, nausea and vomiting.   Genitourinary: Negative for decreased urine volume, difficulty urinating, dysuria, flank pain, frequency, hematuria and urgency.   Musculoskeletal: Negative for arthralgias, back pain, gait problem, joint swelling and myalgias.   Skin: Negative for color change and rash.   Neurological: Negative for dizziness, tremors, syncope, weakness, light-headedness, numbness and headaches.   Psychiatric/Behavioral: Negative for confusion and decreased concentration.        Physical Exam     ED Triage Vitals   Temp Heart Rate Resp BP SpO2   05/20/20 1448 05/20/20 1448 05/20/20 1448 05/20/20 1448 05/20/20 1448   36.9 °C (98.4 °F) 65 20 (!) 145/82 99 %      Temp Source Heart Rate Source Patient Position BP Location FiO2 (%) (Set)   05/20/20 1448 -- 05/20/20 1801 05/20/20 1801 --   Oral  Lying Right upper arm                      Patient Vitals for the past 24 hrs:   BP Temp Temp src Pulse Resp SpO2 Height Weight   05/20/20 1801 (!) 164/82 36.8 °C (98.2 °F) Oral 84 18 100 % -- --   05/20/20 1448 (!) 145/82 36.9 °C (98.4 °F) Oral 65 20 99 % 1.753 m (5' 9\") 83.9 kg (185 lb)                                          Physical Exam   Constitutional: He is oriented to person, place, and time. He appears well-developed and well-nourished.  Non-toxic appearance. He does not appear ill. No distress.   HENT:   Head: Normocephalic and atraumatic.   Mouth/Throat: Oropharynx is clear and moist.   Eyes: Pupils are equal, round, and reactive to light. EOM are normal.   Cardiovascular: " Normal rate, regular rhythm, normal heart sounds and intact distal pulses.   Pulses:       Radial pulses are 2+ on the right side, and 2+ on the left side.        Dorsalis pedis pulses are 2+ on the right side, and 2+ on the left side.   Pulmonary/Chest: Effort normal and breath sounds normal.   Abdominal: Soft. Normal appearance. He exhibits no distension, no fluid wave and no abdominal bruit. There is no hepatosplenomegaly. There is tenderness in the right upper quadrant and right lower quadrant. There is no rigidity, no rebound, no guarding, no tenderness at McBurney's point and negative Olson's sign.   Neurological: He is alert and oriented to person, place, and time.   Skin: Skin is warm and dry. Capillary refill takes less than 2 seconds. No rash noted.   Psychiatric: He has a normal mood and affect. His behavior is normal.   Nursing note and vitals reviewed.           Procedures    ED Course & MDM     Labs Reviewed   CBC AND DIFF - Abnormal       Result Value    WBC 9.62      RBC 4.57      Hemoglobin 14.1      Hematocrit 42.1      MCV 92.1      MCH 30.9      MCHC 33.5      RDW 12.9      Platelets 162      MPV 11.9      Differential Type Auto      nRBC 0.0      Immature Granulocytes 0.4      Neutrophils 75.4      Lymphocytes 15.9      Monocytes 6.7      Eosinophils 1.2      Basophils 0.4      Immature Granulocytes, Absolute 0.04      Neutrophils, Absolute 7.25 (*)     Lymphocytes, Absolute 1.53      Monocytes, Absolute 0.64      Eosinophils, Absolute 0.12      Basophils, Absolute 0.04     COMPREHENSIVE METABOLIC PANEL - Abnormal    Sodium 136      Potassium 3.6      Chloride 103      CO2 23      BUN 12      Creatinine 0.8      Glucose 164 (*)     Calcium 8.7 (*)     AST (SGOT) 210 (*)     ALT (SGPT) 353 (*)     Alkaline Phosphatase 102      Total Protein 7.9      Albumin 4.3      Bilirubin, Total 1.8 (*)     eGFR >60.0      Anion Gap 10     UA REFLEX CULTURE (MACROSCOPIC) - Abnormal    Color, Urine Yellow       Clarity, Urine Cloudy (*)     Specific Gravity, Urine 1.012      pH, Urine 7.0      Leukocyte Esterase Negative      Nitrite, Urine Negative      Protein, Urine Trace (*)     Glucose, Urine Negative      Ketones, Urine Negative      Urobilinogen, Urine 1.0      Bilirubin, Urine Negative      Blood, Urine Trace (*)    LIPASE - Abnormal    Lipase 1,650 (*)     Narrative:     Checked by dilution     BILIRUBIN, DIRECT - Abnormal    Bilirubin, Direct 0.8 (*)    UA MICROSCOPIC - Normal    RBC, Urine 0 TO 4      WBC, Urine 0 TO 3      Squamous Epithelial None Seen      Hyaline Cast None Seen      Bacteria, Urine None Seen     SARS-COV-2 (COVID 19), PCR   URINALYSIS REFLEX CULTURE (ED AND OUTPATIENT ONLY)    Narrative:     The following orders were created for panel order Urinalysis w/ reflex culture.  Procedure                               Abnormality         Status                     ---------                               -----------         ------                     UA Reflex to Culture (Ma...[[id number]]  Abnormal            Final result               UA Microscopic[[id number]]               Normal              Final result                 Please view results for these tests on the individual orders.       CT ABDOMEN PELVIS WITH IV CONTRAST   Final Result   IMPRESSION:      Cholelithiasis with intrahepatic and extra hepatic biliary dilatation.   Recommend further evaluation with MRI/MRCP to assess for choledocholithiasis.      Please see above comments for incidental/additional findings.      ULTRASOUND GALLBLADDER   Final Result   IMPRESSION:      Cholelithiasis with dilatation of the common bile duct as well as intrahepatic   biliary tree.  Underlying choledocholithiasis is of concern, recommend MRI/MRCP.      Hepatic steatosis.      MRI ABDOMEN WITH AND WITHOUT CONTRAST MRCP    (Results Pending)               MDM         ED Course as of May 20 2027   Wed May 20, 2020   1616 WBC: 9.62 [CL]   1616 AST (SGOT)(!):  210 [CL]   1616 ALT (SGPT)(!): 353 [CL]   1616 Alkaline Phosphatase: 102 [CL]   1616 Bilirubin, Total(!): 1.8 [CL]   1753 Call out to gen surg    [CL]   1802 Lipase(!): 1,650 [CL]   1838 2nd call to gen surg     [CL]   1851 Dr. Ivory to admit patient     [CL]   1853 Patient in agreement with plan for admission    [CL]      ED Course User Index  [CL] Ida Julio PA C         Clinical Impressions as of May 20 2027   Calculus of gallbladder and bile duct with obstruction without cholecystitis   Acute biliary pancreatitis, unspecified complication status        Ida Julio PA C  05/20/20 2027

## 2020-05-21 ENCOUNTER — ANESTHESIA (INPATIENT)
Dept: OPERATING ROOM | Facility: HOSPITAL | Age: 65
DRG: 417 | End: 2020-05-21
Payer: COMMERCIAL

## 2020-05-21 ENCOUNTER — ANESTHESIA EVENT (INPATIENT)
Dept: OPERATING ROOM | Facility: HOSPITAL | Age: 65
DRG: 417 | End: 2020-05-21
Payer: COMMERCIAL

## 2020-05-21 PROBLEM — E03.8 OTHER SPECIFIED HYPOTHYROIDISM: Status: ACTIVE | Noted: 2018-04-23

## 2020-05-21 PROBLEM — K85.90 PANCREATITIS, ACUTE: Status: ACTIVE | Noted: 2020-05-21

## 2020-05-21 LAB
ABO + RH BLD: NORMAL
ALBUMIN SERPL-MCNC: 3.9 G/DL (ref 3.4–5)
ALP SERPL-CCNC: 105 IU/L (ref 35–126)
ALT SERPL-CCNC: 290 IU/L (ref 16–63)
ANION GAP SERPL CALC-SCNC: 14 MEQ/L (ref 3–15)
APTT PPP: 30 SEC (ref 23–35)
AST SERPL-CCNC: 130 IU/L (ref 15–41)
ATRIAL RATE: 67
BILIRUB DIRECT SERPL-MCNC: 0.2 MG/DL
BILIRUB SERPL-MCNC: 1 MG/DL (ref 0.3–1.2)
BLD GP AB SCN SERPL QL: NEGATIVE
BUN SERPL-MCNC: 14 MG/DL (ref 8–20)
CALCIUM SERPL-MCNC: 8.9 MG/DL (ref 8.9–10.3)
CHLORIDE SERPL-SCNC: 103 MEQ/L (ref 98–109)
CO2 SERPL-SCNC: 26 MEQ/L (ref 22–32)
CREAT SERPL-MCNC: 0.9 MG/DL (ref 0.8–1.3)
D AG BLD QL: POSITIVE
ERYTHROCYTE [DISTWIDTH] IN BLOOD BY AUTOMATED COUNT: 12.6 % (ref 11.6–14.4)
GFR SERPL CREATININE-BSD FRML MDRD: >60 ML/MIN/1.73M*2
GLUCOSE BLD-MCNC: 109 MG/DL (ref 70–99)
GLUCOSE BLD-MCNC: 148 MG/DL (ref 70–99)
GLUCOSE BLD-MCNC: 247 MG/DL (ref 70–99)
GLUCOSE SERPL-MCNC: 122 MG/DL (ref 70–99)
HCT VFR BLDCO AUTO: 39.6 % (ref 40.1–51)
HGB BLD-MCNC: 13.4 G/DL (ref 13.7–17.5)
INR PPP: 1 INR
LABORATORY COMMENT REPORT: NORMAL
LIPASE SERPL-CCNC: 186 U/L (ref 20–51)
MCH RBC QN AUTO: 30.5 PG (ref 28–33.2)
MCHC RBC AUTO-ENTMCNC: 33.8 G/DL (ref 32.2–36.5)
MCV RBC AUTO: 90 FL (ref 83–98)
P AXIS: 49
PDW BLD AUTO: 12.2 FL (ref 9.4–12.4)
PLATELET # BLD AUTO: 151 K/UL (ref 150–350)
POCT TEST: ABNORMAL
POTASSIUM SERPL-SCNC: 3.7 MEQ/L (ref 3.6–5.1)
PR INTERVAL: 166
PROT SERPL-MCNC: 7.3 G/DL (ref 6–8.2)
PROTHROMBIN TIME: 13.4 SEC (ref 12.2–14.5)
QRS DURATION: 90
QT INTERVAL: 406
QTC CALCULATION(BAZETT): 429
R AXIS: -5
RBC # BLD AUTO: 4.4 M/UL (ref 4.5–5.8)
SODIUM SERPL-SCNC: 143 MEQ/L (ref 136–144)
T WAVE AXIS: 21
VENTRICULAR RATE: 67
WBC # BLD AUTO: 7.95 K/UL (ref 3.8–10.5)

## 2020-05-21 PROCEDURE — 25000000 HC PHARMACY GENERAL: Performed by: NURSE ANESTHETIST, CERTIFIED REGISTERED

## 2020-05-21 PROCEDURE — 86850 RBC ANTIBODY SCREEN: CPT

## 2020-05-21 PROCEDURE — 85610 PROTHROMBIN TIME: CPT | Performed by: SURGERY

## 2020-05-21 PROCEDURE — 63600000 HC DRUGS/DETAIL CODE: Performed by: NURSE ANESTHETIST, CERTIFIED REGISTERED

## 2020-05-21 PROCEDURE — 99223 1ST HOSP IP/OBS HIGH 75: CPT | Performed by: HOSPITALIST

## 2020-05-21 PROCEDURE — 88304 TISSUE EXAM BY PATHOLOGIST: CPT | Performed by: SURGERY

## 2020-05-21 PROCEDURE — 82248 BILIRUBIN DIRECT: CPT | Performed by: SURGERY

## 2020-05-21 PROCEDURE — 36000014 HC OR LEVEL 4 EA ADDL MIN: Performed by: SURGERY

## 2020-05-21 PROCEDURE — 71000001 HC PACU PHASE 1 INITIAL 30MIN: Performed by: SURGERY

## 2020-05-21 PROCEDURE — 71000011 HC PACU PHASE 1 EA ADDL MIN: Performed by: SURGERY

## 2020-05-21 PROCEDURE — 0FT44ZZ RESECTION OF GALLBLADDER, PERCUTANEOUS ENDOSCOPIC APPROACH: ICD-10-PCS | Performed by: SURGERY

## 2020-05-21 PROCEDURE — 36415 COLL VENOUS BLD VENIPUNCTURE: CPT | Performed by: SURGERY

## 2020-05-21 PROCEDURE — 37000001 HC ANESTHESIA GENERAL: Performed by: SURGERY

## 2020-05-21 PROCEDURE — 25000000 HC PHARMACY GENERAL: Performed by: SURGERY

## 2020-05-21 PROCEDURE — 80048 BASIC METABOLIC PNL TOTAL CA: CPT | Performed by: SURGERY

## 2020-05-21 PROCEDURE — 63700000 HC SELF-ADMINISTRABLE DRUG: Performed by: SURGERY

## 2020-05-21 PROCEDURE — 63600000 HC DRUGS/DETAIL CODE: Performed by: SURGERY

## 2020-05-21 PROCEDURE — 12000000 HC ROOM AND CARE MED/SURG

## 2020-05-21 PROCEDURE — 85027 COMPLETE CBC AUTOMATED: CPT | Performed by: SURGERY

## 2020-05-21 PROCEDURE — 99222 1ST HOSP IP/OBS MODERATE 55: CPT | Mod: 57 | Performed by: SURGERY

## 2020-05-21 PROCEDURE — 47562 LAPAROSCOPIC CHOLECYSTECTOMY: CPT | Performed by: SURGERY

## 2020-05-21 PROCEDURE — 200200 PR NO CHARGE: Performed by: INTERNAL MEDICINE

## 2020-05-21 PROCEDURE — 36000004 HC OR LEVEL 4 INITIAL 30MIN: Performed by: SURGERY

## 2020-05-21 PROCEDURE — 83690 ASSAY OF LIPASE: CPT | Performed by: SURGERY

## 2020-05-21 PROCEDURE — 27200000 HC STERILE SUPPLY: Performed by: SURGERY

## 2020-05-21 PROCEDURE — 85730 THROMBOPLASTIN TIME PARTIAL: CPT | Performed by: SURGERY

## 2020-05-21 RX ORDER — AMLODIPINE BESYLATE 10 MG/1
10 TABLET ORAL
Status: DISCONTINUED | OUTPATIENT
Start: 2020-05-21 | End: 2020-05-22 | Stop reason: HOSPADM

## 2020-05-21 RX ORDER — EPHEDRINE SULFATE 50 MG/ML
INJECTION, SOLUTION INTRAVENOUS AS NEEDED
Status: DISCONTINUED | OUTPATIENT
Start: 2020-05-21 | End: 2020-05-21 | Stop reason: SURG

## 2020-05-21 RX ORDER — ONDANSETRON HYDROCHLORIDE 2 MG/ML
INJECTION, SOLUTION INTRAVENOUS AS NEEDED
Status: DISCONTINUED | OUTPATIENT
Start: 2020-05-21 | End: 2020-05-21 | Stop reason: SURG

## 2020-05-21 RX ORDER — ROCURONIUM BROMIDE 10 MG/ML
INJECTION, SOLUTION INTRAVENOUS AS NEEDED
Status: DISCONTINUED | OUTPATIENT
Start: 2020-05-21 | End: 2020-05-21 | Stop reason: SURG

## 2020-05-21 RX ORDER — LEVOTHYROXINE SODIUM 125 UG/1
125 TABLET ORAL
Status: DISCONTINUED | OUTPATIENT
Start: 2020-05-21 | End: 2020-05-22 | Stop reason: HOSPADM

## 2020-05-21 RX ORDER — LABETALOL HCL 20 MG/4 ML
SYRINGE (ML) INTRAVENOUS AS NEEDED
Status: DISCONTINUED | OUTPATIENT
Start: 2020-05-21 | End: 2020-05-21 | Stop reason: SURG

## 2020-05-21 RX ORDER — DEXTROSE 50 % IN WATER (D50W) INTRAVENOUS SYRINGE
25 AS NEEDED
Status: DISCONTINUED | OUTPATIENT
Start: 2020-05-21 | End: 2020-05-21 | Stop reason: HOSPADM

## 2020-05-21 RX ORDER — FENTANYL CITRATE 50 UG/ML
50 INJECTION, SOLUTION INTRAMUSCULAR; INTRAVENOUS
Status: DISCONTINUED | OUTPATIENT
Start: 2020-05-21 | End: 2020-05-21 | Stop reason: HOSPADM

## 2020-05-21 RX ORDER — HYDROMORPHONE HYDROCHLORIDE 1 MG/ML
INJECTION, SOLUTION INTRAMUSCULAR; INTRAVENOUS; SUBCUTANEOUS AS NEEDED
Status: DISCONTINUED | OUTPATIENT
Start: 2020-05-21 | End: 2020-05-21 | Stop reason: SURG

## 2020-05-21 RX ORDER — IBUPROFEN 200 MG
16-32 TABLET ORAL AS NEEDED
Status: DISCONTINUED | OUTPATIENT
Start: 2020-05-21 | End: 2020-05-21 | Stop reason: HOSPADM

## 2020-05-21 RX ORDER — MIDAZOLAM HYDROCHLORIDE 2 MG/2ML
INJECTION, SOLUTION INTRAMUSCULAR; INTRAVENOUS AS NEEDED
Status: DISCONTINUED | OUTPATIENT
Start: 2020-05-21 | End: 2020-05-21 | Stop reason: SURG

## 2020-05-21 RX ORDER — SODIUM CHLORIDE, SODIUM LACTATE, POTASSIUM CHLORIDE, CALCIUM CHLORIDE 600; 310; 30; 20 MG/100ML; MG/100ML; MG/100ML; MG/100ML
INJECTION, SOLUTION INTRAVENOUS CONTINUOUS
Status: ACTIVE | OUTPATIENT
Start: 2020-05-21 | End: 2020-05-21

## 2020-05-21 RX ORDER — LIDOCAINE HCL/PF 100 MG/5ML
SYRINGE (ML) INTRAVENOUS AS NEEDED
Status: DISCONTINUED | OUTPATIENT
Start: 2020-05-21 | End: 2020-05-21 | Stop reason: SURG

## 2020-05-21 RX ORDER — KETOROLAC TROMETHAMINE 30 MG/ML
INJECTION, SOLUTION INTRAMUSCULAR; INTRAVENOUS AS NEEDED
Status: DISCONTINUED | OUTPATIENT
Start: 2020-05-21 | End: 2020-05-21 | Stop reason: SURG

## 2020-05-21 RX ORDER — HYDROMORPHONE HYDROCHLORIDE 1 MG/ML
0.5 INJECTION, SOLUTION INTRAMUSCULAR; INTRAVENOUS; SUBCUTANEOUS
Status: DISCONTINUED | OUTPATIENT
Start: 2020-05-21 | End: 2020-05-21 | Stop reason: HOSPADM

## 2020-05-21 RX ORDER — INSULIN ASPART 100 [IU]/ML
3-5 INJECTION, SOLUTION INTRAVENOUS; SUBCUTANEOUS EVERY 6 HOURS
Status: DISCONTINUED | OUTPATIENT
Start: 2020-05-21 | End: 2020-05-22

## 2020-05-21 RX ORDER — DEXAMETHASONE SODIUM PHOSPHATE 4 MG/ML
INJECTION, SOLUTION INTRA-ARTICULAR; INTRALESIONAL; INTRAMUSCULAR; INTRAVENOUS; SOFT TISSUE AS NEEDED
Status: DISCONTINUED | OUTPATIENT
Start: 2020-05-21 | End: 2020-05-21 | Stop reason: SURG

## 2020-05-21 RX ORDER — DEXTROSE 40 %
15-30 GEL (GRAM) ORAL AS NEEDED
Status: DISCONTINUED | OUTPATIENT
Start: 2020-05-21 | End: 2020-05-21 | Stop reason: HOSPADM

## 2020-05-21 RX ORDER — BUPIVACAINE HCL/EPINEPHRINE 0.5-1:200K
VIAL (ML) INJECTION AS NEEDED
Status: DISCONTINUED | OUTPATIENT
Start: 2020-05-21 | End: 2020-05-21 | Stop reason: HOSPADM

## 2020-05-21 RX ORDER — FENTANYL CITRATE 50 UG/ML
INJECTION, SOLUTION INTRAMUSCULAR; INTRAVENOUS AS NEEDED
Status: DISCONTINUED | OUTPATIENT
Start: 2020-05-21 | End: 2020-05-21 | Stop reason: SURG

## 2020-05-21 RX ORDER — MEPERIDINE HYDROCHLORIDE 25 MG/ML
12.5 INJECTION INTRAMUSCULAR; INTRAVENOUS; SUBCUTANEOUS EVERY 10 MIN PRN
Status: DISCONTINUED | OUTPATIENT
Start: 2020-05-21 | End: 2020-05-21 | Stop reason: HOSPADM

## 2020-05-21 RX ORDER — PROPOFOL 10 MG/ML
INJECTION, EMULSION INTRAVENOUS AS NEEDED
Status: DISCONTINUED | OUTPATIENT
Start: 2020-05-21 | End: 2020-05-21 | Stop reason: SURG

## 2020-05-21 RX ORDER — ONDANSETRON HYDROCHLORIDE 2 MG/ML
4 INJECTION, SOLUTION INTRAVENOUS
Status: DISCONTINUED | OUTPATIENT
Start: 2020-05-21 | End: 2020-05-21 | Stop reason: HOSPADM

## 2020-05-21 RX ADMIN — LABETALOL HYDROCHLORIDE 5 MG: 5 INJECTION, SOLUTION INTRAVENOUS at 14:47

## 2020-05-21 RX ADMIN — DEXAMETHASONE SODIUM PHOSPHATE 8 MG: 4 INJECTION, SOLUTION INTRA-ARTICULAR; INTRALESIONAL; INTRAMUSCULAR; INTRAVENOUS; SOFT TISSUE at 14:29

## 2020-05-21 RX ADMIN — ROCURONIUM BROMIDE 20 MG: 10 INJECTION, SOLUTION INTRAVENOUS at 14:33

## 2020-05-21 RX ADMIN — FENTANYL CITRATE 50 MCG: 50 INJECTION INTRAMUSCULAR; INTRAVENOUS at 14:56

## 2020-05-21 RX ADMIN — METRONIDAZOLE 500 MG: 500 INJECTION, SOLUTION INTRAVENOUS at 04:00

## 2020-05-21 RX ADMIN — MIDAZOLAM HYDROCHLORIDE 2 MG: 1 INJECTION, SOLUTION INTRAMUSCULAR; INTRAVENOUS at 14:02

## 2020-05-21 RX ADMIN — SUGAMMADEX 400 MG: 100 INJECTION, SOLUTION INTRAVENOUS at 15:13

## 2020-05-21 RX ADMIN — Medication 1 MG: at 14:33

## 2020-05-21 RX ADMIN — LIDOCAINE HYDROCHLORIDE 5 ML: 20 INJECTION, SOLUTION INTRAVENOUS at 14:12

## 2020-05-21 RX ADMIN — SODIUM CHLORIDE, POTASSIUM CHLORIDE, SODIUM LACTATE AND CALCIUM CHLORIDE: 600; 310; 30; 20 INJECTION, SOLUTION INTRAVENOUS at 10:13

## 2020-05-21 RX ADMIN — KETOROLAC TROMETHAMINE 30 MG: 30 INJECTION, SOLUTION INTRAMUSCULAR at 15:41

## 2020-05-21 RX ADMIN — PROPOFOL 200 MG: 10 INJECTION, EMULSION INTRAVENOUS at 14:12

## 2020-05-21 RX ADMIN — FENTANYL CITRATE 150 MCG: 50 INJECTION INTRAMUSCULAR; INTRAVENOUS at 14:12

## 2020-05-21 RX ADMIN — DOCUSATE SODIUM 100 MG: 100 CAPSULE, LIQUID FILLED ORAL at 19:39

## 2020-05-21 RX ADMIN — ROCURONIUM BROMIDE 40 MG: 10 INJECTION, SOLUTION INTRAVENOUS at 14:12

## 2020-05-21 RX ADMIN — LABETALOL HYDROCHLORIDE 5 MG: 5 INJECTION, SOLUTION INTRAVENOUS at 14:53

## 2020-05-21 RX ADMIN — MIDAZOLAM HYDROCHLORIDE 150 MG: 1 INJECTION, SOLUTION INTRAMUSCULAR; INTRAVENOUS at 14:12

## 2020-05-21 RX ADMIN — EPHEDRINE SULFATE 10 MG: 50 INJECTION, SOLUTION INTRAVENOUS at 14:20

## 2020-05-21 RX ADMIN — SODIUM CHLORIDE, POTASSIUM CHLORIDE, SODIUM LACTATE AND CALCIUM CHLORIDE: 600; 310; 30; 20 INJECTION, SOLUTION INTRAVENOUS at 16:51

## 2020-05-21 RX ADMIN — ONDANSETRON 4 MG: 2 INJECTION INTRAMUSCULAR; INTRAVENOUS at 14:29

## 2020-05-21 RX ADMIN — METRONIDAZOLE 500 MG: 500 INJECTION, SOLUTION INTRAVENOUS at 12:28

## 2020-05-21 RX ADMIN — CEFTRIAXONE SODIUM 1 G: 1 INJECTION, POWDER, FOR SOLUTION INTRAVENOUS at 20:58

## 2020-05-21 RX ADMIN — KETOROLAC TROMETHAMINE 15 MG: 30 INJECTION, SOLUTION INTRAMUSCULAR at 14:56

## 2020-05-21 RX ADMIN — ROCURONIUM BROMIDE 10 MG: 10 INJECTION, SOLUTION INTRAVENOUS at 14:20

## 2020-05-21 RX ADMIN — FENTANYL CITRATE 100 MCG: 50 INJECTION INTRAMUSCULAR; INTRAVENOUS at 14:29

## 2020-05-21 RX ADMIN — METRONIDAZOLE 500 MG: 500 INJECTION, SOLUTION INTRAVENOUS at 19:39

## 2020-05-21 RX ADMIN — FENTANYL CITRATE 50 MCG: 50 INJECTION INTRAMUSCULAR; INTRAVENOUS at 15:01

## 2020-05-21 ASSESSMENT — PAIN SCALES - GENERAL: PAIN_LEVEL: 1

## 2020-05-21 NOTE — CONSULTS
"    GASTROENTEROLOGY CONSULTATION   [facility]     PATIENT NAME:  Rhys Campos        YOB: 1955   AGE:  64 y.o.         MRN:  [id number]              HISTORY   64 with hx as below admitted with ab pain/abnl LFT's. Pt reports hx of intermittent right sided ab pain through the yrs that would seem to resolve with single vomiting episode. USOH until 2 days ago when developed right sided pain that persisted- came to ER where LFT\"s and lipase elevated. Mildly dilated CBD on CT for which MRCP was pursued and was neg for CBD stone but suggested pancreatitis.  Pt reports he is feeling better today. No pain or fever. LFT's and lipase improving. No EtOH    PAST MEDICAL HISTORY     Past Medical History:   Diagnosis Date   • BPH (benign prostatic hyperplasia)    • Graves disease    • Hypertension    • Lipid disorder    • Male erectile dysfunction    • Type 2 diabetes mellitus (CMS/HCC)        PAST SURGICAL HISTORY     Past Surgical History:   Procedure Laterality Date   • COLONOSCOPY     • EYE SURGERY Bilateral     from Graves   • SINUS SURGERY          FAMILY HISTORY     Family History   Problem Relation Age of Onset   • Other Biological Mother         natural causes -  at age 75   • Hypertension Biological Father    • Other Biological Father         natural causes -  at age 78   • No Known Problems Biological Sister      No significant Neurological History    SOCIAL HISTORY     Social History     Tobacco Use   • Smoking status: Current Every Day Smoker     Packs/day: 0.75     Years: 40.00     Pack years: 30.00     Types: Cigarettes     Start date:    • Smokeless tobacco: Current User   Substance Use Topics   • Alcohol use: No       MEDICATIONS   Home Medication:  •  amLODIPine, take 1 tablet by mouth once daily  •  levothyroxine, take 1 tablet by mouth once daily  •  atorvastatin, take 1 tablet by mouth once daily (Patient not taking: Reported on 2020)  •  lisinopriL-hydrochlorothiazide, take 1 " "tablet by mouth once daily  •  metFORMIN, take 1 tablet by mouth AT NIGHT as directed (Patient not taking: Reported on 2020)    MEDICATIONS:  Infusions:    • lactated ringer's   100 mL/hr at 20 1013          Scheduled:   • amLODIPine  10 mg oral Daily (6a)   • cefTRIAXone  1 g intravenous q24h INT   • docusate sodium  100 mg oral BID   • heparin (porcine)  5,000 Units subcutaneous q8h VIJAY   • insulin aspart U-100  3-5 Units subcutaneous q6h VIJAY   • levothyroxine  125 mcg oral Daily (6a)   • metroNIDAZOLE  500 mg intravenous q8h INT       ALLERGIES     Allergies   Allergen Reactions   • No Known Allergies        REVIEW OF SYSTEMS   13 point review of systems completed. Negative except for above mentioned history.    PHYSICAL EXAMINATION   Temperature: Temp (24hrs), Av.6 °C (97.9 °F), Min:36.4 °C (97.5 °F), Max:36.9 °C (98.4 °F)      /59 Pulse: 100 Respirations: 18  Height: 1.753 m (5' 9\") Weight: 84 kg (185 lb 3.2 oz) Body mass index is 27.35 kg/m².     General appearance: no distress  Head: normocephalic  Eyes:  Anicteric  ENT:  Oral mucosa moist  Lungs: clear to auscultation anteriorly   Heart: regular rate   Abdomen: mild epigas discomfort, no murphys  Rectal:   Extremities: no edema  Skin:  No rashes or lesions  Neurologic: awake and alert and oriented  Pscyh:  cooperative     Diagnostic Data:     Labs reviewed-  Lab Results   Component Value Date    WBC 7.95 2020    HGB 13.4 (L) 2020    HCT 39.6 (L) 2020     2020    CHOL 147 2020    TRIG 66 2020    HDL 35 (L) 2020     (H) 2020     (H) 2020     2020    K 3.7 2020     2020    CREATININE 0.9 2020    BUN 14 2020    CO2 26 2020    TSH 4.12 2020    PSA 2.92 2019    INR 1.0 2020    HGBA1C 6.5 (H) 2020    MICROALBUR 2.7 2020     Imaging reviewed  Ct Abdomen Pelvis With Iv Contrast    Result Date: " 5/20/2020  IMPRESSION: Cholelithiasis with intrahepatic and extra hepatic biliary dilatation. Recommend further evaluation with MRI/MRCP to assess for choledocholithiasis. Please see above comments for incidental/additional findings.    Ultrasound Gallbladder    Result Date: 5/20/2020  IMPRESSION: Cholelithiasis with dilatation of the common bile duct as well as intrahepatic biliary tree.  Underlying choledocholithiasis is of concern, recommend MRI/MRCP. Hepatic steatosis.    Mri Abdomen With And Without Contrast Mrcp    Result Date: 5/20/2020  IMPRESSION: 1.  Mild biliary ductal dilatation with no evidence of choledocholithiasis. 2.  MRI findings suspicious for acute interstitial edematous pancreatitis. 3.  Extensive cholelithiasis with questionable pericholecystic edema.  This raises the possibility of a mild underlying cholecystitis. 4.  Image quality is slightly degraded by motion artifact on MRCP. 5.  Mild ectasia of the abdominal aorta.  Continued imaging follow-up recommended. COMMENT: Comparison: 5/20/2020 CT and ultrasound studies.. TECHNIQUE: MRI abdomen performed without and with intravenous contrast.  8.4 mL Gadavist given. Three-dimensional MRCP was performed using maximum intensity projection reconstruction on the same workstation. FINDINGS: Liver: No hepatic steatosis.  Normal hepatic surface contours.  No evidence of hepatic mass.  No restricted diffusion. Ascites:  none Intrahepatic and extrahepatic bile ducts: Redemonstration of mild biliary ductal dilatation.  However, there is no evidence of choledocholithiasis.  MRCP is slightly degraded by motion artifact.  Common bile duct measures up to about 0.8 cm. Gallbladder: Diffuse cholelithiasis.  Gallbladder wall remains normal in caliber.  Possible small amounts of pericholecystic edema on some of the T2 series. Pancreas: Peripancreatic edema and small amounts intrapancreatic edema suspected (worrisome for acute pancreatitis.  Pancreatic duct is not  dilated.  There is is no evidence of pancreatic divisum.  Pancreas demonstrates normal homogenous enhancement with no evidence of pancreatic necrosis.  No restricted pancreatic diffusion.  No evidence of pancreatic mass. Spleen: Within normal limits. Adrenal glands: Within normal limits. Kidneys: Small renal cysts. Bowel: No dilated loops or evidence of obstruction. Lymph nodes: Normal size. Abdominal Vessels: Ectasia of the abdominal aorta measuring 2.7 cm.  There are atherosclerotic changes present.  Patency of major vasculature. Bones: No suspicious enhancement or marrow replacing lesion.  There are degenerative changes. Lung bases: Minimal hypoventilatory changes suspected.  Otherwise grossly unremarkable MRI. Imaged abdominal and chest wall:  Within normal limits.        ASSESSMENT/PLAN   No new Assessment & Plan notes have been filed under this hospital service since the last note was generated.  Service: Gastroenterology  gallstone pancreatitis  Improving  MRCP neg for retained stone  IVF  replete lytes PRN  For possible mai today  No further recs. Will sign off. Please call with questions or clinical change    AUTHOR:  Clint Hargrove MD  5/21/2020

## 2020-05-21 NOTE — PROGRESS NOTES
Hospital Medicine Service -  Daily Progress Note       SUBJECTIVE   Interval History: Patient seen laying in bed. Patient note some mild RUQ pain. Denies N/V/D, fevers or chills.      OBJECTIVE      Vital signs in last 24 hours:  Temp:  [36.4 °C (97.5 °F)-36.9 °C (98.4 °F)] 36.4 °C (97.5 °F)  Heart Rate:  [] 100  Resp:  [15-20] 18  BP: (139-164)/(59-89) 139/59    Intake/Output Summary (Last 24 hours) at 5/21/2020 1045  Last data filed at 5/21/2020 0600  Gross per 24 hour   Intake 500 ml   Output 2750 ml   Net -2250 ml       PHYSICAL EXAMINATION      General: well appearing, NAD  HEENT: MMM, PERRL, anicteric sclera   Neck: no JVD  Cardiac: RRR, +S1/S2, no m/r/g  Lungs: clear bilaterally, no wheezing  Abdomen: + mild RUQ TTP, no rebound or guarding  Extremities: no edema, clubbing, cyanosis   Neuro: AAOx3, nonfocal  Skin: warm, well perfused  Psych: cooperative, appropriate   LABS / IMAGING / TELE      Labs  AST//290    Imaging  MRCP reviewed by me:  IMPRESSION:  1.  Mild biliary ductal dilatation with no evidence of choledocholithiasis.  2.  MRI findings suspicious for acute interstitial edematous pancreatitis.  3.  Extensive cholelithiasis with questionable pericholecystic edema.  This  raises the possibility of a mild underlying cholecystitis.  4.  Image quality is slightly degraded by motion artifact on MRCP.  5.  Mild ectasia of the abdominal aorta.  Continued imaging follow-up  recommended.    ECG/Telemetry  N/A    ASSESSMENT AND PLAN      * Pancreatitis, acute  Assessment & Plan  - patient presented with RUQ/epigastric pain  - MRCP showed: mild biliary ductal dilatation with no evidence of choledocholithiasis; suspicious for acute interstitial edematous pancreatitis; extensive cholelithiasis with questionable pericholecystic edema; raises the possibility of a mild underlying cholecystitis.  - Lipase elevated at 1650 on admission  - NPO, IVF, pain control  - monitor LFTs and BMP  - appreciate  surgery and GI recs    Cholelithiasis  Assessment & Plan  - patient with cholelithiasis on imaging and could not rule out cholecystitis  - started on rocephin, flagyl  - NPO, IVF  - appreciate surgery and GI recs    Thrombocytopenia (CMS/HCC)  Assessment & Plan  - Listed as a chronic condition in the PCP notes  - Plts 151  - monitor CBC      Diet-controlled diabetes mellitus (CMS/HCC)  Assessment & Plan  - patient with diet controlled DM no longer on meds  - SSI with accuchecks      Other specified hypothyroidism  Assessment & Plan  - continue Synthroid    Pure hypercholesterolemia  Assessment & Plan  - patient with known HLD no longer on statin    Essential hypertension  Assessment & Plan  - patient with known HTN on amlodipine, [id number]-I  - continue home meds  - monitor BP         VTE Assessment: Padua VTE Score: 4  VTE Prophylaxis Plan: heparin  Code Status: Full Code  Estimated Discharge Date: 5/25/2020     Charlotte Guidry DO  5/21/2020

## 2020-05-21 NOTE — CONSULTS
"   Hospital Medicine Service -  Inpatient Consultation         Requesting Physician: Dr. MARIETTA Ivory - surgery    Reason for Consultation: Medical management     HISTORY OF PRESENT ILLNESS        This is a 64 y.o. male who presented to the ER tonight with c/o having RUQ abdominal pain since the AM on 05/20/20. In the ER, his CT abdomen showed \"Cholelithiasis with intrahepatic and extra hepatic biliary dilatation\". An MRCP was then ordered by surgery and was done in the same night, read as \"Mild biliary ductal dilatation with no evidence of choledocholithiasis. MRI findings suspicious for acute interstitial edematous pancreatitis.Extensive cholelithiasis with questionable pericholecystic edema.  This raises the possibility of a mild underlying cholecystitis\". Lipase is high at 1650.    During my interview, Mr. Campos is AAO x 3, able to make good conversation and looking in NAD while at rest. He denies any nausea, vomiting, loose BM, fever or chest pain.    PAST MEDICAL AND SURGICAL HISTORY        Past Medical History:   Diagnosis Date   • BPH (benign prostatic hyperplasia)    • Graves disease    • Hypertension    • Lipid disorder    • Male erectile dysfunction    • Type 2 diabetes mellitus (CMS/HCC)        Past Surgical History:   Procedure Laterality Date   • COLONOSCOPY     • EYE SURGERY Bilateral     from Graves   • SINUS SURGERY         PCP: Clint Coker Jr., DO    MEDICATIONS        Home Medications:  Medications Prior to Admission   Medication Sig Dispense Refill Last Dose   • amLODIPine (NORVASC) 10 mg tablet take 1 tablet by mouth once daily 90 tablet 1    • levothyroxine (SYNTHROID) 125 mcg tablet take 1 tablet by mouth once daily 90 tablet 3    • atorvastatin (LIPITOR) 10 mg tablet take 1 tablet by mouth once daily (Patient not taking: Reported on 5/20/2020) 30 tablet 0 Not Taking at Unknown time   • lisinopriL-hydrochlorothiazide (PRINZIDE,ZESTORETIC) 20-12.5 mg per tablet take 1 tablet by mouth " once daily 90 tablet 0    • metFORMIN (GLUCOPHAGE) 500 mg tablet take 1 tablet by mouth AT NIGHT as directed (Patient not taking: Reported on 2020) 90 tablet 3 Not Taking at Unknown time       Current inpatient medications were personally reviewed.    ALLERGIES        No known allergies    FAMILY HISTORY        Family History   Problem Relation Age of Onset   • Other Biological Mother         natural causes -  at age 75   • Hypertension Biological Father    • Other Biological Father         natural causes -  at age 78   • No Known Problems Biological Sister        SOCIAL HISTORY        Social History     Socioeconomic History   • Marital status:      Spouse name: None   • Number of children: None   • Years of education: None   • Highest education level: None   Occupational History   • Occupation: Maintenance     Comment: [employer]   Social Needs   • Financial resource strain: None   • Food insecurity:     Worry: None     Inability: None   • Transportation needs:     Medical: None     Non-medical: None   Tobacco Use   • Smoking status: Current Every Day Smoker     Packs/day: 0.75     Years: 40.00     Pack years: 30.00     Types: Cigarettes     Start date:    • Smokeless tobacco: Current User   Substance and Sexual Activity   • Alcohol use: No   • Drug use: No   • Sexual activity: Defer   Lifestyle   • Physical activity:     Days per week: None     Minutes per session: None   • Stress: None   Relationships   • Social connections:     Talks on phone: None     Gets together: None     Attends Rastafarian service: None     Active member of club or organization: None     Attends meetings of clubs or organizations: None     Relationship status: None   • Intimate partner violence:     Fear of current or ex partner: None     Emotionally abused: None     Physically abused: None     Forced sexual activity: None   Other Topics Concern   • None   Social History Narrative    Lives with his wife.  "      REVIEW OF SYSTEMS        All other systems reviewed and negative except as noted in HPI    PHYSICAL EXAMINATION        Visit Vitals  BP (!) 143/77   Pulse (!) 57   Temp 36.4 °C (97.6 °F) (Oral)   Resp 18   Ht 1.753 m (5' 9\")   Wt 84 kg (185 lb 3.2 oz)   SpO2 96%   BMI 27.35 kg/m²     Body mass index is 27.35 kg/m².    Intake/Output Summary (Last 24 hours) at 5/21/2020 0400  Last data filed at 5/20/2020 2321  Gross per 24 hour   Intake 500 ml   Output 2150 ml   Net -1650 ml       Physical Exam     HEENT: NCAT, PERRLA,  EOMI, supple neck, clear oropharynx.  Skin: intact, normal complexion for race.  Chest: good expansion with inspiration B/L, symmetric.  Lungs: clear to auscultation B/L.  CV : S1, S2, RRR, no pathologic sounds.  Abdomen: soft, TTP in the LUQ, not distended, no organomegaly, +BS in all four quadrants.  Neuro: AAO x 3, no gross focal deficit.  Extremities: - ECC, fair ROM in all joints B/L.    LABS / EKG        Labs  I have reviewed the patient's pertinent labs.  Significant abnormals are Lipase 1650, , , ..    ECG/Telemetry  Telemetry shows a SR at 86/min.    Imaging  I have independently reviewed the pertinent imaging from the last 24 hrs.    ASSESSMENT AND RECOMMENDATIONS           Pancreatitis, acute  Assessment & Plan  Described on the MRCP, Lipase is also elevated at 1650.  NPO except meds, IVF, IV pain meds.    Cholelithiasis  Assessment & Plan  CT abdomen done tonight shows \"Cholelithiasis with intrahepatic and extra hepatic biliary dilatation\".  The patient was admitted by the general surgery team.  [facility] is consulted for medical management.    Thrombocytopenia (CMS/HCC)  Assessment & Plan  Listed as a chronic condition in the PCP Dr. DAGOBERTO Grijalva's progress notes.  Plts are 162 tonight.  Will monitor CBC.    Diet-controlled diabetes mellitus (CMS/HCC)  Assessment & Plan  Most recent Hgb A1c was 6.5 less than four months ago.  Monitor BG levels and use ISS for " coverage.      Postablative hypothyroidism  Assessment & Plan  Will check TFTs if not done in the last six months.  Continue Synthroid.    Essential hypertension  Assessment & Plan  Monitor BP levels .  Continue home meds, except for the diuretic.         Thank you for the consult!     Faraz Hernandez MD  5/21/2020

## 2020-05-21 NOTE — ANESTHESIA POSTPROCEDURE EVALUATION
Patient: Rhys Campos    Procedure Summary     Date:  05/21/20 Room / Location:   OR 5 / [facility] OR    Anesthesia Start:  1403 Anesthesia Stop:  1541    Procedure:  CHOLECYSTECTOMY LAPAROSCOPIC (N/A ) Diagnosis:       Cholecystitis      (cholecystitis  K81.9)    Surgeon:  Zak Ivory MD Responsible Provider:  Jorge Casper MD    Anesthesia Type:  general ASA Status:  2 - Emergent          Anesthesia Type: general  PACU Vitals  5/21/2020 1531 - 5/21/2020 1558      5/21/2020  1535 5/21/2020  1538 5/21/2020  1540       BP:  (!) 155/74  --  139/76     Temp:  --  36.9 °C (98.5 °F)  --     Pulse:  73  --  72     Resp:  (!) 26  --  17     SpO2:  99 %  --  98 %             Anesthesia Post Evaluation    Pain score: 1  Pain management: satisfactory to patient  Mode of pain management: IV medication  Patient location during evaluation: PACU  Patient participation: complete - patient participated  Level of consciousness: awake and alert  Cardiovascular status: acceptable  Airway Patency: adequate  Respiratory status: acceptable  Hydration status: stable  Anesthetic complications: no

## 2020-05-21 NOTE — ASSESSMENT & PLAN NOTE
- patient with known HTN on amlodipine, HCTZ-ACE-I  - continue amlodipine, holding HCTZ-ACE-I  - monitor BP

## 2020-05-21 NOTE — H&P
General Surgery History and Physical      HPI     Patient is a 64 y.o. male who presented to the [facility] ER with RUQ/epigastric pain starting yesterday AM. He has a history of gallstones.NO N/V/D. NO CP/SOB. NO fevers/chills. NO jaundice. Workup in ER revealed gallbladder full of stones, dilated CBD and elevated lipase, diagnosed with gallstone pancreatitis.MRCP with no CBD obstruction. Lipase improved overnight. Feels better. No prev abdominal surgery. Surgery was consulted for management .     Medical History:   Past Medical History:   Diagnosis Date   • BPH (benign prostatic hyperplasia)    • Graves disease    • Hypertension    • Lipid disorder    • Male erectile dysfunction    • Type 2 diabetes mellitus (CMS/HCC)        Surgical History:   Past Surgical History:   Procedure Laterality Date   • COLONOSCOPY     • EYE SURGERY Bilateral     from Graves   • SINUS SURGERY         Social History:   Social History     Socioeconomic History   • Marital status:      Spouse name: None   • Number of children: None   • Years of education: None   • Highest education level: None   Occupational History   • Occupation: Maintenance     Comment: [employer]   Social Needs   • Financial resource strain: None   • Food insecurity:     Worry: None     Inability: None   • Transportation needs:     Medical: None     Non-medical: None   Tobacco Use   • Smoking status: Current Every Day Smoker     Packs/day: 0.75     Years: 40.00     Pack years: 30.00     Types: Cigarettes     Start date: 1980   • Smokeless tobacco: Current User   Substance and Sexual Activity   • Alcohol use: No   • Drug use: No   • Sexual activity: Defer   Lifestyle   • Physical activity:     Days per week: None     Minutes per session: None   • Stress: None   Relationships   • Social connections:     Talks on phone: None     Gets together: None     Attends Denominational service: None     Active member of club or organization: None     Attends meetings of clubs  or organizations: None     Relationship status: None   • Intimate partner violence:     Fear of current or ex partner: None     Emotionally abused: None     Physically abused: None     Forced sexual activity: None   Other Topics Concern   • None   Social History Narrative    Lives with his wife.       Family History:   Family History   Problem Relation Age of Onset   • Other Biological Mother         natural causes -  at age 75   • Hypertension Biological Father    • Other Biological Father         natural causes -  at age 78   • No Known Problems Biological Sister        Allergies: No known allergies    Home Medications:  •  amLODIPine, take 1 tablet by mouth once daily  •  levothyroxine, take 1 tablet by mouth once daily  •  atorvastatin, take 1 tablet by mouth once daily (Patient not taking: Reported on 2020)  •  lisinopriL-hydrochlorothiazide, take 1 tablet by mouth once daily  •  metFORMIN, take 1 tablet by mouth AT NIGHT as directed (Patient not taking: Reported on 2020)    Current Medications:  •  acetaminophen, 650 mg, oral, q4h PRN  •  alum-mag hydroxide-simeth, 30 mL, oral, q4h PRN  •  cefTRIAXone, 1 g, intravenous, q24h INT  •  glucose, 16-32 g of dextrose, oral, PRN **OR** dextrose, 15-30 g of dextrose, oral, PRN **OR** glucagon, 1 mg, intramuscular, PRN **OR** dextrose in water, 25 mL, intravenous, PRN  •  diphenhydrAMINE, 25 mg, intravenous, q6h PRN  •  docusate sodium, 100 mg, oral, BID  •  heparin (porcine), 5,000 Units, subcutaneous, q8h VIJAY  •  oxyCODONE, 5 mg, oral, q4h PRN **AND** HYDROmorphone, 0.25 mg, intravenous, q4h PRN  •  lactated ringer's, , intravenous, Continuous  •  magnesium sulfate, 2 g, intravenous, PRN  •  metroNIDAZOLE, 500 mg, intravenous, q8h INT  •  ondansetron, 4 mg, intravenous, q6h PRN  •  potassium chloride, 20 mEq, oral, PRN **OR** potassium chloride, 40 mEq, oral, PRN **OR** potassium chloride, 20 mEq, intravenous, PRN **OR** potassium chloride, 40 mEq,  intravenous, PRN    Review of Systems  Pertinent items are noted in HPI.    Objective     Vital Signs for the last 24 hours:  Temp:  [36.4 °C (97.6 °F)-36.9 °C (98.4 °F)] 36.4 °C (97.6 °F)  Heart Rate:  [57-84] 57  Resp:  [15-20] 18  BP: (143-164)/(77-89) 143/77    Physicial Exam    General appearance: alert, appears stated age and cooperative  Head: normocephalic, without obvious abnormality, atraumatic  Eyes: negative    Neck: supple, symmetrical, trachea midline    Lungs: clear to auscultation bilaterally  Chest wall: no tenderness  Heart: regular rate and rhythm  Abdomen: soft, minimal RUQ TTP, ND. No G/R    Rectal: deferred    Skin: warm and dry    Neurologic: Grossly normal  Psych: Normal mood and affect    Labs  Results from last 7 days   Lab Units 05/21/20  0929 05/20/20  1521   WBC K/uL 7.95 9.62   HEMOGLOBIN g/dL 13.4* 14.1   HEMATOCRIT % 39.6* 42.1   PLATELETS K/uL 151 162   DIFF TYPE   --  Auto   NRBC %  --  0.0   IMM GRANULOCYTES %  --  0.4   NEUTROPHILS %  --  75.4   LYMPHOCYTES %  --  15.9   MONOCYTES %  --  6.7   EOSINOPHILS %  --  1.2   BASOPHILS %  --  0.4   IMM GRANUCOCYTES ABS K/uL  --  0.04   MONO ABS AUTO K/uL  --  0.64   EOS ABS AUTO K/uL  --  0.12   BASO ABS AUTO K/uL  --  0.04       Results from last 7 days   Lab Units 05/21/20  0929 05/20/20  1521   SODIUM mEQ/L 143 136   POTASSIUM mEQ/L 3.7 3.6   CHLORIDE mEQ/L 103 103   CO2 mEQ/L 26 23   BUN mg/dL 14 12   CREATININE mg/dL 0.9 0.8   CALCIUM mg/dL 8.9 8.7*   ALBUMIN g/dL 3.9 4.3   BILIRUBIN TOTAL mg/dL 1.0 1.8*   ALK PHOS IU/L 105 102   ALT IU/L 290* 353*   AST IU/L 130* 210*   GLUCOSE mg/dL 122* 164*         Imaging  CT:  IMPRESSION:     Cholelithiasis with intrahepatic and extra hepatic biliary dilatation.  Recommend further evaluation with MRI/MRCP to assess for choledocholithiasis.     Please see above comments for incidental/additional findings.    US:  IMPRESSION:     Cholelithiasis with dilatation of the common bile duct as well as  intrahepatic  biliary tree.  Underlying choledocholithiasis is of concern, recommend MRI/MRCP.     Hepatic steatosis.      MRI:  IMPRESSION:  1.  Mild biliary ductal dilatation with no evidence of choledocholithiasis.  2.  MRI findings suspicious for acute interstitial edematous pancreatitis.  3.  Extensive cholelithiasis with questionable pericholecystic edema.  This  raises the possibility of a mild underlying cholecystitis.  4.  Image quality is slightly degraded by motion artifact on MRCP.  5.  Mild ectasia of the abdominal aorta.  Continued imaging follow-up  recommended.        Assessment/Plan     Code Status: Full Code      64M w gallstone pancreatitis    NPO  IVF  IV Abx  Trend LFTs, lipase    Assessed by internal medicine and GI    To operative room for lap, poss open mai. Discussed risks, benefits and complications with patient, he agrees to proceed.

## 2020-05-21 NOTE — PATIENT CARE CONFERENCE
Care Progression Rounds Note  Date: 5/21/2020  Time: 11:58 AM     Patient Name: Rhys Campos     Medical Record Number: [id number]   YOB: 1955  Sex: Male      Room/Bed: 4207W    Admitting Diagnosis: Calculus of gallbladder and bile duct with obstruction without cholecystitis [K80.71]  Acute biliary pancreatitis, unspecified complication status [K85.10]   Admit Date/Time: 5/20/2020  3:10 PM    Primary Diagnosis: Pancreatitis, acute  Principal Problem: Pancreatitis, acute    GMLOS: pending  Anticipated Discharge Date: 5/25/2020    AM-PAC  Mobility Score:      Discharge Planning:       Barriers to Discharge:  Barriers to Discharge: Medical issues not resolved  Comment: stat labs, possible OR today, domínguez in place for retention    Participants:  , social work/services, nursing

## 2020-05-21 NOTE — OP NOTE
CHOLECYSTECTOMY LAPAROSCOPIC Procedure Note    Procedure:    CHOLECYSTECTOMY LAPAROSCOPIC  CPT(R) Code:  53371 - CT LAP,CHOLECYSTECTOMY      Pre-op Diagnosis     * Cholecystitis [K81.9]       Post-op Diagnosis     * Cholecystitis [K81.9]    Surgeon(s) and Role:     * Zak Ivory MD - Primary    Anesthesia: General    Staff:   Circulator: Michelle Bruno RN; Felipa Lewis RN  Physician Assistant: MAURICIO Nj PA C  Scrub Person: Rose Koenig    Estimated Blood Loss: 50 mL    Specimens:                Order Name Source Comment Collection Info Order Time   PATHOLOGY TISSUE EXAM Gallbladder Pre-op diagnosis:  cholecystitis  K81.9 Collected By: Zak Ivory MD 5/21/2020  2:38 PM         Drains:   Urethral Catheter 14 Fr (Active)   Reason for Continuing Urinary Catheterization Known or suspected urinary tract obstruction 5/21/2020  9:00 AM   Urine Characteristics yellow 5/21/2020  9:00 AM   Securement Method Securing Device 5/21/2020  9:00 AM   Foreskin uncircumcised: foreskin replaced over glans 5/20/2020  7:32 PM   Tolerance no signs/symptoms of discomfort 5/21/2020  9:00 AM   Output (mL) 600 mL 5/21/2020  6:00 AM   Net Urine Output (mL) 600 mL 5/21/2020  6:00 AM       Implants: * No implants in log *           Complications:  None; patient tolerated the procedure well.           Disposition: PACU - hemodynamically stable.           Condition: stable    TYPE OF REPORT: Operative Report     DATE OF OPERATION:  05/21/2020        PREOPERATIVE DIAGNOSIS: Acute calculous cholecystitis, gallstone pancreatitis     POSTOPERATIVE DIAGNOSIS: Acute calculous cholecystitis, gallstone pancreatitis     SURGERY:  Laparoscopic cholecystectomy     SURGEON:  Dr. Zak Ivory.     ASSISTANT: SABAS Leonard     ANESTHESIA TYPE:  General anesthesia and local anesthesia.     ESTIMATED BLOOD LOSS: 30cc     COMPLICATIONS:  none     DRAINS AND TUBES: none     INDICATIONS FOR THE PROCEDURE:  Christina Rhys Campos  is a 64 y.o. year old male who presented to the [facility] emergency room with complaints of epigastric and right-sided abdominal pain for 1 day. Imaging and clinical exam were consistent with acute calculous cholecystitis, gallstone pancreatitis.  We went over the symptoms and clinical exam that correlated the imaging findings.  We discussed an operative cholecystectomy in great detail including risks, benefits and complications and he showed good understanding and agrees to proceed.       PROCEDURE IN DETAIL:  After correct identification, the patient was laid in the supine position and general anesthesia was induced.  The abdomen was prepped and draped in the usual sterile fashion.  A direct cutdown was performed above the umbilicus to gain access to the peritoneal cavity and a Naren trocar was introduced.  Diagnostic laparoscopy was carried out.  Three further 5 mm trocars were introduced along the right subcostal margin under direct visualization.      Beyond this, we turned our attention to the right upper quadrant.  The gallbladder was grasped with the laparoscopic atraumatic graspers and retracted cephalad to turn attention to the cystic duct region.  The cystic duct was isolated with dissection using a laparoscopic Endo Kitner as well as laparoscopic Bovie hook cautery.  Once a critical view of safety had been obtained, the cystic duct was divided after being clipped with laparoscopic clip appliers.  Cystic artery was addressed in a similar fashion.  The gallbladder was dissected off the gallbladder fossa using the laparoscopic Bovie cautery. There was good hemostasis.  Once the gallbladder had been disconnected, it was delivered via the laparoscopic EndoCatch bag.      A thorough washout of the right upper quadrant was performed.  The returning effluent was clear.Then the abdomen was desufflated and the specimen delivered and passed off for analysis.  The port was discontinued.  The midline fascia  was closed using a figure-of-eight 0 Vicryl UR-6 stitch.  Wounds were washed, injected with 0.5% Marcaine.  Skin was closed using a 4-0 Monocryl stitch.  Clean dressings were applied.  The patient tolerated the procedure well and was transferred to PACU in a stable condition.  Sponge, instrument and needle counts correct x2.        MD Cachorro VAZQUEZ Sumedh D, MD  Phone Number: [phone]

## 2020-05-21 NOTE — ASSESSMENT & PLAN NOTE
- patient with cholelithiasis on imaging and could not rule out cholecystitis  - started on rocephin, flagyl  - POD #1 for lap choley  - surgery advancing diet  - appreciate surgery and GI recs

## 2020-05-21 NOTE — OR SURGEON
Pre-Procedure patient identification:  I am the primary operating surgeon/proceduralist and I have identified the patient on 05/21/20 at 1:31 PM Zak Ivory MD  Phone Number: [phone]

## 2020-05-21 NOTE — DISCHARGE INSTRUCTIONS
Call the office to make an appointment to see your surgeon 2 weeks from your discharge or if having any problems.    You can restart all your previous home medications, if any.  TALK TO YOUR SURGEON IF YOU ARE TAKING BLOOD THINNERS PRIOR TO RESTARTING THEM.      Wound care:  Clear dressing with gauze over belly-button can come off 2 days after surgery  You can shower after the dressing is off.  You can expect mild bloody discharge from the wounds, it is normal  Let water run over the steristrips(paper-tapes), they can get wet.  Do not soak wounds in a bath-tub  The steri-strips will fall off on their own or your surgeon will take them off when you see him/her in the office  VERY IMPORTANT: With a dry washcloth pad dry the wounds, especially the belly-button to remove all moisture.    Signs or symptoms to watch out for:  Fever >101  Persistent Nausea/Vomiting which won't go away  Drainage of blood or pus from incisions  Warmth, redness or increasing tenderness around incisions  Increasing abdominal pain  Prevent getting constipated by taking Colace 100mg BID, to ensure at least 1 soft bowel movement a day, narcotic medications you are on can be constipating. You may take any other stool softener that is over the counter instead as well.    DIET: Drink plenty of fluids and eat foods high in fiber while taking prescription pain medication, as it may be constipating.      ACTIVITY RESTRICTIONS:    DRIVING: No driving for at least 1-2 weeks and while on prescription pain medication.  STAIRS: No restrictions.  WALKING: No restrictions.  LIFTING: Nothing more than a gallon of milk (10lbs).   SEXUAL: No restrictions.  BATHING: Showers okay. No baths or swimming.  WORK/SCHOOL: No work/school for 2 weeks. May return to light duty after recovery.

## 2020-05-21 NOTE — ED ATTESTATION NOTE
The patient was evaluated and managed by the physician assistant / nurse practitioner.     Abdi Longo, DO  05/20/20 [id number]

## 2020-05-21 NOTE — ANESTHESIOLOGIST PRE-PROCEDURE ATTESTATION
Pre-Procedure Patient Identification:  I am the Primary Anesthesiologist and have identified the patient on 05/21/20 at 1:30 PM.   I have confirmed the following procedure(s) CHOLECYSTECTOMY LAPAROSCOPIC will be performed by the following surgeon/proceduralist Zak Ivory MD.

## 2020-05-21 NOTE — BRIEF OP NOTE
CHOLECYSTECTOMY LAPAROSCOPIC Procedure Note    Procedure:    CHOLECYSTECTOMY LAPAROSCOPIC  CPT(R) Code:  84394 - [place] LAP,CHOLECYSTECTOMY      Pre-op Diagnosis     * Cholecystitis [K81.9]       Post-op Diagnosis     * Cholecystitis [K81.9]    Surgeon(s) and Role:     * Zak Ivory MD - Primary    Anesthesia: General    Staff:   Circulator: Michelle Bruno RN; Felipa Lewis RN  Physician Assistant: MAURICIO Nj PA C  Scrub Person: Rose Koenig    Procedure Details   See op note    Estimated Blood Loss: 50 mL    Specimens:                Order Name Source Comment Collection Info Order Time   PATHOLOGY TISSUE EXAM Gallbladder Pre-op diagnosis:  cholecystitis  K81.9 Collected By: Zak Ivory MD 5/21/2020  2:38 PM         Drains:   Urethral Catheter 14 Fr (Active)   Reason for Continuing Urinary Catheterization Known or suspected urinary tract obstruction 5/21/2020  9:00 AM   Urine Characteristics yellow 5/21/2020  9:00 AM   Securement Method Securing Device 5/21/2020  9:00 AM   Foreskin uncircumcised: foreskin replaced over glans 5/20/2020  7:32 PM   Tolerance no signs/symptoms of discomfort 5/21/2020  9:00 AM   Output (mL) 600 mL 5/21/2020  6:00 AM   Net Urine Output (mL) 600 mL 5/21/2020  6:00 AM       Implants: * No implants in log *           Complications:  None; patient tolerated the procedure well.           Disposition: PACU - hemodynamically stable.           Condition: stable    Zak Ivory MD  Phone Number: [phone]

## 2020-05-21 NOTE — ANESTHESIA PREPROCEDURE EVALUATION
Relevant Problems   CARDIOVASCULAR   (+) Essential hypertension      GASTROINTESTINAL   (+) Fatty liver disease, nonalcoholic   (+) Gastroesophageal reflux disease without esophagitis      HEMATOLOGY   (+) Thrombocytopenia (CMS/HCC)      NEUROLOGY   (+) History of Graves' disease      URINARY SYSTEM   (+) BPH (benign prostatic hyperplasia)      Other   (+) Other specified hypothyroidism       Anesthesia ROS/MED HX      Cardiovascular   Echocardiogram reviewed and ECG reviewed  Endo/Other   Diabetes   Hypothyroidism  Body Habitus: Normal       Past Surgical History:   Procedure Laterality Date   • COLONOSCOPY     • EYE SURGERY Bilateral     from Graves   • SINUS SURGERY         Physical Exam    Airway   Mallampati: II   TM distance: >3 FB   Neck ROM: full  Cardiovascular    Rhythm: regularPulmonary - normal   clear to auscultation  Dental    Teeth Problems: missing    Anesthesia  Exam Comments:   Exam Airway: Small mouth opening          Anesthesia Plan    Plan: general    Technique: general endotracheal     Lines and Monitors: PIV     Airway: oral intubation   ASA 2 - emergent  Anesthetic plan and risks discussed with: patient  Induction:    intravenous   Postop Plan:   Patient Disposition: inpatient floor planned admission   Pain Management: IV analgesics

## 2020-05-21 NOTE — ASSESSMENT & PLAN NOTE
- patient presented with RUQ/epigastric pain  - MRCP showed: mild biliary ductal dilatation with no evidence of choledocholithiasis; suspicious for acute interstitial edematous pancreatitis; extensive cholelithiasis with questionable pericholecystic edema; raises the possibility of a mild underlying cholecystitis.  - Lipase elevated at 1650 on admission  - POD #1 for lap choley  - monitor LFTs and BMP  - appreciate surgery and GI recs

## 2020-05-21 NOTE — ANESTHESIA PROCEDURE NOTES
Airway  Urgency: elective    Start Time: 5/21/2020 2:13 PM  Airway not difficult    General Information and Staff    Patient location during procedure: OR  Anesthesiologist: Jorge Casper MD  Resident/CRNA: Citlalli Maharaj CRNA  Performed: resident/CRNA     Indications and Patient Condition  Indications for airway management: anesthesia  Sedation level: general  Preoxygenated: yes  Patient position: sniffing  Mask difficulty assessment: 1 - vent by mask    Final Airway Details  Final airway type: endotracheal airway      Successful airway: ETT  Cuffed: yes   Successful intubation technique: video laryngoscopy  Endotracheal tube insertion site: oral  Blade: Darrel  Blade size: #4  ETT size (mm): 7.5  Cormack-Lehane Classification: grade I - full view of glottis  Placement verified by: chest auscultation, capnometry and palpation of cuff   Measured from: lips  ETT to lips (cm): 23  Number of attempts at approach: 1  Ventilation between attempts: none  Number of other approaches attempted: 0  Traumatic airway insertion      Additional Comments  Poor dentition, teeth unchanged after DL

## 2020-05-21 NOTE — H&P (VIEW-ONLY)
"   Hospital Medicine Service -  Inpatient Consultation         Requesting Physician: Dr. MARIETTA Ivory - surgery    Reason for Consultation: Medical management     HISTORY OF PRESENT ILLNESS        This is a 64 y.o. male who presented to the ER tonight with c/o having RUQ abdominal pain since the AM on 05/20/20. In the ER, his CT abdomen showed \"Cholelithiasis with intrahepatic and extra hepatic biliary dilatation\". An MRCP was then ordered by surgery and was done in the same night, read as \"Mild biliary ductal dilatation with no evidence of choledocholithiasis. MRI findings suspicious for acute interstitial edematous pancreatitis.Extensive cholelithiasis with questionable pericholecystic edema.  This raises the possibility of a mild underlying cholecystitis\". Lipase is high at 1650.    During my interview, Mr. Campos is AAO x 3, able to make good conversation and looking in NAD while at rest. He denies any nausea, vomiting, loose BM, fever or chest pain.    PAST MEDICAL AND SURGICAL HISTORY        Past Medical History:   Diagnosis Date   • BPH (benign prostatic hyperplasia)    • Graves disease    • Hypertension    • Lipid disorder    • Male erectile dysfunction    • Type 2 diabetes mellitus (CMS/HCC)        Past Surgical History:   Procedure Laterality Date   • COLONOSCOPY     • EYE SURGERY Bilateral     from Graves   • SINUS SURGERY         PCP: Clint Coker Jr., DO    MEDICATIONS        Home Medications:  Medications Prior to Admission   Medication Sig Dispense Refill Last Dose   • amLODIPine (NORVASC) 10 mg tablet take 1 tablet by mouth once daily 90 tablet 1    • levothyroxine (SYNTHROID) 125 mcg tablet take 1 tablet by mouth once daily 90 tablet 3    • atorvastatin (LIPITOR) 10 mg tablet take 1 tablet by mouth once daily (Patient not taking: Reported on 5/20/2020) 30 tablet 0 Not Taking at Unknown time   • lisinopriL-hydrochlorothiazide (PRINZIDE,ZESTORETIC) 20-12.5 mg per tablet take 1 tablet by mouth " once daily 90 tablet 0    • metFORMIN (GLUCOPHAGE) 500 mg tablet take 1 tablet by mouth AT NIGHT as directed (Patient not taking: Reported on 2020) 90 tablet 3 Not Taking at Unknown time       Current inpatient medications were personally reviewed.    ALLERGIES        No known allergies    FAMILY HISTORY        Family History   Problem Relation Age of Onset   • Other Biological Mother         natural causes -  at age 75   • Hypertension Biological Father    • Other Biological Father         natural causes -  at age 78   • No Known Problems Biological Sister        SOCIAL HISTORY        Social History     Socioeconomic History   • Marital status:      Spouse name: None   • Number of children: None   • Years of education: None   • Highest education level: None   Occupational History   • Occupation: Maintenance     Comment: [employer]   Social Needs   • Financial resource strain: None   • Food insecurity:     Worry: None     Inability: None   • Transportation needs:     Medical: None     Non-medical: None   Tobacco Use   • Smoking status: Current Every Day Smoker     Packs/day: 0.75     Years: 40.00     Pack years: 30.00     Types: Cigarettes     Start date:    • Smokeless tobacco: Current User   Substance and Sexual Activity   • Alcohol use: No   • Drug use: No   • Sexual activity: Defer   Lifestyle   • Physical activity:     Days per week: None     Minutes per session: None   • Stress: None   Relationships   • Social connections:     Talks on phone: None     Gets together: None     Attends Orthodox service: None     Active member of club or organization: None     Attends meetings of clubs or organizations: None     Relationship status: None   • Intimate partner violence:     Fear of current or ex partner: None     Emotionally abused: None     Physically abused: None     Forced sexual activity: None   Other Topics Concern   • None   Social History Narrative    Lives with his wife.  "      REVIEW OF SYSTEMS        All other systems reviewed and negative except as noted in HPI    PHYSICAL EXAMINATION        Visit Vitals  BP (!) 143/77   Pulse (!) 57   Temp 36.4 °C (97.6 °F) (Oral)   Resp 18   Ht 1.753 m (5' 9\")   Wt 84 kg (185 lb 3.2 oz)   SpO2 96%   BMI 27.35 kg/m²     Body mass index is 27.35 kg/m².    Intake/Output Summary (Last 24 hours) at 5/21/2020 0400  Last data filed at 5/20/2020 2321  Gross per 24 hour   Intake 500 ml   Output 2150 ml   Net -1650 ml       Physical Exam     HEENT: NCAT, PERRLA,  EOMI, supple neck, clear oropharynx.  Skin: intact, normal complexion for race.  Chest: good expansion with inspiration B/L, symmetric.  Lungs: clear to auscultation B/L.  CV : S1, S2, RRR, no pathologic sounds.  Abdomen: soft, TTP in the LUQ, not distended, no organomegaly, +BS in all four quadrants.  Neuro: AAO x 3, no gross focal deficit.  Extremities: - ECC, fair ROM in all joints B/L.    LABS / EKG        Labs  I have reviewed the patient's pertinent labs.  Significant abnormals are Lipase 1650, , , ..    ECG/Telemetry  Telemetry shows a SR at 86/min.    Imaging  I have independently reviewed the pertinent imaging from the last 24 hrs.    ASSESSMENT AND RECOMMENDATIONS           Pancreatitis, acute  Assessment & Plan  Described on the MRCP, Lipase is also elevated at 1650.  NPO except meds, IVF, IV pain meds.    Cholelithiasis  Assessment & Plan  CT abdomen done tonight shows \"Cholelithiasis with intrahepatic and extra hepatic biliary dilatation\".  The patient was admitted by the general surgery team.  [facility] [place].    Thrombocytopenia (CMS/HCC)  Assessment & Plan  Listed as a chronic condition in the PCP Dr. DAGOBERTO Grijalva's progress notes.  Plts are 162 tonight.  Will monitor CBC.    Diet-controlled diabetes mellitus (CMS/HCC)  Assessment & Plan  Most recent Hgb A1c was 6.5 less than four months ago.  Monitor BG levels and use ISS for " coverage.      Postablative hypothyroidism  Assessment & Plan  Will check TFTs if not done in the last six months.  Continue Synthroid.    Essential hypertension  Assessment & Plan  Monitor BP levels .  Continue home meds, except for the diuretic.         Thank you for the consult!     Faraz Hernandez MD  5/21/2020

## 2020-05-21 NOTE — PLAN OF CARE
Problem: Adult Inpatient Plan of Care  Goal: Plan of Care Review  Flowsheets (Taken 2020 1216)  Progress: improving  Plan of Care Reviewed With: patient  Outcome Summary: Pt discussed in Care Progression Rounds. Pt agreeable to plan of care.     Problem: Adult Inpatient Plan of Care  Goal: Readiness for Transition of Care  Intervention: Mutually Develop Transition Plan  Flowsheets (Taken 2020 1223)  Anticipated Discharge Disposition: home without services  Equipment Needed After Discharge: none  Equipment Currently Used at Home: none  Anticipated Changes Related to Illness: none  Transportation Concerns: car, none  Readmission Within the Last 30 Days: no previous admission in last 30 days  Patient/Family Anticipated Services at Transition: none  Patient/Family Anticipates Transition to: home; home with family  Transportation Anticipated: family or friend will provide  Concerns to be Addressed: denies needs/concerns at this time  Offered/Gave Vendor List: no   Discharge Coordination/Progress Note:  Pt discussed in Care Progression Rounds. Telephone call to patient at bedside to discuss discharge planning due to COVID-19 precautions. Explained role of Care Coordination Team. Verified , ID and demographic information. Pt's PCP is Dr Clint Grijalva.     Living Arrangements: Pt lives with spouse in a first floor apartment. Pt states he was independent PTA.        Home Care/ Current Needs: No history of any HC int he past. Pt does not anticipate any needs upon discharge.    Pharmacy: [facility] in [place], [place]      Anticipated Discharge Needs/Disposition:Plan is home without services. Pt is currently NPO for possible OR this afternoon. CC will continue to follow.           Signed by: Hemalatha Talley RN BSN,CC [id number]

## 2020-05-22 VITALS
WEIGHT: 185.2 LBS | OXYGEN SATURATION: 100 % | HEART RATE: 57 BPM | HEIGHT: 69 IN | BODY MASS INDEX: 27.43 KG/M2 | SYSTOLIC BLOOD PRESSURE: 140 MMHG | TEMPERATURE: 98.3 F | DIASTOLIC BLOOD PRESSURE: 79 MMHG | RESPIRATION RATE: 18 BRPM

## 2020-05-22 LAB
ALBUMIN SERPL-MCNC: 3.3 G/DL (ref 3.4–5)
ALP SERPL-CCNC: 87 IU/L (ref 35–126)
ALT SERPL-CCNC: 220 IU/L (ref 16–63)
ANION GAP SERPL CALC-SCNC: 11 MEQ/L (ref 3–15)
AST SERPL-CCNC: 86 IU/L (ref 15–41)
BILIRUB DIRECT SERPL-MCNC: 0.1 MG/DL
BILIRUB SERPL-MCNC: 0.5 MG/DL (ref 0.3–1.2)
BUN SERPL-MCNC: 16 MG/DL (ref 8–20)
CALCIUM SERPL-MCNC: 8.4 MG/DL (ref 8.9–10.3)
CHLORIDE SERPL-SCNC: 102 MEQ/L (ref 98–109)
CO2 SERPL-SCNC: 25 MEQ/L (ref 22–32)
CREAT SERPL-MCNC: 0.9 MG/DL (ref 0.8–1.3)
ERYTHROCYTE [DISTWIDTH] IN BLOOD BY AUTOMATED COUNT: 12.4 % (ref 11.6–14.4)
GFR SERPL CREATININE-BSD FRML MDRD: >60 ML/MIN/1.73M*2
GLUCOSE BLD-MCNC: 148 MG/DL (ref 70–99)
GLUCOSE BLD-MCNC: 154 MG/DL (ref 70–99)
GLUCOSE SERPL-MCNC: 143 MG/DL (ref 70–99)
HCT VFR BLDCO AUTO: 35.4 % (ref 40.1–51)
HGB BLD-MCNC: 12.2 G/DL (ref 13.7–17.5)
MAGNESIUM SERPL-MCNC: 2.1 MG/DL (ref 1.8–2.5)
MCH RBC QN AUTO: 31.1 PG (ref 28–33.2)
MCHC RBC AUTO-ENTMCNC: 34.5 G/DL (ref 32.2–36.5)
MCV RBC AUTO: 90.3 FL (ref 83–98)
PDW BLD AUTO: 12.6 FL (ref 9.4–12.4)
PLATELET # BLD AUTO: 139 K/UL (ref 150–350)
POCT TEST: ABNORMAL
POCT TEST: ABNORMAL
POTASSIUM SERPL-SCNC: 3.8 MEQ/L (ref 3.6–5.1)
PROT SERPL-MCNC: 6.4 G/DL (ref 6–8.2)
RBC # BLD AUTO: 3.92 M/UL (ref 4.5–5.8)
SODIUM SERPL-SCNC: 138 MEQ/L (ref 136–144)
WBC # BLD AUTO: 10.11 K/UL (ref 3.8–10.5)

## 2020-05-22 PROCEDURE — 80048 BASIC METABOLIC PNL TOTAL CA: CPT | Performed by: SURGERY

## 2020-05-22 PROCEDURE — 82248 BILIRUBIN DIRECT: CPT | Performed by: SURGERY

## 2020-05-22 PROCEDURE — 25000000 HC PHARMACY GENERAL: Performed by: SURGERY

## 2020-05-22 PROCEDURE — 83735 ASSAY OF MAGNESIUM: CPT | Performed by: SURGERY

## 2020-05-22 PROCEDURE — 99232 SBSQ HOSP IP/OBS MODERATE 35: CPT | Performed by: INTERNAL MEDICINE

## 2020-05-22 PROCEDURE — 99024 POSTOP FOLLOW-UP VISIT: CPT | Performed by: SURGERY

## 2020-05-22 PROCEDURE — 36415 COLL VENOUS BLD VENIPUNCTURE: CPT | Performed by: SURGERY

## 2020-05-22 PROCEDURE — 63700000 HC SELF-ADMINISTRABLE DRUG: Performed by: SURGERY

## 2020-05-22 PROCEDURE — 63600000 HC DRUGS/DETAIL CODE: Performed by: SURGERY

## 2020-05-22 PROCEDURE — 85027 COMPLETE CBC AUTOMATED: CPT | Performed by: SURGERY

## 2020-05-22 RX ORDER — OXYCODONE AND ACETAMINOPHEN 5; 325 MG/1; MG/1
1 TABLET ORAL EVERY 6 HOURS PRN
Qty: 15 TABLET | Refills: 0 | Status: SHIPPED | OUTPATIENT
Start: 2020-05-22 | End: 2020-06-19

## 2020-05-22 RX ORDER — INSULIN ASPART 100 [IU]/ML
3-5 INJECTION, SOLUTION INTRAVENOUS; SUBCUTANEOUS
Status: DISCONTINUED | OUTPATIENT
Start: 2020-05-22 | End: 2020-05-22 | Stop reason: HOSPADM

## 2020-05-22 RX ADMIN — DOCUSATE SODIUM 100 MG: 100 CAPSULE, LIQUID FILLED ORAL at 08:24

## 2020-05-22 RX ADMIN — METRONIDAZOLE 500 MG: 500 INJECTION, SOLUTION INTRAVENOUS at 11:46

## 2020-05-22 RX ADMIN — METRONIDAZOLE 500 MG: 500 INJECTION, SOLUTION INTRAVENOUS at 03:12

## 2020-05-22 RX ADMIN — AMLODIPINE BESYLATE 10 MG: 10 TABLET ORAL at 06:27

## 2020-05-22 RX ADMIN — LEVOTHYROXINE SODIUM 125 MCG: 125 TABLET ORAL at 06:26

## 2020-05-22 RX ADMIN — HEPARIN SODIUM 5000 UNITS: 5000 INJECTION INTRAVENOUS; SUBCUTANEOUS at 06:27

## 2020-05-22 NOTE — PATIENT CARE CONFERENCE
Care Progression Rounds Note  Date: 5/22/2020  Time: 1:18 PM     Patient Name: Rhys Campos     Medical Record Number: [id number]   YOB: 1955  Sex: Male      Room/Bed: 4207W    Admitting Diagnosis: Calculus of gallbladder and bile duct with obstruction without cholecystitis [K80.71]  Acute biliary pancreatitis, unspecified complication status [K85.10]   Admit Date/Time: 5/20/2020  3:10 PM    Primary Diagnosis: Pancreatitis, acute  Principal Problem: Pancreatitis, acute    GMLOS: 3.6  Anticipated Discharge Date: 5/25/2020    AM-PAC  Mobility Score:      Discharge Planning:  Living Arrangements: apartment  Anticipated Discharge Disposition: home without services    Barriers to Discharge:  Barriers to Discharge: Medical issues not resolved  Comment: POD1, DTV, IV ATB    Participants:  , social work/services, nursing

## 2020-05-22 NOTE — NURSING NOTE
Pt states he has voided multiple times since domínguez removed.  Random bladder scan showed 173 cc.  Pt voided an additional 100 cc.  Dr. Ivory made aware.  Okay for patient to be discharged.

## 2020-05-22 NOTE — DISCHARGE SUMMARY
Inpatient Discharge Summary    BRIEF OVERVIEW  Admitting Provider: Zak Ivory MD  Discharge Provider: Zak Ivory MD  Primary Care Physician at Discharge: Clint Coker Jr.,  [phone]     Admission Date: 5/20/2020     Discharge Date: 5/22/2020    Primary Discharge Diagnosis  Pancreatitis, acute    Secondary Discharge Diagnosis      Discharge Disposition  Home   Code Status at Discharge: Full Code    Discharge Medications     Medication List      START taking these medications    oxyCODONE-acetaminophen 5-325 mg per tablet  Commonly known as:  PERCOCET  Take 1 tablet by mouth every 6 (six) hours as needed for severe pain for up to 5 days.  Dose:  1 tablet        CONTINUE taking these medications    amLODIPine 10 mg tablet  Commonly known as:  NORVASC  take 1 tablet by mouth once daily     atorvastatin 10 mg tablet  Commonly known as:  LIPITOR  take 1 tablet by mouth once daily     levothyroxine 125 mcg tablet  Commonly known as:  SYNTHROID  take 1 tablet by mouth once daily     lisinopriL-hydrochlorothiazide 20-12.5 mg per tablet  Commonly known as:  PRINZIDE,ZESTORETIC  take 1 tablet by mouth once daily  Dose:  1 tablet     metFORMIN 500 mg tablet  Commonly known as:  GLUCOPHAGE  take 1 tablet by mouth AT NIGHT as directed            Active Issues Requiring Follow-up      Outpatient Follow-Up            In 1 week [facility] CT[facility] - CAT Scan    In 4 weeks Clint Coker Jr, DO [facility] in [place]              Test Results Pending at Discharge   Order Current Status    Pathology Tissue Exam In process          DETAILS OF HOSPITAL STAY    Presenting Problem/History of Present Illness  Calculus of gallbladder and bile duct with obstruction without cholecystitis [K80.71]  Acute biliary pancreatitis, unspecified complication status [K85.10]      Operative Procedures Performed  Procedure(s):  CHOLECYSTECTOMY  LAPAROSCOPIC      Hospital Course   64-year-old gentleman was admitted with gallstone pancreatitis.  He had an MRCP that revealed no stone and the common bile duct.  He was seen in consultation with both internal medicine and gastroenterology who helped comanage him.  He underwent a laparoscopic cholecystectomy on 5/21/2020.  His postoperative labs showed a normalizing lipase and normal LFTs.  He was discharged to home with a prescription for Percocet to be taken as and when required for pain.  He was instructed to follow-up via telemedicine in 2 weeks time for a postoperative visit.  He knows to call with any other questions or concerns.

## 2020-05-22 NOTE — PROGRESS NOTES
Hospital Medicine Service -  Daily Progress Note       SUBJECTIVE   Interval History: Patient seen laying in bed. POD #1 for lap choley. Patient notes mild abdominal pain. Passing flatus. Tolerating diet. Denies fevers or chills.     OBJECTIVE      Vital signs in last 24 hours:  Temp:  [36.4 °C (97.6 °F)-37 °C (98.6 °F)] 36.8 °C (98.3 °F)  Heart Rate:  [53-73] 57  Resp:  [10-26] 18  BP: (114-155)/(66-92) 140/79    Intake/Output Summary (Last 24 hours) at 5/22/2020 0947  Last data filed at 5/22/2020 0601  Gross per 24 hour   Intake 850 ml   Output 2725 ml   Net -1875 ml       PHYSICAL EXAMINATION      General: well appearing, NAD  HEENT: MMM, PERRL, anicteric sclera   Neck: no JVD  Cardiac: RRR, +S1/S2, no m/r/g  Lungs: clear bilaterally, no wheezing  Abdomen: mild distension, incision sites c/d/i  Extremities: no edema, clubbing, cyanosis   Neuro: AAOx3, nonfocal  Skin: warm, well perfused  Psych: cooperative, appropriate   LABS / IMAGING / TELE      Labs  Lipase 186    Imaging  MRCP reviewed by me:  IMPRESSION:  1.  Mild biliary ductal dilatation with no evidence of choledocholithiasis.  2.  MRI findings suspicious for acute interstitial edematous pancreatitis.  3.  Extensive cholelithiasis with questionable pericholecystic edema.  This  raises the possibility of a mild underlying cholecystitis.  4.  Image quality is slightly degraded by motion artifact on MRCP.  5.  Mild ectasia of the abdominal aorta.  Continued imaging follow-up  recommended.    ECG/Telemetry  N/A    ASSESSMENT AND PLAN      * Pancreatitis, acute  Assessment & Plan  - patient presented with RUQ/epigastric pain  - MRCP showed: mild biliary ductal dilatation with no evidence of choledocholithiasis; suspicious for acute interstitial edematous pancreatitis; extensive cholelithiasis with questionable pericholecystic edema; raises the possibility of a mild underlying cholecystitis.  - Lipase elevated at 1650 on admission  - POD #1 for lap choley  -  monitor LFTs and BMP  - appreciate surgery and GI recs    Cholelithiasis  Assessment & Plan  - patient with cholelithiasis on imaging and could not rule out cholecystitis  - started on rocephin, flagyl  - POD #1 for lap choley  - surgery advancing diet  - appreciate surgery and GI recs    Thrombocytopenia (CMS/HCC)  Assessment & Plan  - Listed as a chronic condition in the PCP notes  - Plts 139  - monitor CBC      Diet-controlled diabetes mellitus (CMS/HCC)  Assessment & Plan  - patient with diet controlled DM no longer on meds  - SSI with accuchecks      Other specified hypothyroidism  Assessment & Plan  - continue Synthroid    Pure hypercholesterolemia  Assessment & Plan  - patient with known HLD no longer on statin    Essential hypertension  Assessment & Plan  - patient with known HTN on amlodipine, HCTZ-ACE-I  - continue amlodipine, holding HCTZ-ACE-I  - monitor BP           VTE Assessment: Padua VTE Score: 4  VTE Prophylaxis Plan: heparin  Code Status: Full Code  Estimated Discharge Date: 5/25/2020  Discussed case with: surgery     Charlotte Guidry, DO  5/22/2020

## 2020-05-22 NOTE — NURSING NOTE
Removed domínguez 0610. Due to void. No signs or symptoms of distress, well tolerated. Fluids d/c. Tolerating po clears. Iv abx as ordered. Sleeping between care. Refused insulin- states does not take any DM meds at home. Educated pt about 2100 247 blood sugar and sliding scale. No complaints of pain or discomfort. scds in place, heparin initiated per order. Pulse ox 98% RA. Post op VSS. abdominal incision sites are clean, dry, and intact. No drainage. Bed alarm set, urinal within reach, personal items within reach.

## 2020-05-26 ENCOUNTER — PATIENT OUTREACH (OUTPATIENT)
Dept: CASE MANAGEMENT | Facility: CLINIC | Age: 65
End: 2020-05-26

## 2020-05-26 LAB
CASE RPRT: NORMAL
CLINICAL INFO: NORMAL
PATH REPORT.FINAL DX SPEC: NORMAL
PATH REPORT.GROSS SPEC: NORMAL

## 2020-05-26 NOTE — PROGRESS NOTES
NAME: Rhys Campos    MRN: [id number]    YOB: 1955    EVENT DETAILS    Discharging Facility: [facility]  Date of Admission: 05/20/20  Date of Discharge: 05/22/20  Discharge Instructions Reviewed?: Yes     Reason for Admission:  Acute Pancreatitis    HPI: Spoke with patients spouse today, presented to [place] ED with symptoms of RUQ ABD pain, dilated CBD, increased Lipase.Patient was DX with gallbladder full of stones.    S/P Laparoscopic cholecystectomy DR Ivory.  His postoperative labs showed a normalizing lipase and normal LFTs.    Patient discharged to home, patient postoperative labs showed a normalizing lipase and normal LFTs.   Patient discharged with Percocet for pain, instructed on taking pain med's with food, and staying hydrated.      MEDICATION REVIEW:    Reported by:: Spouse or Significant Other  Prescriptions Filled?: Yes     Was a medication discrepancy indentified?: No     Medication understanding?: Yes     Medication Adherence?: Yes     Any side effects from medication?: No       Medication review Reviewed, see medication history    Additional Comments: Patient was prescribed Percocet for pain, currently no taking medication.      FOLLOW-UP TESTS/PROCEDURES:    DR Coker 06/16    CT Chest 06/03    DR Ivory to be scheduled    HOME MANAGEMENT    Living Arrangement: Spouse or Significant Other     HOME CARE SERVICES    Receiving Home Care Services: No        DURABLE MEDICAL EQUIPMENT    Durable Medical Equipment: None  Oxygen Use: No        BARRIERS TO CARE    SELF-CARE    Living Arrangement: Spouse or Significant Other     SOCIOECONOMIC    Financial Problems?: No     Transportation Issues?: No        INTERVENTION/CARE COORDINATION    Interventions/ Care Coordination: Encouraged patient to call PCP/Specialist    PLAN OF CARE: Patient to stay hydrated, report any increased pain, fever to surgeon.    Reviewed signs/symptoms of worsening condition or complication that  necessitate a call to the Physician's office.  Educated patient on access to care.  RN phone number given for future care management needs.       Elicia Flores RN BSN A ONC  [phone]

## 2020-06-03 ENCOUNTER — HOSPITAL ENCOUNTER (OUTPATIENT)
Dept: RADIOLOGY | Age: 65
Discharge: HOME | End: 2020-06-03
Attending: FAMILY MEDICINE
Payer: COMMERCIAL

## 2020-06-03 ENCOUNTER — APPOINTMENT (OUTPATIENT)
Dept: LAB | Age: 65
End: 2020-06-03
Attending: FAMILY MEDICINE
Payer: COMMERCIAL

## 2020-06-03 DIAGNOSIS — D69.6 THROMBOCYTOPENIA (CMS/HCC): ICD-10-CM

## 2020-06-03 DIAGNOSIS — R91.8 MULTIPLE LUNG NODULES ON CT: ICD-10-CM

## 2020-06-03 DIAGNOSIS — E11.9 DIET-CONTROLLED DIABETES MELLITUS (CMS/HCC): ICD-10-CM

## 2020-06-03 DIAGNOSIS — E78.00 PURE HYPERCHOLESTEROLEMIA: ICD-10-CM

## 2020-06-03 LAB
ALBUMIN SERPL-MCNC: 4 G/DL (ref 3.4–5)
ALP SERPL-CCNC: 78 IU/L (ref 35–126)
ALT SERPL-CCNC: 47 IU/L (ref 16–63)
AST SERPL-CCNC: 29 IU/L (ref 15–41)
BASOPHILS # BLD: 0.08 K/UL (ref 0.01–0.1)
BASOPHILS NFR BLD: 1.1 %
BILIRUB DIRECT SERPL-MCNC: 0.1 MG/DL
BILIRUB SERPL-MCNC: 0.4 MG/DL (ref 0.3–1.2)
CHOLEST SERPL-MCNC: 138 MG/DL
DIFFERENTIAL METHOD BLD: ABNORMAL
EOSINOPHIL # BLD: 0.52 K/UL (ref 0.04–0.54)
EOSINOPHIL NFR BLD: 7.1 %
ERYTHROCYTE [DISTWIDTH] IN BLOOD BY AUTOMATED COUNT: 12.1 % (ref 11.6–14.4)
EST. AVERAGE GLUCOSE BLD GHB EST-MCNC: 160 MG/DL
HBA1C MFR BLD HPLC: 7.2 %
HCT VFR BLDCO AUTO: 41.5 % (ref 40.1–51)
HDLC SERPL-MCNC: 31 MG/DL
HDLC SERPL: 4.5 {RATIO}
HGB BLD-MCNC: 14 G/DL (ref 13.7–17.5)
IMM GRANULOCYTES # BLD AUTO: 0.03 K/UL (ref 0–0.08)
IMM GRANULOCYTES NFR BLD AUTO: 0.4 %
LDLC SERPL CALC-MCNC: 67 MG/DL
LYMPHOCYTES # BLD: 1.86 K/UL (ref 1.2–3.5)
LYMPHOCYTES NFR BLD: 25.4 %
MCH RBC QN AUTO: 31 PG (ref 28–33.2)
MCHC RBC AUTO-ENTMCNC: 33.7 G/DL (ref 32.2–36.5)
MCV RBC AUTO: 91.8 FL (ref 83–98)
MONOCYTES # BLD: 0.6 K/UL (ref 0.3–1)
MONOCYTES NFR BLD: 8.2 %
NEUTROPHILS # BLD: 4.24 K/UL (ref 1.7–7)
NEUTS SEG NFR BLD: 57.8 %
NONHDLC SERPL-MCNC: 107 MG/DL
NRBC BLD-RTO: 0 %
PDW BLD AUTO: 13.7 FL (ref 9.4–12.4)
PLATELET # BLD AUTO: 195 K/UL (ref 150–350)
PROT SERPL-MCNC: 7.5 G/DL (ref 6–8.2)
RBC # BLD AUTO: 4.52 M/UL (ref 4.5–5.8)
TRIGL SERPL-MCNC: 202 MG/DL (ref 30–149)
WBC # BLD AUTO: 7.33 K/UL (ref 3.8–10.5)

## 2020-06-03 PROCEDURE — 80061 LIPID PANEL: CPT

## 2020-06-03 PROCEDURE — 85025 COMPLETE CBC W/AUTO DIFF WBC: CPT

## 2020-06-03 PROCEDURE — 83036 HEMOGLOBIN GLYCOSYLATED A1C: CPT

## 2020-06-03 PROCEDURE — 80076 HEPATIC FUNCTION PANEL: CPT

## 2020-06-03 PROCEDURE — 36415 COLL VENOUS BLD VENIPUNCTURE: CPT

## 2020-06-03 PROCEDURE — 71250 CT THORAX DX C-: CPT

## 2020-06-10 ENCOUNTER — TELEMEDICINE (OUTPATIENT)
Dept: SURGERY | Facility: CLINIC | Age: 65
End: 2020-06-10
Payer: COMMERCIAL

## 2020-06-10 VITALS — WEIGHT: 180 LBS | BODY MASS INDEX: 26.66 KG/M2 | HEIGHT: 69 IN

## 2020-06-10 DIAGNOSIS — Z48.89 POSTOPERATIVE VISIT: Primary | ICD-10-CM

## 2020-06-10 PROCEDURE — 99024 POSTOP FOLLOW-UP VISIT: CPT | Mod: 95 | Performed by: SURGERY

## 2020-06-19 ENCOUNTER — TELEPHONE (OUTPATIENT)
Dept: FAMILY MEDICINE | Facility: CLINIC | Age: 65
End: 2020-06-19

## 2020-06-19 ENCOUNTER — OFFICE VISIT (OUTPATIENT)
Dept: FAMILY MEDICINE | Facility: CLINIC | Age: 65
End: 2020-06-19
Payer: COMMERCIAL

## 2020-06-19 VITALS
WEIGHT: 186 LBS | HEART RATE: 84 BPM | OXYGEN SATURATION: 98 % | HEIGHT: 69 IN | SYSTOLIC BLOOD PRESSURE: 138 MMHG | TEMPERATURE: 98.1 F | BODY MASS INDEX: 27.55 KG/M2 | DIASTOLIC BLOOD PRESSURE: 86 MMHG | RESPIRATION RATE: 16 BRPM

## 2020-06-19 DIAGNOSIS — R80.9 MICROALBUMINURIA DUE TO TYPE 2 DIABETES MELLITUS (CMS/HCC)  (CMS/HCC): ICD-10-CM

## 2020-06-19 DIAGNOSIS — D69.6 THROMBOCYTOPENIA (CMS/HCC): ICD-10-CM

## 2020-06-19 DIAGNOSIS — I10 ESSENTIAL HYPERTENSION: ICD-10-CM

## 2020-06-19 DIAGNOSIS — Z72.0 TOBACCO ABUSE: ICD-10-CM

## 2020-06-19 DIAGNOSIS — E11.29 MICROALBUMINURIA DUE TO TYPE 2 DIABETES MELLITUS (CMS/HCC)  (CMS/HCC): ICD-10-CM

## 2020-06-19 DIAGNOSIS — R91.8 MULTIPLE LUNG NODULES ON CT: ICD-10-CM

## 2020-06-19 DIAGNOSIS — I77.811 AORTIC ECTASIA, ABDOMINAL (CMS/HCC): ICD-10-CM

## 2020-06-19 DIAGNOSIS — E78.00 PURE HYPERCHOLESTEROLEMIA: ICD-10-CM

## 2020-06-19 DIAGNOSIS — E11.9 DIET-CONTROLLED DIABETES MELLITUS (CMS/HCC): ICD-10-CM

## 2020-06-19 DIAGNOSIS — K85.10 GALLSTONE PANCREATITIS: Primary | ICD-10-CM

## 2020-06-19 DIAGNOSIS — Z12.5 SCREENING FOR PROSTATE CANCER: ICD-10-CM

## 2020-06-19 PROBLEM — K80.20 CHOLELITHIASIS: Status: RESOLVED | Noted: 2019-01-17 | Resolved: 2020-06-19

## 2020-06-19 PROCEDURE — 99214 OFFICE O/P EST MOD 30 MIN: CPT | Performed by: FAMILY MEDICINE

## 2020-06-19 RX ORDER — METFORMIN HYDROCHLORIDE 500 MG/1
TABLET ORAL
Qty: 90 TABLET | Refills: 3 | OUTPATIENT
Start: 2020-06-19

## 2020-06-19 ASSESSMENT — ENCOUNTER SYMPTOMS
DIZZINESS: 0
ABDOMINAL PAIN: 0
NAUSEA: 0
CHILLS: 0
ANAL BLEEDING: 0
POLYDIPSIA: 0
SHORTNESS OF BREATH: 0
BLOOD IN STOOL: 0
VOMITING: 0
DIAPHORESIS: 0
DIARRHEA: 0
NUMBNESS: 0
LIGHT-HEADEDNESS: 0
UNEXPECTED WEIGHT CHANGE: 0
CHEST TIGHTNESS: 0
FREQUENCY: 0
FEVER: 0

## 2020-06-19 NOTE — TELEPHONE ENCOUNTER
Medicine Refill Request    Last Office Visit: 6/19/2020  Last Telemedicine Visit: Visit date not found     Next Office Visit: 9/15/2020  Next Telemedicine Visit: Visit date not found     metFORMIN (GLUCOPHAGE) 500 mg tablet [facility] Claims they never rec'd  E Rx from 04/10/2020.  They are asking we resend Rx.       Current Outpatient Medications:   •  metFORMIN (GLUCOPHAGE) 500 mg tablet, take 1 tablet by mouth AT NIGHT as directed, Disp: 90 tablet, Rfl: 3  •  amLODIPine (NORVASC) 10 mg tablet, take 1 tablet by mouth once daily, Disp: 90 tablet, Rfl: 1  •  atorvastatin (LIPITOR) 10 mg tablet, take 1 tablet by mouth once daily, Disp: 30 tablet, Rfl: 0  •  levothyroxine (SYNTHROID) 125 mcg tablet, take 1 tablet by mouth once daily, Disp: 90 tablet, Rfl: 3  •  lisinopriL-hydrochlorothiazide (PRINZIDE,ZESTORETIC) 20-12.5 mg per tablet, take 1 tablet by mouth once daily, Disp: 90 tablet, Rfl: 0      BP Readings from Last 3 Encounters:   06/19/20 138/86   05/22/20 140/79   01/07/20 132/80       Recent Lab results:  Lab Results   Component Value Date    CHOL 138 06/03/2020   ,   Lab Results   Component Value Date    HDL 31 (L) 06/03/2020   ,   Lab Results   Component Value Date    LDLCALC 67 06/03/2020   ,   Lab Results   Component Value Date    TRIG 202 (H) 06/03/2020        Lab Results   Component Value Date    GLUCOSE 143 (H) 05/22/2020   ,   Lab Results   Component Value Date    HGBA1C 7.2 (H) 06/03/2020         Lab Results   Component Value Date    CREATININE 0.9 05/22/2020       Lab Results   Component Value Date    TSH 4.12 01/23/2020

## 2020-06-19 NOTE — TELEPHONE ENCOUNTER
Medicine Refill Request    Last Office Visit: 6/19/2020  Last Telemedicine Visit: Visit date not found    Next Office Visit: 9/15/2020  Next Telemedicine Visit: Visit date not found         Current Outpatient Medications:   •  amLODIPine (NORVASC) 10 mg tablet, take 1 tablet by mouth once daily, Disp: 90 tablet, Rfl: 1  •  atorvastatin (LIPITOR) 10 mg tablet, take 1 tablet by mouth once daily, Disp: 30 tablet, Rfl: 0  •  levothyroxine (SYNTHROID) 125 mcg tablet, take 1 tablet by mouth once daily, Disp: 90 tablet, Rfl: 3  •  lisinopriL-hydrochlorothiazide (PRINZIDE,ZESTORETIC) 20-12.5 mg per tablet, take 1 tablet by mouth once daily, Disp: 90 tablet, Rfl: 0  •  metFORMIN (GLUCOPHAGE) 500 mg tablet, take 1 tablet by mouth AT NIGHT as directed, Disp: 90 tablet, Rfl: 3      BP Readings from Last 3 Encounters:   06/19/20 138/86   05/22/20 140/79   01/07/20 132/80       Recent Lab results:  Lab Results   Component Value Date    CHOL 138 06/03/2020   ,   Lab Results   Component Value Date    HDL 31 (L) 06/03/2020   ,   Lab Results   Component Value Date    LDLCALC 67 06/03/2020   ,   Lab Results   Component Value Date    TRIG 202 (H) 06/03/2020        Lab Results   Component Value Date    GLUCOSE 143 (H) 05/22/2020   ,   Lab Results   Component Value Date    HGBA1C 7.2 (H) 06/03/2020         Lab Results   Component Value Date    CREATININE 0.9 05/22/2020       Lab Results   Component Value Date    TSH 4.12 01/23/2020

## 2020-06-19 NOTE — ASSESSMENT & PLAN NOTE
-Repeat CT from 6/2020 showed stability of nodule over 9 months  -We will now check next June for annual repeat

## 2020-06-19 NOTE — PATIENT INSTRUCTIONS
Glad you are feeling so much better.  Follow the surgeon's instructions about returning to exercise.  Focus on reduction of snacking, carbs, and sweets.  Have labs repeated before you see me in end of September for your annual physical.    Let me know if you are interested in quitting smoking.

## 2020-06-19 NOTE — ASSESSMENT & PLAN NOTE
-Expressed my concern regarding increased A1c now >7%  -I recommended the addition of metformin, but he declines citing admitted dietary indiscretion while home from work during pandemic  -He will focus on reduction of snacking in carbohydrates specifically  -Repeat A1c in 3 months  -If remaining above 7, we will start metformin 500 mg

## 2020-06-19 NOTE — ASSESSMENT & PLAN NOTE
"-Incidentally noted on MRI as inpatient at 2.7 cm  -We will check an ultrasound of the abdominal aorta in 1 year    -We discussed the relationship between smoking and AAA, but he declines cessation option discussion stating he will \"quit on his own\"  "

## 2020-06-19 NOTE — PROGRESS NOTES
"Subjective      Patient ID: Rhys Campos is a 64 y.o. male.  1955      Here for follow-up from recent hospitalization and to review lab results    Gallstone pancreatitis  -Discharged from [facility] on 5/22/2020  -As an inpatient, had a cholecystectomy  -LFTs normalized post hospitalization  -telemed with surgeon rec 6 wks no lifting. No abd pain, n/v, bleeding/discharge around lap sites, fevers/chills, brbpr, diarrhea.     Diet-controlled diabetes  -With mild microalbuminuria of 35 1/2020  -A1c 7.2 on most recent labs  -\"I watch what I eat\"- reducing red meats, carbs, pizza. More salads and lean protein  -Has not been exercising since pandemic started and due to surgery  -denies frequency or nocturia. No paresthesia of hands/feet    10 mm groundglass RLL nodule  -Stable on CT from 6/2020  -no coughing, chest pain, or sob.     Mild abdominal aortic ectasia  -2.7 cm on MRI of the abdomen from May  -still smoking 10 cigs/day    -home BP ranging in the 120s over 80s consistently      The following have been reviewed and updated as appropriate in this visit:  Tobacco  Allergies  Med Hx  Surg Hx  Fam Hx       Review of Systems   Constitutional: Negative for chills, diaphoresis, fever and unexpected weight change.   Respiratory: Negative for chest tightness and shortness of breath.    Cardiovascular: Negative for chest pain.   Gastrointestinal: Negative for abdominal pain, anal bleeding, blood in stool, diarrhea, nausea and vomiting.   Endocrine: Negative for polydipsia and polyuria.   Genitourinary: Negative for frequency.   Neurological: Negative for dizziness, syncope, light-headedness and numbness.       Objective     Vitals:    06/19/20 0850 06/19/20 0925   BP: 138/80 138/86   BP Location: Left upper arm Left upper arm   Patient Position: Sitting Sitting   Pulse: 84    Resp: 16    Temp: 36.7 °C (98.1 °F)    TempSrc: Oral    SpO2: 98%    Weight: 84.4 kg (186 lb)    Height: 1.753 m (5' 9\")      Body mass index " is 27.47 kg/m².    Physical Exam   Constitutional: He is oriented to person, place, and time. He appears well-developed and well-nourished. No distress.   HENT:   Head: Normocephalic and atraumatic.   Mouth/Throat: Oropharynx is clear and moist. No oropharyngeal exudate.   Eyes: No scleral icterus.   Cardiovascular: Normal rate, regular rhythm and normal heart sounds. Exam reveals no gallop and no friction rub.   No murmur heard.  Pulmonary/Chest: Effort normal and breath sounds normal. No stridor. No respiratory distress. He has no wheezes. He has no rales.   Abdominal: Soft. Bowel sounds are normal. He exhibits no distension and no mass. There is no tenderness. There is no rebound and no guarding.   Musculoskeletal: He exhibits no edema.   Neurological: He is alert and oriented to person, place, and time.   Skin: He is not diaphoretic.   4 laparoscopy sites with Steri-Strips in place and no surrounding tenderness, swelling, or redness.  No bleeding or discharge through the strips.   Psychiatric: He has a normal mood and affect. His behavior is normal. Judgment and thought content normal.       Assessment/Plan   Diagnoses and all orders for this visit:    Gallstone pancreatitis (Primary)  Assessment & Plan:  -Doing very well status post cholecystectomy  -Return to activity protocol as per surgeon      Diet-controlled diabetes mellitus (CMS/[facility])  Assessment & Plan:  -Expressed my concern regarding increased A1c now >7%  -I recommended the addition of metformin, but he declines citing admitted dietary indiscretion while home from work during pandemic  -He will focus on reduction of snacking in carbohydrates specifically  -Repeat A1c in 3 months  -If remaining above 7, we will start metformin 500 mg    Orders:  -     Basic metabolic panel; Future  -     Hemoglobin A1c; Future    Essential hypertension  Assessment & Plan:  -Stable on current regimen    Orders:  -     Basic metabolic panel; Future  -     TSH 3rd  "Generation; Future    Pure hypercholesterolemia  Assessment & Plan:  -LDL actually excellent <70 despite not starting atorvastatin 10 mg  -Even though LDL is so good, given his atherosclerotic disease of the aorta and coronary arteries, I recommend we start atorvastatin 10 mg for primary prevention  -He understands the risk/benefits and still declines      Thrombocytopenia (CMS/HCC)  Assessment & Plan:  -Recent PLT actually normal-trend in 1 year      Microalbuminuria due to type 2 diabetes mellitus (CMS/[facility])  Assessment & Plan:  -Maintained on ACE inhibitor  -Trend annually in January      Aortic ectasia, abdominal (CMS/[facility])  Assessment & Plan:  -Incidentally noted on MRI as inpatient at 2.7 cm  -We will check an ultrasound of the abdominal aorta in 1 year    -We discussed the relationship between smoking and AAA, but he declines cessation option discussion stating he will \"quit on his own\"      Multiple lung nodules on CT  Assessment & Plan:  -Repeat CT from 6/2020 showed stability of nodule over 9 months  -We will now check next June for annual repeat      Screening for prostate cancer  -     PSA; Future    Tobacco abuse      "

## 2020-06-19 NOTE — ASSESSMENT & PLAN NOTE
-LDL actually excellent <70 despite not starting atorvastatin 10 mg  -Even though LDL is so good, given his atherosclerotic disease of the aorta and coronary arteries, I recommend we start atorvastatin 10 mg for primary prevention  -He understands the risk/benefits and still declines

## 2020-08-03 PROBLEM — Z48.89 POSTOPERATIVE VISIT: Status: ACTIVE | Noted: 2020-08-03

## 2020-08-03 NOTE — PROGRESS NOTES
Verification of Patient Location:  The patient affirms they are currently located in the following state:[place]     Request for Consent:  You and I are about to have a telemedicine check-in or visit. This is allowed because you are already my patient, and you have requested it.  This telemedicine visit will be billed to your health insurance or you, if you are self-insured.  You understand you will be responsible for any copayments or coinsurances that apply to your telemedicine visit.  Before starting our telemedicine visit, I am required to get your consent for this virtual check-in or visit by telemedicine. Do you consent?      Patient Response to Request for Consent: Yes    The following have been reviewed and updated as appropriate in this visit:  Tobacco  Allergies  Meds  Problems  Med Hx  Surg Hx  Fam Hx  Soc Hx          Visit Documentation:   and I had a telemedicine visit today with regards to his recent laparoscopic cholecystectomy on 5/21/2020 on an urgent basis for gallstone pancreatitis, the pathology of which revealed chronic calculus cholecystitis.  He has done well from his surgery and has no complaints to offer.  His incisions are healing well.  He starting a regular diet without nausea vomiting.  He denies fevers or chills.  He denies any abdominal pain and is on no pain medications.  No jaundice.  We went over his postoperative restrictions.  He is hereby discharged from under my surgical care and knows to follow-up on a as needed basis.  He knows to call with any other questions queries or concerns.        Time Spent in Medical Discussion During This Encounter:  6 minutes

## 2020-10-07 ENCOUNTER — APPOINTMENT (OUTPATIENT)
Dept: LAB | Age: 65
End: 2020-10-07
Attending: FAMILY MEDICINE
Payer: COMMERCIAL

## 2020-10-07 DIAGNOSIS — E11.9 DIET-CONTROLLED DIABETES MELLITUS (CMS/HCC): ICD-10-CM

## 2020-10-07 DIAGNOSIS — I10 ESSENTIAL HYPERTENSION: ICD-10-CM

## 2020-10-07 DIAGNOSIS — Z12.5 SCREENING FOR PROSTATE CANCER: ICD-10-CM

## 2020-10-07 LAB
ANION GAP SERPL CALC-SCNC: 12 MEQ/L (ref 3–15)
BUN SERPL-MCNC: 19 MG/DL (ref 8–20)
CALCIUM SERPL-MCNC: 9.4 MG/DL (ref 8.9–10.3)
CHLORIDE SERPL-SCNC: 104 MEQ/L (ref 98–109)
CO2 SERPL-SCNC: 25 MEQ/L (ref 22–32)
CREAT SERPL-MCNC: 1 MG/DL (ref 0.8–1.3)
EST. AVERAGE GLUCOSE BLD GHB EST-MCNC: 146 MG/DL
GFR SERPL CREATININE-BSD FRML MDRD: >60 ML/MIN/1.73M*2
GLUCOSE SERPL-MCNC: 102 MG/DL (ref 70–99)
HBA1C MFR BLD HPLC: 6.7 %
POTASSIUM SERPL-SCNC: 4 MEQ/L (ref 3.6–5.1)
SODIUM SERPL-SCNC: 141 MEQ/L (ref 136–144)
TSH SERPL DL<=0.05 MIU/L-ACNC: 4.79 MIU/L (ref 0.34–5.6)

## 2020-10-07 PROCEDURE — 36415 COLL VENOUS BLD VENIPUNCTURE: CPT

## 2020-10-07 PROCEDURE — 84443 ASSAY THYROID STIM HORMONE: CPT

## 2020-10-07 PROCEDURE — 80048 BASIC METABOLIC PNL TOTAL CA: CPT

## 2020-10-07 PROCEDURE — 83036 HEMOGLOBIN GLYCOSYLATED A1C: CPT

## 2020-10-07 PROCEDURE — 84153 ASSAY OF PSA TOTAL: CPT

## 2020-10-09 LAB — PSA SERPL-MCNC: 3.75 NG/ML

## 2020-10-20 ENCOUNTER — OFFICE VISIT (OUTPATIENT)
Dept: PRIMARY CARE | Facility: CLINIC | Age: 65
End: 2020-10-20
Payer: COMMERCIAL

## 2020-10-20 VITALS
HEART RATE: 76 BPM | OXYGEN SATURATION: 97 % | DIASTOLIC BLOOD PRESSURE: 76 MMHG | TEMPERATURE: 98.1 F | RESPIRATION RATE: 16 BRPM | HEIGHT: 69 IN | BODY MASS INDEX: 28.2 KG/M2 | WEIGHT: 190.4 LBS | SYSTOLIC BLOOD PRESSURE: 140 MMHG

## 2020-10-20 DIAGNOSIS — Z00.00 PREVENTATIVE HEALTH CARE: Primary | ICD-10-CM

## 2020-10-20 DIAGNOSIS — R80.9 MICROALBUMINURIA DUE TO TYPE 2 DIABETES MELLITUS (CMS/HCC)  (CMS/HCC): ICD-10-CM

## 2020-10-20 DIAGNOSIS — N40.1 BENIGN PROSTATIC HYPERPLASIA WITH LOWER URINARY TRACT SYMPTOMS, SYMPTOM DETAILS UNSPECIFIED: ICD-10-CM

## 2020-10-20 DIAGNOSIS — E78.00 PURE HYPERCHOLESTEROLEMIA: ICD-10-CM

## 2020-10-20 DIAGNOSIS — Z72.0 TOBACCO ABUSE: ICD-10-CM

## 2020-10-20 DIAGNOSIS — E11.9 DIET-CONTROLLED DIABETES MELLITUS (CMS/HCC): ICD-10-CM

## 2020-10-20 DIAGNOSIS — I77.811 AORTIC ECTASIA, ABDOMINAL (CMS/HCC): ICD-10-CM

## 2020-10-20 DIAGNOSIS — E03.8 OTHER SPECIFIED HYPOTHYROIDISM: ICD-10-CM

## 2020-10-20 DIAGNOSIS — K76.0 FATTY LIVER DISEASE, NONALCOHOLIC: ICD-10-CM

## 2020-10-20 DIAGNOSIS — E11.29 MICROALBUMINURIA DUE TO TYPE 2 DIABETES MELLITUS (CMS/HCC)  (CMS/HCC): ICD-10-CM

## 2020-10-20 DIAGNOSIS — I10 ESSENTIAL HYPERTENSION: ICD-10-CM

## 2020-10-20 DIAGNOSIS — I25.10 CORONARY ARTERY CALCIFICATION SEEN ON CT SCAN: ICD-10-CM

## 2020-10-20 DIAGNOSIS — K63.5 POLYP OF COLON, UNSPECIFIED PART OF COLON, UNSPECIFIED TYPE: ICD-10-CM

## 2020-10-20 DIAGNOSIS — R91.8 MULTIPLE LUNG NODULES ON CT: ICD-10-CM

## 2020-10-20 PROBLEM — Z48.89 POSTOPERATIVE VISIT: Status: RESOLVED | Noted: 2020-08-03 | Resolved: 2020-10-20

## 2020-10-20 PROCEDURE — 99397 PER PM REEVAL EST PAT 65+ YR: CPT | Performed by: FAMILY MEDICINE

## 2020-10-20 RX ORDER — LISINOPRIL AND HYDROCHLOROTHIAZIDE 12.5; 2 MG/1; MG/1
1 TABLET ORAL
Qty: 90 TABLET | Refills: 1 | Status: SHIPPED | OUTPATIENT
Start: 2020-10-20 | End: 2021-01-28 | Stop reason: SDUPTHER

## 2020-10-20 RX ORDER — AMLODIPINE BESYLATE 10 MG/1
10 TABLET ORAL
Qty: 90 TABLET | Refills: 1 | Status: SHIPPED | OUTPATIENT
Start: 2020-10-20 | End: 2021-01-28 | Stop reason: SDUPTHER

## 2020-10-20 RX ORDER — LEVOTHYROXINE SODIUM 137 UG/1
137 TABLET ORAL
Qty: 60 TABLET | Refills: 0 | Status: SHIPPED | OUTPATIENT
Start: 2020-10-20 | End: 2020-12-14 | Stop reason: SDUPTHER

## 2020-10-20 ASSESSMENT — ENCOUNTER SYMPTOMS
ANAL BLEEDING: 0
NUMBNESS: 0
BLOOD IN STOOL: 0
VOMITING: 0
CHEST TIGHTNESS: 0
PALPITATIONS: 0
CHILLS: 0
ABDOMINAL PAIN: 0
DIZZINESS: 0
FATIGUE: 0
DIARRHEA: 0
WEAKNESS: 0
APPETITE CHANGE: 0
NERVOUS/ANXIOUS: 0
HEADACHES: 0
LIGHT-HEADEDNESS: 0
UNEXPECTED WEIGHT CHANGE: 0
FEVER: 0
WHEEZING: 0
NAUSEA: 0
SLEEP DISTURBANCE: 0
DYSPHORIC MOOD: 0
CONSTIPATION: 0
SHORTNESS OF BREATH: 0

## 2020-10-20 NOTE — ASSESSMENT & PLAN NOTE
-Remains without symptoms  -Reassuring that his last LDL was <70 next-continue to follow since he has declined statin therapy in the past

## 2020-10-20 NOTE — ASSESSMENT & PLAN NOTE
-Congratulated on improved A1c now within goal of <7% off medication  -He will continue to focus on dietary adjustments off of the Metformin  -I encouraged him to see his ophthalmologist for diabetic eye check even though he has no symptoms once per year  -Microalbumin to be repeated next visit

## 2020-10-20 NOTE — PROGRESS NOTES
Subjective      Patient ID: Rhys Campos is a 65 y.o. male.  1955      Complaints: none  Changes to Medical Hx:   - home Bps: 120's/80s. Never >130/85.      Diet: well balanced. No sugary beverages. Minimal bread. Still admits to sweets  Exercise: 4 days/week- weights, walking on incline x10 mins- no cp, sob, dizziness, wheezing.   T: 40 yrs x1 ppd, but he has reduced to half pack/day.   EtOH: denies  D: denies  FHx of Cancer: no family history of colon, breast, or prostate cancer  FHx of CVD: denies MI, CVA, stent or bypass surgery.   Lives with: wife  Work: maintenance   Colonoscopy: repeat colonoscopy 10/2019- 3 yr repeat rec  CT Lung: Stable from 6/2020-1-year follow-up due next June. No coughing, wheezing, sob     Prevnar/Pneumovax: Prevnar earlier this mo  Zosta: Due for Shingrix- planning to get in next 1-2 mos  Td/TDap: 9/2020  Flu: UTD     CMP/LP: Most recent labs show an improvement of his A1c from 7.2% down to 6.7%.  His TSH is slightly high at 4.79.  His PSA is stable at 3.75.  HepC: neg 12/3/2018      The following have been reviewed and updated as appropriate in this visit:  Tobacco  Allergies  Surg Hx  Fam Hx       Review of Systems   Constitutional: Negative for appetite change, chills, fatigue, fever and unexpected weight change.   HENT: Negative for hearing loss and tinnitus.    Eyes: Negative for visual disturbance (last saw a few yrs ago- no issues. ).   Respiratory: Negative for chest tightness, shortness of breath and wheezing.    Cardiovascular: Negative for chest pain, palpitations and leg swelling.   Gastrointestinal: Negative for abdominal pain, anal bleeding, blood in stool, constipation, diarrhea, nausea and vomiting.   Genitourinary:        Denies weak stream, incomplete emptying, or nocturia.    Skin: Negative for rash.   Neurological: Negative for dizziness, syncope, weakness, light-headedness, numbness and headaches.   Psychiatric/Behavioral: Negative for dysphoric  "mood and sleep disturbance. The patient is not nervous/anxious.        Objective     Vitals:    10/20/20 1424 10/20/20 1450   BP: 120/86 140/76   BP Location: Left upper arm Left upper arm   Patient Position: Sitting Sitting   Pulse: 76    Resp: 16    Temp: 36.7 °C (98.1 °F)    TempSrc: Oral    SpO2: 97%    Weight: 86.4 kg (190 lb 6.4 oz)    Height: 1.753 m (5' 9\")      Body mass index is 28.12 kg/m².    Physical Exam  Constitutional:       General: He is not in acute distress.     Appearance: Normal appearance. He is well-developed. He is not ill-appearing, toxic-appearing or diaphoretic.   HENT:      Head: Normocephalic and atraumatic.      Right Ear: Hearing, tympanic membrane, ear canal and external ear normal.      Left Ear: Hearing, tympanic membrane, ear canal and external ear normal.      Mouth/Throat:      Mouth: Mucous membranes are moist.      Dentition: Abnormal dentition (R upper teeth broken/caries).      Pharynx: No oropharyngeal exudate or posterior oropharyngeal erythema.   Eyes:      General: No scleral icterus.     Extraocular Movements: Extraocular movements intact.      Pupils: Pupils are equal, round, and reactive to light.      Comments: Mild exophalmos at baseline   Neck:      Thyroid: No thyromegaly.   Cardiovascular:      Rate and Rhythm: Normal rate and regular rhythm.      Heart sounds: Normal heart sounds. No murmur. No friction rub. No gallop.    Pulmonary:      Effort: Pulmonary effort is normal. No respiratory distress.      Breath sounds: Normal breath sounds. No stridor. No wheezing, rhonchi or rales.   Abdominal:      General: Bowel sounds are normal. There is no distension.      Palpations: Abdomen is soft. There is no mass.      Tenderness: There is no abdominal tenderness. There is no guarding or rebound.      Hernia: No hernia is present.   Musculoskeletal:      Right lower leg: No edema.      Left lower leg: No edema.   Lymphadenopathy:      Cervical: No cervical adenopathy. "   Neurological:      Mental Status: He is alert and oriented to person, place, and time. Mental status is at baseline.      Cranial Nerves: No cranial nerve deficit.   Psychiatric:         Mood and Affect: Mood is not anxious.         Behavior: Behavior normal.         Thought Content: Thought content normal.         Judgment: Judgment normal.         Assessment/Plan   Diagnoses and all orders for this visit:    Preventative health care (Primary)  Comments:  Well physically and cognitively  Up-to-date vaccines-Pneumovax next year  Seeing dentist recurrent caries    Essential hypertension  Assessment & Plan:  -Remains slightly borderline in the office today, which is typical for him in the setting of whitecoat elevation  -However, his home numbers are excellent so continue current medications      Diet-controlled diabetes mellitus (CMS/[facility])  Assessment & Plan:  -Congratulated on improved A1c now within goal of <7% off medication  -He will continue to focus on dietary adjustments off of the Metformin  -I encouraged him to see his ophthalmologist for diabetic eye check even though he has no symptoms once per year  -Microalbumin to be repeated next visit      Coronary artery calcification seen on CT scan  Assessment & Plan:  -Remains without symptoms  -Reassuring that his last LDL was <70 next-continue to follow since he has declined statin therapy in the past      Benign prostatic hyperplasia with lower urinary tract symptoms, symptom details unspecified  Assessment & Plan:  -No real symptoms at this time so DAMIR deferred  -PSA stable      Pure hypercholesterolemia    Other specified hypothyroidism  Assessment & Plan:  -Although he reports that he is clinically euthyroid, he has gained 4 pounds unexpectedly since last visit, which may be due to the fact that his thyroid is not being adequately covered  -Therefore, we will increase his levothyroxine dosage slightly to 137 and have him repeat his TSH in 2 months  -He will  let me know if experiencing any diarrhea, anxiety, or unexpected weight loss in the meantime    Orders:  -     TSH 3rd Generation; Future    Multiple lung nodules on CT  Assessment & Plan:  -Repeat CT scan from 6/2020 stable-repeat in 1 year next June.  Provide prescription next visit      Microalbuminuria due to type 2 diabetes mellitus (CMS/HCC)    Fatty liver disease, nonalcoholic  Assessment & Plan:  -LFTs remain normal on most recent check      Aortic ectasia, abdominal (CMS/HCC)  Assessment & Plan:  -Check ultrasound of the abdominal aorta in May-provide prescription next visit      Polyp of colon, unspecified part of colon, unspecified type  Assessment & Plan:  -Up-to-date with colonoscopy  -had 3 tubular adenomae 1 year ago-repeat in 2 years from now      Tobacco abuse  Assessment & Plan:  -Congratulated him on cutting back on his cigarettes and I encouraged him to reduce further leading up to our next visit with goal of being down to quarter pack per day by that time      Other orders  -     lisinopriL-hydrochlorothiazide (PRINZIDE,ZESTORETIC) 20-12.5 mg per tablet; Take 1 tablet by mouth once daily.  -     amLODIPine (NORVASC) 10 mg tablet; Take 1 tablet (10 mg total) by mouth once daily.  -     levothyroxine (SYNTHROID) 137 mcg tablet; Take 1 tablet (137 mcg total) by mouth daily.

## 2020-10-20 NOTE — ASSESSMENT & PLAN NOTE
-Remains slightly borderline in the office today, which is typical for him in the setting of whitecoat elevation  -However, his home numbers are excellent so continue current medications

## 2020-10-20 NOTE — ASSESSMENT & PLAN NOTE
-Although he reports that he is clinically euthyroid, he has gained 4 pounds unexpectedly since last visit, which may be due to the fact that his thyroid is not being adequately covered  -Therefore, we will increase his levothyroxine dosage slightly to 137 and have him repeat his TSH in 2 months  -He will let me know if experiencing any diarrhea, anxiety, or unexpected weight loss in the meantime

## 2020-10-20 NOTE — ASSESSMENT & PLAN NOTE
-Congratulated him on cutting back on his cigarettes and I encouraged him to reduce further leading up to our next visit with goal of being down to quarter pack per day by that time

## 2020-12-14 RX ORDER — LEVOTHYROXINE SODIUM 137 UG/1
137 TABLET ORAL
Qty: 30 TABLET | Refills: 0 | Status: SHIPPED | OUTPATIENT
Start: 2020-12-14 | End: 2021-01-28 | Stop reason: SDUPTHER

## 2020-12-14 NOTE — TELEPHONE ENCOUNTER
Medicine Refill Request    Last Office Visit: 10/20/2020  Last Telemedicine Visit: Visit date not found    Next Office Visit: Visit date not found  Next Telemedicine Visit: Visit date not found         Current Outpatient Medications:   •  amLODIPine (NORVASC) 10 mg tablet, Take 1 tablet (10 mg total) by mouth once daily., Disp: 90 tablet, Rfl: 1  •  levothyroxine (SYNTHROID) 137 mcg tablet, Take 1 tablet (137 mcg total) by mouth daily., Disp: 60 tablet, Rfl: 0  •  lisinopriL-hydrochlorothiazide (PRINZIDE,ZESTORETIC) 20-12.5 mg per tablet, Take 1 tablet by mouth once daily., Disp: 90 tablet, Rfl: 1      BP Readings from Last 3 Encounters:   10/20/20 140/76   06/19/20 138/86   05/22/20 140/79       Recent Lab results:  Lab Results   Component Value Date    CHOL 138 06/03/2020   ,   Lab Results   Component Value Date    HDL 31 (L) 06/03/2020   ,   Lab Results   Component Value Date    LDLCALC 67 06/03/2020   ,   Lab Results   Component Value Date    TRIG 202 (H) 06/03/2020        Lab Results   Component Value Date    GLUCOSE 102 (H) 10/07/2020   ,   Lab Results   Component Value Date    HGBA1C 6.7 (H) 10/07/2020         Lab Results   Component Value Date    CREATININE 1.0 10/07/2020       Lab Results   Component Value Date    TSH 4.79 10/07/2020

## 2020-12-29 ENCOUNTER — APPOINTMENT (OUTPATIENT)
Dept: LAB | Age: 65
End: 2020-12-29
Attending: FAMILY MEDICINE
Payer: COMMERCIAL

## 2020-12-29 DIAGNOSIS — E03.8 OTHER SPECIFIED HYPOTHYROIDISM: ICD-10-CM

## 2020-12-29 LAB — TSH SERPL DL<=0.05 MIU/L-ACNC: 2.16 MIU/L (ref 0.34–5.6)

## 2020-12-29 PROCEDURE — 84443 ASSAY THYROID STIM HORMONE: CPT

## 2020-12-29 PROCEDURE — 36415 COLL VENOUS BLD VENIPUNCTURE: CPT

## 2021-01-28 ENCOUNTER — OFFICE VISIT (OUTPATIENT)
Dept: PRIMARY CARE | Facility: CLINIC | Age: 66
End: 2021-01-28
Payer: COMMERCIAL

## 2021-01-28 VITALS
DIASTOLIC BLOOD PRESSURE: 72 MMHG | TEMPERATURE: 98.1 F | BODY MASS INDEX: 29.21 KG/M2 | SYSTOLIC BLOOD PRESSURE: 138 MMHG | OXYGEN SATURATION: 97 % | RESPIRATION RATE: 16 BRPM | HEIGHT: 69 IN | HEART RATE: 72 BPM | WEIGHT: 197.2 LBS

## 2021-01-28 DIAGNOSIS — E11.29 MICROALBUMINURIA DUE TO TYPE 2 DIABETES MELLITUS (CMS/HCC)  (CMS/HCC): ICD-10-CM

## 2021-01-28 DIAGNOSIS — Z72.0 TOBACCO ABUSE: ICD-10-CM

## 2021-01-28 DIAGNOSIS — E03.8 OTHER SPECIFIED HYPOTHYROIDISM: ICD-10-CM

## 2021-01-28 DIAGNOSIS — I77.811 AORTIC ECTASIA, ABDOMINAL (CMS/HCC): ICD-10-CM

## 2021-01-28 DIAGNOSIS — E11.9 DIET-CONTROLLED DIABETES MELLITUS (CMS/HCC): ICD-10-CM

## 2021-01-28 DIAGNOSIS — R80.9 MICROALBUMINURIA DUE TO TYPE 2 DIABETES MELLITUS (CMS/HCC)  (CMS/HCC): ICD-10-CM

## 2021-01-28 DIAGNOSIS — I10 ESSENTIAL HYPERTENSION: Primary | ICD-10-CM

## 2021-01-28 DIAGNOSIS — R91.8 MULTIPLE LUNG NODULES ON CT: ICD-10-CM

## 2021-01-28 LAB
ALBUMIN/CREAT UR: 52.5 UG/MG
CREAT UR-MCNC: 45.7 MG/DL
MICROALBUMIN UR-MCNC: 24 MG/L

## 2021-01-28 PROCEDURE — 99214 OFFICE O/P EST MOD 30 MIN: CPT | Performed by: FAMILY MEDICINE

## 2021-01-28 PROCEDURE — 82043 UR ALBUMIN QUANTITATIVE: CPT | Performed by: FAMILY MEDICINE

## 2021-01-28 RX ORDER — LISINOPRIL AND HYDROCHLOROTHIAZIDE 12.5; 2 MG/1; MG/1
1 TABLET ORAL
Qty: 90 TABLET | Refills: 1 | Status: SHIPPED | OUTPATIENT
Start: 2021-01-28 | End: 2021-06-30

## 2021-01-28 RX ORDER — AMLODIPINE BESYLATE 10 MG/1
10 TABLET ORAL
Qty: 90 TABLET | Refills: 1 | Status: SHIPPED | OUTPATIENT
Start: 2021-01-28 | End: 2021-07-29 | Stop reason: SDUPTHER

## 2021-01-28 RX ORDER — LEVOTHYROXINE SODIUM 137 UG/1
137 TABLET ORAL
Qty: 90 TABLET | Refills: 1 | Status: SHIPPED | OUTPATIENT
Start: 2021-01-28 | End: 2021-06-16

## 2021-01-28 ASSESSMENT — ENCOUNTER SYMPTOMS
LIGHT-HEADEDNESS: 0
SHORTNESS OF BREATH: 0
UNEXPECTED WEIGHT CHANGE: 0
FATIGUE: 0
CHEST TIGHTNESS: 0
DIZZINESS: 0
ABDOMINAL PAIN: 0
DYSPHORIC MOOD: 0

## 2021-01-28 NOTE — ASSESSMENT & PLAN NOTE
-We will check his labs before next visit to ensure continued stability of his A1c  -He will continue to focus on reduction of carbohydrates and sweets  -Check microalbumin  -Give Pneumovax next fall (1 year after Prevnar)  -I recommended COVID-19 vaccination and discussed risk/benefits, but he declines this for now-if he changes his mind, he will set it up through the MyChart once he gets the imitation

## 2021-01-28 NOTE — PROGRESS NOTES
"      Subjective      Patient ID: Rhys Campos is a 65 y.o. male.  1955      Here for blood pressure check and to review his recent blood test    Hypothyroidism  -We increased his levothyroxine in October up to 137 mcg and his TSH is now normal at 2.16  -denies unexpected weight changes, fatigue, or depression    AAA  -He is due for 1 year repeat in May  -denies abd pain    HTN  - feeling well and still hitting them gym 3-4d/wk with no cp, sob, dizziness/lh  -home bp 110's-130's/70's-80's.     -denies coughing or wheezing.       The following have been reviewed and updated as appropriate in this visit:  Tobacco  Allergies  Meds  Surg Hx  Fam Hx       Review of Systems   Constitutional: Negative for fatigue and unexpected weight change (admits to more sweets over holidays).   Eyes: Negative for visual disturbance (seeing eye dr in spring).   Respiratory: Negative for chest tightness and shortness of breath.    Cardiovascular: Negative for chest pain.   Gastrointestinal: Negative for abdominal pain.   Neurological: Negative for dizziness, syncope and light-headedness.   Psychiatric/Behavioral: Negative for dysphoric mood.       Objective     Vitals:    01/28/21 1357 01/28/21 1433   BP: 140/82 138/72   BP Location: Left upper arm Left upper arm   Patient Position: Sitting Sitting   Pulse: 72    Resp: 16    Temp: 36.7 °C (98.1 °F)    TempSrc: Oral    SpO2: 97%    Weight: 89.4 kg (197 lb 3.2 oz)    Height: 1.753 m (5' 9\")      Body mass index is 29.12 kg/m².    Physical Exam  Constitutional:       General: He is not in acute distress.     Appearance: Normal appearance. He is not ill-appearing, toxic-appearing or diaphoretic.   HENT:      Head: Normocephalic and atraumatic.   Eyes:      General: No scleral icterus.  Neck:      Thyroid: No thyroid mass, thyromegaly or thyroid tenderness.   Cardiovascular:      Rate and Rhythm: Normal rate and regular rhythm.      Heart sounds: Normal heart sounds. No murmur. " No friction rub. No gallop.    Pulmonary:      Effort: Pulmonary effort is normal. No respiratory distress.      Breath sounds: Normal breath sounds. No stridor. No wheezing, rhonchi or rales.   Abdominal:      General: Abdomen is flat. Bowel sounds are normal. There is no distension.      Palpations: Abdomen is soft. There is no mass.      Tenderness: There is no abdominal tenderness. There is no guarding or rebound.      Hernia: No hernia is present.   Musculoskeletal:      Right lower leg: No edema.      Left lower leg: No edema.   Lymphadenopathy:      Cervical: No cervical adenopathy.   Neurological:      Mental Status: He is alert.         Assessment/Plan   Diagnoses and all orders for this visit:    Essential hypertension (Primary)  Assessment & Plan:  -Home BP values are within appropriate range  -His blood pressure here today is stable from prior visits in the setting of whitecoat elevation-continue current medications and following of home values      Diet-controlled diabetes mellitus (CMS/HCC)  Assessment & Plan:  -We will check his labs before next visit to ensure continued stability of his A1c  -He will continue to focus on reduction of carbohydrates and sweets  -Check microalbumin  -Give Pneumovax next fall (1 year after Prevnar)  -I recommended COVID-19 vaccination and discussed risk/benefits, but he declines this for now-if he changes his mind, he will set it up through the [employer] once he gets the imitation    Orders:  -     Hemoglobin A1c; Future  -     Comprehensive metabolic panel; Future  -     CBC and Differential; Future  -     Lipid panel; Future  -     Microalbumin/Creatinine Ur Random    Aortic ectasia, abdominal (CMS/HCC)  Assessment & Plan:  -Ensure stability on repeat US in May for 1 year follow-up  -No palpable abnormality on abdominal exam    Orders:  -     ULTRASOUND ABDOMINAL AORTA; Future    Multiple lung nodules on CT  Assessment & Plan:  -Trend CT in June for 1 year  follow-up    Orders:  -     CT CHEST WITHOUT IV CONTRAST; Future    Other specified hypothyroidism  Assessment & Plan:  -TSH normalized on most recent labs-continue current dosage of levothyroxine    Orders:  -     TSH 3rd Generation; Future    Microalbuminuria due to type 2 diabetes mellitus (CMS/HCC)  Assessment & Plan:  -Repeat microalbumin today      Tobacco abuse  Assessment & Plan:  -continues to decline cessation options      Other orders  -     levothyroxine (SYNTHROID) 137 mcg tablet; Take 1 tablet (137 mcg total) by mouth daily.  -     lisinopriL-hydrochlorothiazide (PRINZIDE,ZESTORETIC) 20-12.5 mg per tablet; Take 1 tablet by mouth once daily.  -     amLODIPine (NORVASC) 10 mg tablet; Take 1 tablet (10 mg total) by mouth once daily.

## 2021-01-28 NOTE — ASSESSMENT & PLAN NOTE
-Home BP values are within appropriate range  -His blood pressure here today is stable from prior visits in the setting of whitecoat elevation-continue current medications and following of home values

## 2021-01-28 NOTE — ASSESSMENT & PLAN NOTE
-Ensure stability on repeat US in May for 1 year follow-up  -No palpable abnormality on abdominal exam

## 2021-05-19 ENCOUNTER — HOSPITAL ENCOUNTER (OUTPATIENT)
Dept: RADIOLOGY | Age: 66
Discharge: HOME | End: 2021-05-19
Attending: FAMILY MEDICINE
Payer: COMMERCIAL

## 2021-05-19 ENCOUNTER — APPOINTMENT (OUTPATIENT)
Dept: LAB | Age: 66
End: 2021-05-19
Attending: FAMILY MEDICINE
Payer: COMMERCIAL

## 2021-05-19 DIAGNOSIS — E11.9 DIET-CONTROLLED DIABETES MELLITUS (CMS/HCC): ICD-10-CM

## 2021-05-19 DIAGNOSIS — I77.811 AORTIC ECTASIA, ABDOMINAL (CMS/HCC): ICD-10-CM

## 2021-05-19 DIAGNOSIS — R91.8 MULTIPLE LUNG NODULES ON CT: ICD-10-CM

## 2021-05-19 DIAGNOSIS — E03.8 OTHER SPECIFIED HYPOTHYROIDISM: ICD-10-CM

## 2021-05-19 LAB
ALBUMIN SERPL-MCNC: 3.9 G/DL (ref 3.4–5)
ALP SERPL-CCNC: 48 IU/L (ref 35–126)
ALT SERPL-CCNC: 31 IU/L (ref 16–63)
ANION GAP SERPL CALC-SCNC: 13 MEQ/L (ref 3–15)
AST SERPL-CCNC: 29 IU/L (ref 15–41)
BASOPHILS # BLD: 0.03 K/UL (ref 0.01–0.1)
BASOPHILS NFR BLD: 0.4 %
BILIRUB SERPL-MCNC: 0.7 MG/DL (ref 0.3–1.2)
BUN SERPL-MCNC: 21 MG/DL (ref 8–20)
CALCIUM SERPL-MCNC: 9.6 MG/DL (ref 8.9–10.3)
CHLORIDE SERPL-SCNC: 102 MEQ/L (ref 98–109)
CHOLEST SERPL-MCNC: 163 MG/DL
CO2 SERPL-SCNC: 26 MEQ/L (ref 22–32)
CREAT SERPL-MCNC: 1.2 MG/DL (ref 0.8–1.3)
DIFFERENTIAL METHOD BLD: ABNORMAL
EOSINOPHIL # BLD: 0.33 K/UL (ref 0.04–0.54)
EOSINOPHIL NFR BLD: 4.6 %
ERYTHROCYTE [DISTWIDTH] IN BLOOD BY AUTOMATED COUNT: 12.8 % (ref 11.6–14.4)
EST. AVERAGE GLUCOSE BLD GHB EST-MCNC: 151 MG/DL
GFR SERPL CREATININE-BSD FRML MDRD: >60 ML/MIN/1.73M*2
GLUCOSE SERPL-MCNC: 130 MG/DL (ref 70–99)
HBA1C MFR BLD HPLC: 6.9 %
HCT VFR BLDCO AUTO: 41.7 % (ref 40.1–51)
HDLC SERPL-MCNC: 31 MG/DL
HDLC SERPL: 5.3 {RATIO}
HGB BLD-MCNC: 13.9 G/DL (ref 13.7–17.5)
IMM GRANULOCYTES # BLD AUTO: 0.02 K/UL (ref 0–0.08)
IMM GRANULOCYTES NFR BLD AUTO: 0.3 %
LDLC SERPL CALC-MCNC: 109 MG/DL
LYMPHOCYTES # BLD: 2.14 K/UL (ref 1.2–3.5)
LYMPHOCYTES NFR BLD: 29.9 %
MCH RBC QN AUTO: 31 PG (ref 28–33.2)
MCHC RBC AUTO-ENTMCNC: 33.3 G/DL (ref 32.2–36.5)
MCV RBC AUTO: 92.9 FL (ref 83–98)
MONOCYTES # BLD: 0.69 K/UL (ref 0.3–1)
MONOCYTES NFR BLD: 9.6 %
NEUTROPHILS # BLD: 3.95 K/UL (ref 1.7–7)
NEUTS SEG NFR BLD: 55.2 %
NONHDLC SERPL-MCNC: 132 MG/DL
NRBC BLD-RTO: 0 %
PDW BLD AUTO: 13.3 FL (ref 9.4–12.4)
PLATELET # BLD AUTO: 165 K/UL (ref 150–350)
POTASSIUM SERPL-SCNC: 4.5 MEQ/L (ref 3.6–5.1)
PROT SERPL-MCNC: 6.9 G/DL (ref 6–8.2)
RBC # BLD AUTO: 4.49 M/UL (ref 4.5–5.8)
SODIUM SERPL-SCNC: 141 MEQ/L (ref 136–144)
TRIGL SERPL-MCNC: 116 MG/DL (ref 30–149)
TSH SERPL DL<=0.05 MIU/L-ACNC: 3.11 MIU/L (ref 0.34–5.6)
WBC # BLD AUTO: 7.16 K/UL (ref 3.8–10.5)

## 2021-05-19 PROCEDURE — 36415 COLL VENOUS BLD VENIPUNCTURE: CPT

## 2021-05-19 PROCEDURE — G1004 CDSM NDSC: HCPCS

## 2021-05-19 PROCEDURE — 85025 COMPLETE CBC W/AUTO DIFF WBC: CPT

## 2021-05-19 PROCEDURE — 76775 US EXAM ABDO BACK WALL LIM: CPT

## 2021-05-19 PROCEDURE — 80061 LIPID PANEL: CPT

## 2021-05-19 PROCEDURE — 83036 HEMOGLOBIN GLYCOSYLATED A1C: CPT

## 2021-05-19 PROCEDURE — 80053 COMPREHEN METABOLIC PANEL: CPT

## 2021-05-19 PROCEDURE — 84443 ASSAY THYROID STIM HORMONE: CPT

## 2021-05-20 ENCOUNTER — TELEPHONE (OUTPATIENT)
Dept: PRIMARY CARE | Facility: CLINIC | Age: 66
End: 2021-05-20

## 2021-05-20 DIAGNOSIS — E78.00 PURE HYPERCHOLESTEROLEMIA: Primary | ICD-10-CM

## 2021-05-20 DIAGNOSIS — E11.9 DIET-CONTROLLED DIABETES MELLITUS (CMS/HCC): ICD-10-CM

## 2021-05-20 RX ORDER — ATORVASTATIN CALCIUM 10 MG/1
10 TABLET, FILM COATED ORAL DAILY
Qty: 90 TABLET | Refills: 0 | Status: SHIPPED | OUTPATIENT
Start: 2021-05-20 | End: 2021-07-29 | Stop reason: SDUPTHER

## 2021-05-20 NOTE — TELEPHONE ENCOUNTER
Spoke with patient regarding test results  -Aortic ectasia stable-repeat 1 to 2 years  -Lung nodule stable-repeat annually with low-dose CT  -LDL increased over 100 in the setting of known coronary artery atherosclerotic changes, so I recommended adding atorvastatin 10 mg once daily and he agrees to do so, with warning signs for hepatotoxicity and myopathy reviewed to call me immediately  -A1c has also increased slightly by 0.2%, so we will focus on reduction of carbohydrates, sweets, eggs, cheeses, and red meats and we will repeat his labs before he sees me in July  -He agrees to plan and no further questions

## 2021-06-16 RX ORDER — LEVOTHYROXINE SODIUM 137 UG/1
TABLET ORAL
Qty: 90 TABLET | Refills: 1 | Status: SHIPPED | OUTPATIENT
Start: 2021-06-16 | End: 2021-07-02 | Stop reason: SDUPTHER

## 2021-06-16 NOTE — TELEPHONE ENCOUNTER
Medicine Refill Request    Last Office Visit: 1/28/2021  Last Telemedicine Visit: Visit date not found    Next Office Visit: 7/29/2021  Next Telemedicine Visit: Visit date not found         Current Outpatient Medications:   •  amLODIPine (NORVASC) 10 mg tablet, Take 1 tablet (10 mg total) by mouth once daily., Disp: 90 tablet, Rfl: 1  •  atorvastatin (LIPITOR) 10 mg tablet, Take 1 tablet (10 mg total) by mouth daily., Disp: 90 tablet, Rfl: 0  •  levothyroxine (SYNTHROID) 137 mcg tablet, Take 1 tablet (137 mcg total) by mouth daily., Disp: 90 tablet, Rfl: 1  •  lisinopriL-hydrochlorothiazide (PRINZIDE,ZESTORETIC) 20-12.5 mg per tablet, Take 1 tablet by mouth once daily., Disp: 90 tablet, Rfl: 1      BP Readings from Last 3 Encounters:   01/28/21 138/72   10/20/20 140/76   06/19/20 138/86       Recent Lab results:  Lab Results   Component Value Date    CHOL 163 05/19/2021   ,   Lab Results   Component Value Date    HDL 31 (L) 05/19/2021   ,   Lab Results   Component Value Date    LDLCALC 109 (H) 05/19/2021   ,   Lab Results   Component Value Date    TRIG 116 05/19/2021        Lab Results   Component Value Date    GLUCOSE 130 (H) 05/19/2021   ,   Lab Results   Component Value Date    HGBA1C 6.9 (H) 05/19/2021         Lab Results   Component Value Date    CREATININE 1.2 05/19/2021       Lab Results   Component Value Date    TSH 3.11 05/19/2021

## 2021-07-06 RX ORDER — LEVOTHYROXINE SODIUM 137 UG/1
137 TABLET ORAL
Qty: 90 TABLET | Refills: 1 | Status: SHIPPED | OUTPATIENT
Start: 2021-07-06 | End: 2021-07-29 | Stop reason: SDUPTHER

## 2021-07-06 NOTE — TELEPHONE ENCOUNTER
Medicine Refill Request    Last Office Visit: 1/28/2021  Last Telemedicine Visit: Visit date not found    Next Office Visit: 7/29/2021  Next Telemedicine Visit: Visit date not found         Current Outpatient Medications:   •  amLODIPine (NORVASC) 10 mg tablet, Take 1 tablet (10 mg total) by mouth once daily., Disp: 90 tablet, Rfl: 1  •  atorvastatin (LIPITOR) 10 mg tablet, Take 1 tablet (10 mg total) by mouth daily., Disp: 90 tablet, Rfl: 0  •  levothyroxine (SYNTHROID) 137 mcg tablet, take 1 tablet by mouth once daily, Disp: 90 tablet, Rfl: 1  •  lisinopriL-hydrochlorothiazide (PRINZIDE,ZESTORETIC) 20-12.5 mg per tablet, take 1 tablet by mouth once daily, Disp: 90 tablet, Rfl: 0      BP Readings from Last 3 Encounters:   01/28/21 138/72   10/20/20 140/76   06/19/20 138/86       Recent Lab results:  Lab Results   Component Value Date    CHOL 163 05/19/2021   ,   Lab Results   Component Value Date    HDL 31 (L) 05/19/2021   ,   Lab Results   Component Value Date    LDLCALC 109 (H) 05/19/2021   ,   Lab Results   Component Value Date    TRIG 116 05/19/2021        Lab Results   Component Value Date    GLUCOSE 130 (H) 05/19/2021   ,   Lab Results   Component Value Date    HGBA1C 6.9 (H) 05/19/2021         Lab Results   Component Value Date    CREATININE 1.2 05/19/2021       Lab Results   Component Value Date    TSH 3.11 05/19/2021

## 2021-07-17 ENCOUNTER — APPOINTMENT (OUTPATIENT)
Dept: LAB | Age: 66
End: 2021-07-17
Attending: FAMILY MEDICINE
Payer: COMMERCIAL

## 2021-07-17 DIAGNOSIS — E11.9 DIET-CONTROLLED DIABETES MELLITUS (CMS/HCC): ICD-10-CM

## 2021-07-17 DIAGNOSIS — E78.00 PURE HYPERCHOLESTEROLEMIA: ICD-10-CM

## 2021-07-17 LAB
ALBUMIN SERPL-MCNC: 4 G/DL (ref 3.4–5)
ALP SERPL-CCNC: 46 IU/L (ref 35–126)
ALT SERPL-CCNC: 27 IU/L (ref 16–63)
AST SERPL-CCNC: 30 IU/L (ref 15–41)
BILIRUB DIRECT SERPL-MCNC: 0.1 MG/DL
BILIRUB SERPL-MCNC: 0.4 MG/DL (ref 0.3–1.2)
CHOLEST SERPL-MCNC: 159 MG/DL
EST. AVERAGE GLUCOSE BLD GHB EST-MCNC: 148 MG/DL
HBA1C MFR BLD HPLC: 6.8 %
HDLC SERPL-MCNC: 35 MG/DL
HDLC SERPL: 4.5 {RATIO}
LDLC SERPL CALC-MCNC: 108 MG/DL
NONHDLC SERPL-MCNC: 124 MG/DL
PROT SERPL-MCNC: 6.9 G/DL (ref 6–8.2)
TRIGL SERPL-MCNC: 79 MG/DL (ref 30–149)

## 2021-07-17 PROCEDURE — 36415 COLL VENOUS BLD VENIPUNCTURE: CPT

## 2021-07-17 PROCEDURE — 80061 LIPID PANEL: CPT

## 2021-07-17 PROCEDURE — 80076 HEPATIC FUNCTION PANEL: CPT

## 2021-07-17 PROCEDURE — 83036 HEMOGLOBIN GLYCOSYLATED A1C: CPT

## 2021-07-29 ENCOUNTER — OFFICE VISIT (OUTPATIENT)
Dept: PRIMARY CARE | Facility: CLINIC | Age: 66
End: 2021-07-29
Payer: COMMERCIAL

## 2021-07-29 VITALS
DIASTOLIC BLOOD PRESSURE: 74 MMHG | WEIGHT: 187 LBS | RESPIRATION RATE: 14 BRPM | OXYGEN SATURATION: 99 % | HEIGHT: 69 IN | HEART RATE: 70 BPM | BODY MASS INDEX: 27.7 KG/M2 | TEMPERATURE: 98.5 F | SYSTOLIC BLOOD PRESSURE: 138 MMHG

## 2021-07-29 DIAGNOSIS — I10 ESSENTIAL HYPERTENSION: ICD-10-CM

## 2021-07-29 DIAGNOSIS — E78.00 PURE HYPERCHOLESTEROLEMIA: ICD-10-CM

## 2021-07-29 DIAGNOSIS — Z12.5 SCREENING FOR PROSTATE CANCER: ICD-10-CM

## 2021-07-29 DIAGNOSIS — E11.9 DIET-CONTROLLED DIABETES MELLITUS (CMS/HCC): Primary | ICD-10-CM

## 2021-07-29 DIAGNOSIS — I25.10 CORONARY ARTERY CALCIFICATION SEEN ON CT SCAN: ICD-10-CM

## 2021-07-29 DIAGNOSIS — E11.29 MICROALBUMINURIA DUE TO TYPE 2 DIABETES MELLITUS (CMS/HCC)  (CMS/HCC): ICD-10-CM

## 2021-07-29 DIAGNOSIS — R91.8 MULTIPLE LUNG NODULES ON CT: ICD-10-CM

## 2021-07-29 DIAGNOSIS — R80.9 MICROALBUMINURIA DUE TO TYPE 2 DIABETES MELLITUS (CMS/HCC)  (CMS/HCC): ICD-10-CM

## 2021-07-29 DIAGNOSIS — E03.8 OTHER SPECIFIED HYPOTHYROIDISM: ICD-10-CM

## 2021-07-29 PROBLEM — D69.6 THROMBOCYTOPENIA (CMS/HCC): Status: RESOLVED | Noted: 2019-08-27 | Resolved: 2021-07-29

## 2021-07-29 PROCEDURE — 3008F BODY MASS INDEX DOCD: CPT | Performed by: FAMILY MEDICINE

## 2021-07-29 PROCEDURE — 99214 OFFICE O/P EST MOD 30 MIN: CPT | Performed by: FAMILY MEDICINE

## 2021-07-29 RX ORDER — AMLODIPINE BESYLATE 10 MG/1
10 TABLET ORAL
Qty: 90 TABLET | Refills: 1 | Status: SHIPPED | OUTPATIENT
Start: 2021-07-29 | End: 2022-01-31 | Stop reason: SDUPTHER

## 2021-07-29 RX ORDER — ATORVASTATIN CALCIUM 10 MG/1
10 TABLET, FILM COATED ORAL DAILY
Qty: 90 TABLET | Refills: 1 | Status: SHIPPED | OUTPATIENT
Start: 2021-07-29 | End: 2022-01-31 | Stop reason: SDUPTHER

## 2021-07-29 RX ORDER — LISINOPRIL AND HYDROCHLOROTHIAZIDE 12.5; 2 MG/1; MG/1
1 TABLET ORAL
Qty: 90 TABLET | Refills: 1 | Status: SHIPPED | OUTPATIENT
Start: 2021-07-29 | End: 2022-01-31 | Stop reason: SDUPTHER

## 2021-07-29 RX ORDER — LEVOTHYROXINE SODIUM 137 UG/1
137 TABLET ORAL
Qty: 90 TABLET | Refills: 1 | Status: SHIPPED | OUTPATIENT
Start: 2021-07-29 | End: 2021-12-27 | Stop reason: SDUPTHER

## 2021-07-29 ASSESSMENT — ENCOUNTER SYMPTOMS
SHORTNESS OF BREATH: 0
UNEXPECTED WEIGHT CHANGE: 0
PALPITATIONS: 0
NAUSEA: 0
ABDOMINAL PAIN: 0
CHEST TIGHTNESS: 0
VOMITING: 0
LIGHT-HEADEDNESS: 0
DIZZINESS: 0

## 2021-07-29 ASSESSMENT — PATIENT HEALTH QUESTIONNAIRE - PHQ9: SUM OF ALL RESPONSES TO PHQ9 QUESTIONS 1 & 2: 0

## 2021-07-29 NOTE — ASSESSMENT & PLAN NOTE
-Last TSH was excellent on current dosage of levothyroxine-we will trend with next labs  -He remains clinically euthyroid eye exam normal

## 2021-07-29 NOTE — ASSESSMENT & PLAN NOTE
-Stable on CT scan and repeat CT due next June- provide order next visit  -continues to decline smoking cessation options and knows the risks

## 2021-07-29 NOTE — ASSESSMENT & PLAN NOTE
-Remains very physically active with no symptoms from a cardiopulmonary perspective-commended him on the great work with this leading to intentional weight loss    -I recommended he increase his atorvastatin to 20 mg, but he states that he has admittedly been eating more fatty foods since starting the atorvastatin 10 mg and believes that he will improve his values with dietary adjustments and continuation of the lower dose  -We will therefore repeat his lipids in 6 months  -If remaining above 100 then though, we will increase to 20 mg

## 2021-07-29 NOTE — PROGRESS NOTES
"      Subjective      Patient ID: Rhys Campos is a 66 y.o. male.  1955      Here for follow-up of his recent labs    Hyperlipidemia  -I started him on 10 mg of atorvastatin in May when his LDL was 109  -He had repeat labs done on 7/17 and his LDL was only slightly better at 108.  His LFTs were normal  -home BP <993085/75-80    Aortic ectasia  -This was stable on ultrasound from May    Groundglass opacity of the right lower lobe-stable on CT chest from May    Diabetes  -Remains diet controlled with an A1c of 6.8%  -His microalbumin was 52 in January  -Has not yet scheduled with Optho-no visual changes.   -has lost 10 lbs intentionally through exercising consistently 3d/wk x60-90 mins- heavy weights with no cp, sob, dizziness, lightheadedness.       The following have been reviewed and updated as appropriate in this visit:  Tobacco  Allergies  Problems  Med Hx  Surg Hx  Fam Hx       Review of Systems   Constitutional: Negative for unexpected weight change (intentional!).   Respiratory: Negative for chest tightness and shortness of breath.    Cardiovascular: Negative for chest pain, palpitations and leg swelling.   Gastrointestinal: Negative for abdominal pain, nausea and vomiting.   Neurological: Negative for dizziness, syncope and light-headedness.       Objective     Vitals:    07/29/21 1341 07/29/21 1404   BP: 130/80 138/74   BP Location: Left upper arm Left upper arm   Patient Position: Sitting Sitting   Pulse: 70    Resp: 14    Temp: 36.9 °C (98.5 °F)    SpO2: 99%    Weight: 84.8 kg (187 lb)    Height: 1.753 m (5' 9\")      Body mass index is 27.62 kg/m².    Physical Exam  Constitutional:       General: He is not in acute distress.     Appearance: Normal appearance. He is not ill-appearing, toxic-appearing or diaphoretic.   HENT:      Head: Normocephalic and atraumatic.   Neck:      Thyroid: No thyroid mass, thyromegaly or thyroid tenderness.   Cardiovascular:      Rate and Rhythm: Normal rate and " regular rhythm.      Heart sounds: Normal heart sounds. No murmur heard.   No friction rub. No gallop.    Pulmonary:      Effort: Pulmonary effort is normal. No respiratory distress.      Breath sounds: Normal breath sounds. No stridor. No wheezing, rhonchi or rales.   Abdominal:      General: Bowel sounds are normal. There is no distension.      Palpations: Abdomen is soft. Abdomen is not rigid. There is no mass.      Tenderness: There is no abdominal tenderness. There is no guarding or rebound.      Hernia: No hernia is present.   Lymphadenopathy:      Cervical: No cervical adenopathy.   Neurological:      Mental Status: He is alert.         Assessment/Plan   Diagnoses and all orders for this visit:    Diet-controlled diabetes mellitus (CMS/HCC) (Primary)  Assessment & Plan:  -a1c remains well controlled off medication  -Give Pneumovax next visit   -Micro with next labs.  -Recommended he see ophthalmologist for annual diabetic eye exam    Orders:  -     Microalbumin/Creatinine Ur Random; Future  -     Hemoglobin A1c; Future    Microalbuminuria due to type 2 diabetes mellitus (CMS/HCC)  -     Microalbumin/Creatinine Ur Random; Future    Essential hypertension  Assessment & Plan:  -Home values remain excellent-continue current medications and attention to sodium consumption    Orders:  -     Comprehensive metabolic panel; Future  -     CBC and Differential; Future    Coronary artery calcification seen on CT scan  Assessment & Plan:  -Remains very physically active with no symptoms from a cardiopulmonary perspective-commended him on the great work with this leading to intentional weight loss    -I recommended he increase his atorvastatin to 20 mg, but he states that he has admittedly been eating more fatty foods since starting the atorvastatin 10 mg and believes that he will improve his values with dietary adjustments and continuation of the lower dose  -We will therefore repeat his lipids in 6 months  -If remaining  above 100 then though, we will increase to 20 mg    Orders:  -     Lipid panel; Future  -     Comprehensive metabolic panel; Future    Pure hypercholesterolemia  -     Lipid panel; Future  -     Comprehensive metabolic panel; Future    Other specified hypothyroidism  Assessment & Plan:  -Last TSH was excellent on current dosage of levothyroxine-we will trend with next labs  -He remains clinically euthyroid eye exam normal    Orders:  -     TSH 3rd Generation; Future    Multiple lung nodules on CT  Assessment & Plan:  -Stable on CT scan and repeat CT due next June- provide order next visit  -continues to decline smoking cessation options and knows the risks      Screening for prostate cancer  -     PSA; Future    Other orders  -     amLODIPine (NORVASC) 10 mg tablet; Take 1 tablet (10 mg total) by mouth once daily.  -     atorvastatin (LIPITOR) 10 mg tablet; Take 1 tablet (10 mg total) by mouth daily.  -     levothyroxine (SYNTHROID) 137 mcg tablet; Take 1 tablet (137 mcg total) by mouth once daily.  -     lisinopriL-hydrochlorothiazide (PRINZIDE,ZESTORETIC) 20-12.5 mg per tablet; Take 1 tablet by mouth once daily.

## 2021-07-29 NOTE — ASSESSMENT & PLAN NOTE
-a1c remains well controlled off medication  -Give Pneumovax next visit   -Micro with next labs.  -Recommended he see ophthalmologist for annual diabetic eye exam

## 2021-12-27 RX ORDER — LEVOTHYROXINE SODIUM 137 UG/1
137 TABLET ORAL
Qty: 90 TABLET | Refills: 0 | Status: SHIPPED | OUTPATIENT
Start: 2021-12-27 | End: 2022-01-31 | Stop reason: SDUPTHER

## 2021-12-27 NOTE — TELEPHONE ENCOUNTER
Medicine Refill Request    Last Office: 7/29/2021   Last Consult Visit: Visit date not found  Last Telemedicine Visit: Visit date not found    Next Appointment: 1/31/2022  Pended for refill     Current Outpatient Medications:   •  amLODIPine (NORVASC) 10 mg tablet, Take 1 tablet (10 mg total) by mouth once daily., Disp: 90 tablet, Rfl: 1  •  atorvastatin (LIPITOR) 10 mg tablet, Take 1 tablet (10 mg total) by mouth daily., Disp: 90 tablet, Rfl: 1  •  levothyroxine (SYNTHROID) 137 mcg tablet, Take 1 tablet (137 mcg total) by mouth once daily., Disp: 90 tablet, Rfl: 1  •  lisinopriL-hydrochlorothiazide (PRINZIDE,ZESTORETIC) 20-12.5 mg per tablet, Take 1 tablet by mouth once daily., Disp: 90 tablet, Rfl: 1      BP Readings from Last 3 Encounters:   07/29/21 138/74   01/28/21 138/72   10/20/20 140/76       Recent Lab results:  Lab Results   Component Value Date    CHOL 159 07/17/2021   ,   Lab Results   Component Value Date    HDL 35 (L) 07/17/2021   ,   Lab Results   Component Value Date    LDLCALC 108 (H) 07/17/2021   ,   Lab Results   Component Value Date    TRIG 79 07/17/2021        Lab Results   Component Value Date    GLUCOSE 130 (H) 05/19/2021   ,   Lab Results   Component Value Date    HGBA1C 6.8 (H) 07/17/2021         Lab Results   Component Value Date    CREATININE 1.2 05/19/2021       Lab Results   Component Value Date    TSH 3.11 05/19/2021

## 2022-01-17 ENCOUNTER — APPOINTMENT (OUTPATIENT)
Dept: LAB | Age: 67
End: 2022-01-17
Attending: FAMILY MEDICINE
Payer: COMMERCIAL

## 2022-01-17 DIAGNOSIS — E11.9 DIET-CONTROLLED DIABETES MELLITUS (CMS/HCC): ICD-10-CM

## 2022-01-17 DIAGNOSIS — I10 ESSENTIAL HYPERTENSION: ICD-10-CM

## 2022-01-17 DIAGNOSIS — R80.9 MICROALBUMINURIA DUE TO TYPE 2 DIABETES MELLITUS (CMS/HCC)  (CMS/HCC): ICD-10-CM

## 2022-01-17 DIAGNOSIS — Z12.5 SCREENING FOR PROSTATE CANCER: ICD-10-CM

## 2022-01-17 DIAGNOSIS — E78.00 PURE HYPERCHOLESTEROLEMIA: ICD-10-CM

## 2022-01-17 DIAGNOSIS — E03.8 OTHER SPECIFIED HYPOTHYROIDISM: ICD-10-CM

## 2022-01-17 DIAGNOSIS — I25.10 CORONARY ARTERY CALCIFICATION SEEN ON CT SCAN: ICD-10-CM

## 2022-01-17 DIAGNOSIS — E11.29 MICROALBUMINURIA DUE TO TYPE 2 DIABETES MELLITUS (CMS/HCC)  (CMS/HCC): ICD-10-CM

## 2022-01-17 LAB
ALBUMIN SERPL-MCNC: 3.7 G/DL (ref 3.4–5)
ALBUMIN/CREAT UR: 33.8 UG/MG
ALP SERPL-CCNC: 55 IU/L (ref 35–126)
ALT SERPL-CCNC: 28 IU/L (ref 16–63)
ANION GAP SERPL CALC-SCNC: 12 MEQ/L (ref 3–15)
AST SERPL-CCNC: 24 IU/L (ref 15–41)
BASOPHILS # BLD: 0.03 K/UL (ref 0.01–0.1)
BASOPHILS NFR BLD: 0.4 %
BILIRUB SERPL-MCNC: 0.7 MG/DL (ref 0.3–1.2)
BUN SERPL-MCNC: 13 MG/DL (ref 8–20)
CALCIUM SERPL-MCNC: 9.2 MG/DL (ref 8.9–10.3)
CHLORIDE SERPL-SCNC: 102 MEQ/L (ref 98–109)
CHOLEST SERPL-MCNC: 164 MG/DL
CO2 SERPL-SCNC: 27 MEQ/L (ref 22–32)
CREAT SERPL-MCNC: 1 MG/DL (ref 0.8–1.3)
CREAT UR-MCNC: 165.6 MG/DL
DIFFERENTIAL METHOD BLD: ABNORMAL
EOSINOPHIL # BLD: 0.19 K/UL (ref 0.04–0.54)
EOSINOPHIL NFR BLD: 2.7 %
ERYTHROCYTE [DISTWIDTH] IN BLOOD BY AUTOMATED COUNT: 12.8 % (ref 11.6–14.4)
EST. AVERAGE GLUCOSE BLD GHB EST-MCNC: 143 MG/DL
GFR SERPL CREATININE-BSD FRML MDRD: >60 ML/MIN/1.73M*2
GLUCOSE SERPL-MCNC: 103 MG/DL (ref 70–99)
HBA1C MFR BLD HPLC: 6.6 %
HCT VFR BLDCO AUTO: 43 % (ref 40.1–51)
HDLC SERPL-MCNC: 35 MG/DL
HDLC SERPL: 4.7 {RATIO}
HGB BLD-MCNC: 13.9 G/DL (ref 13.7–17.5)
IMM GRANULOCYTES # BLD AUTO: 0.02 K/UL (ref 0–0.08)
IMM GRANULOCYTES NFR BLD AUTO: 0.3 %
LDLC SERPL CALC-MCNC: 111 MG/DL
LYMPHOCYTES # BLD: 2.1 K/UL (ref 1.2–3.5)
LYMPHOCYTES NFR BLD: 30.2 %
MCH RBC QN AUTO: 30.5 PG (ref 28–33.2)
MCHC RBC AUTO-ENTMCNC: 32.3 G/DL (ref 32.2–36.5)
MCV RBC AUTO: 94.5 FL (ref 83–98)
MICROALBUMIN UR-MCNC: 56 MG/L
MONOCYTES # BLD: 0.71 K/UL (ref 0.3–1)
MONOCYTES NFR BLD: 10.2 %
NEUTROPHILS # BLD: 3.91 K/UL (ref 1.7–7)
NEUTS SEG NFR BLD: 56.2 %
NONHDLC SERPL-MCNC: 129 MG/DL
NRBC BLD-RTO: 0 %
PDW BLD AUTO: 13.7 FL (ref 9.4–12.4)
PLATELET # BLD AUTO: 162 K/UL (ref 150–350)
POTASSIUM SERPL-SCNC: 4 MEQ/L (ref 3.6–5.1)
PROT SERPL-MCNC: 7.1 G/DL (ref 6–8.2)
PSA SERPL-MCNC: 3.96 NG/ML
RBC # BLD AUTO: 4.55 M/UL (ref 4.5–5.8)
SODIUM SERPL-SCNC: 141 MEQ/L (ref 136–144)
TRIGL SERPL-MCNC: 91 MG/DL (ref 30–149)
TSH SERPL DL<=0.05 MIU/L-ACNC: 1.66 MIU/L (ref 0.34–5.6)
WBC # BLD AUTO: 6.96 K/UL (ref 3.8–10.5)

## 2022-01-17 PROCEDURE — 83036 HEMOGLOBIN GLYCOSYLATED A1C: CPT

## 2022-01-17 PROCEDURE — 84153 ASSAY OF PSA TOTAL: CPT

## 2022-01-17 PROCEDURE — 80061 LIPID PANEL: CPT

## 2022-01-17 PROCEDURE — 82570 ASSAY OF URINE CREATININE: CPT

## 2022-01-17 PROCEDURE — 80053 COMPREHEN METABOLIC PANEL: CPT

## 2022-01-17 PROCEDURE — 36415 COLL VENOUS BLD VENIPUNCTURE: CPT

## 2022-01-17 PROCEDURE — 84443 ASSAY THYROID STIM HORMONE: CPT

## 2022-01-17 PROCEDURE — 85025 COMPLETE CBC W/AUTO DIFF WBC: CPT

## 2022-01-30 PROBLEM — K85.10 GALLSTONE PANCREATITIS: Status: RESOLVED | Noted: 2020-05-21 | Resolved: 2022-01-30

## 2022-01-31 ENCOUNTER — OFFICE VISIT (OUTPATIENT)
Dept: PRIMARY CARE | Facility: CLINIC | Age: 67
End: 2022-01-31
Payer: COMMERCIAL

## 2022-01-31 VITALS
TEMPERATURE: 98.1 F | BODY MASS INDEX: 25.33 KG/M2 | HEIGHT: 72 IN | RESPIRATION RATE: 17 BRPM | OXYGEN SATURATION: 98 % | HEART RATE: 71 BPM | WEIGHT: 187 LBS | SYSTOLIC BLOOD PRESSURE: 138 MMHG | DIASTOLIC BLOOD PRESSURE: 76 MMHG

## 2022-01-31 DIAGNOSIS — I77.811 AORTIC ECTASIA, ABDOMINAL (CMS/HCC): ICD-10-CM

## 2022-01-31 DIAGNOSIS — E11.29 MICROALBUMINURIA DUE TO TYPE 2 DIABETES MELLITUS (CMS/HCC)  (CMS/HCC): ICD-10-CM

## 2022-01-31 DIAGNOSIS — Z00.00 PREVENTATIVE HEALTH CARE: Primary | ICD-10-CM

## 2022-01-31 DIAGNOSIS — I10 ESSENTIAL HYPERTENSION: ICD-10-CM

## 2022-01-31 DIAGNOSIS — E11.9 DIET-CONTROLLED DIABETES MELLITUS (CMS/HCC): ICD-10-CM

## 2022-01-31 DIAGNOSIS — R80.9 MICROALBUMINURIA DUE TO TYPE 2 DIABETES MELLITUS (CMS/HCC)  (CMS/HCC): ICD-10-CM

## 2022-01-31 DIAGNOSIS — N40.1 BENIGN PROSTATIC HYPERPLASIA WITH LOWER URINARY TRACT SYMPTOMS, SYMPTOM DETAILS UNSPECIFIED: ICD-10-CM

## 2022-01-31 DIAGNOSIS — R91.8 MULTIPLE LUNG NODULES ON CT: ICD-10-CM

## 2022-01-31 DIAGNOSIS — Z12.2 SCREENING FOR LUNG CANCER: ICD-10-CM

## 2022-01-31 DIAGNOSIS — E03.8 OTHER SPECIFIED HYPOTHYROIDISM: ICD-10-CM

## 2022-01-31 DIAGNOSIS — Z23 NEED FOR IMMUNIZATION AGAINST INFLUENZA: ICD-10-CM

## 2022-01-31 DIAGNOSIS — K63.5 POLYP OF COLON, UNSPECIFIED PART OF COLON, UNSPECIFIED TYPE: ICD-10-CM

## 2022-01-31 DIAGNOSIS — Z72.0 TOBACCO ABUSE: ICD-10-CM

## 2022-01-31 DIAGNOSIS — E78.00 PURE HYPERCHOLESTEROLEMIA: ICD-10-CM

## 2022-01-31 PROCEDURE — 90471 IMMUNIZATION ADMIN: CPT | Performed by: FAMILY MEDICINE

## 2022-01-31 PROCEDURE — 3008F BODY MASS INDEX DOCD: CPT | Performed by: FAMILY MEDICINE

## 2022-01-31 PROCEDURE — 90694 VACC AIIV4 NO PRSRV 0.5ML IM: CPT | Performed by: FAMILY MEDICINE

## 2022-01-31 PROCEDURE — 90472 IMMUNIZATION ADMIN EACH ADD: CPT | Performed by: FAMILY MEDICINE

## 2022-01-31 PROCEDURE — 3078F DIAST BP <80 MM HG: CPT | Performed by: FAMILY MEDICINE

## 2022-01-31 PROCEDURE — 90732 PPSV23 VACC 2 YRS+ SUBQ/IM: CPT | Performed by: FAMILY MEDICINE

## 2022-01-31 PROCEDURE — 3075F SYST BP GE 130 - 139MM HG: CPT | Performed by: FAMILY MEDICINE

## 2022-01-31 PROCEDURE — 99397 PER PM REEVAL EST PAT 65+ YR: CPT | Mod: 25 | Performed by: FAMILY MEDICINE

## 2022-01-31 RX ORDER — ATORVASTATIN CALCIUM 10 MG/1
10 TABLET, FILM COATED ORAL DAILY
Qty: 90 TABLET | Refills: 1 | Status: SHIPPED | OUTPATIENT
Start: 2022-01-31 | End: 2022-08-01

## 2022-01-31 RX ORDER — LISINOPRIL AND HYDROCHLOROTHIAZIDE 12.5; 2 MG/1; MG/1
1 TABLET ORAL
Qty: 90 TABLET | Refills: 1 | Status: SHIPPED | OUTPATIENT
Start: 2022-01-31 | End: 2022-09-12

## 2022-01-31 RX ORDER — AMLODIPINE BESYLATE 10 MG/1
10 TABLET ORAL
Qty: 90 TABLET | Refills: 1 | Status: SHIPPED | OUTPATIENT
Start: 2022-01-31 | End: 2022-09-24 | Stop reason: SDUPTHER

## 2022-01-31 RX ORDER — LEVOTHYROXINE SODIUM 137 UG/1
137 TABLET ORAL
Qty: 90 TABLET | Refills: 3 | Status: SHIPPED | OUTPATIENT
Start: 2022-01-31 | End: 2022-09-27 | Stop reason: SDUPTHER

## 2022-01-31 ASSESSMENT — ENCOUNTER SYMPTOMS
ANAL BLEEDING: 0
WEAKNESS: 0
NERVOUS/ANXIOUS: 0
ABDOMINAL PAIN: 0
APPETITE CHANGE: 0
NAUSEA: 0
CONSTIPATION: 0
LIGHT-HEADEDNESS: 0
CHEST TIGHTNESS: 0
UNEXPECTED WEIGHT CHANGE: 0
DIARRHEA: 0
BLOOD IN STOOL: 0
SHORTNESS OF BREATH: 0
FATIGUE: 0
DYSPHORIC MOOD: 0
CHILLS: 0
WHEEZING: 0
HEADACHES: 0
NUMBNESS: 0
VOMITING: 0
PALPITATIONS: 0
FEVER: 0
DIZZINESS: 0

## 2022-01-31 NOTE — ASSESSMENT & PLAN NOTE
-Up-to-date with microalbumin, which is slightly increased, diabetic eye exam, and we will provide Pneumovax today to ensure completely up-to-date with vaccines as he continues to decline the COVID-19 vaccination despite strong recommendation on the fact that his wife was quite sick with Covid    -He will let me know if he changes his mind about the COVID-19 vaccine  -Repeat labs in 6 months before he sees me next as his A1c is well controlled

## 2022-01-31 NOTE — PROGRESS NOTES
Subjective      Patient ID: Rhys Campos is a 66 y.o. male.  1955      Complaints: none  Changes to Medical Hx:   - home Bps: 120's-130/80s. Never >130/85.      Diet: well balanced. No sugary beverages. Red meat once/2 wks. Milk with coffee and ice cream 3d/wk. Minimal bread. Still admits to a lot of chocolate.  Exercise: No gym in 1 mo, but joining [employer]- no cp, sob, dizziness, wheezing with free weight at home.   T: 40 yrs x1 ppd, but he has reduced to half pack/day.   EtOH: denies  D: denies  FHx of Cancer: no family history of colon, breast, or prostate cancer  FHx of CVD: denies MI, CVA, stent or bypass surgery.   Lives with: wife  Work: maintenance   Colonoscopy: repeat colonoscopy 10/2019- 3 yr repeat rec. No brbpr or abd pain.   CT Lung: Stable from 6/2021-1-year follow-up due next June. No coughing, wheezing, sob     Prevnar/Pneumovax: Prevnar last yr- due pneumovax  Zosta: Due for Shingrix- he will get at pharmacy  Td/TDap: 9/2020  Flu: UTD  Wife had covid 19 and in hosp- continues to decline vaccine     CMP/LP: Most recent labs show stable a1c 6.6%. TSH 1.66, PSA 3.96 (from 3.75).  (from 108)  HepC: neg 12/3/2018         The following have been reviewed and updated as appropriate in this visit:   Tobacco  Allergies  Meds  Problems  Med Hx  Surg Hx  Fam Hx       Review of Systems   Constitutional: Negative for appetite change, chills, fatigue, fever and unexpected weight change.   HENT: Negative for hearing loss and tinnitus.    Eyes: Negative for visual disturbance (just saw Optho Fall 2021).   Respiratory: Negative for chest tightness, shortness of breath and wheezing.    Cardiovascular: Negative for chest pain, palpitations and leg swelling.   Gastrointestinal: Negative for abdominal pain, anal bleeding, blood in stool, constipation, diarrhea, nausea and vomiting.   Genitourinary:        Awakens once/night to urinate. No weak stream. No incomplete emptying.    Skin: Negative  for rash.   Neurological: Negative for dizziness, syncope, weakness, light-headedness, numbness and headaches.   Psychiatric/Behavioral: Negative for dysphoric mood. The patient is not nervous/anxious.        Objective     Vitals:    01/31/22 1440 01/31/22 1514   BP: 140/80 138/76   BP Location: Left upper arm Left upper arm   Patient Position: Sitting Sitting   Pulse: 71    Resp: 17    Temp: 36.7 °C (98.1 °F)    TempSrc: Temporal    SpO2: 98%    Weight: 84.8 kg (187 lb)    Height: 1.829 m (6')      Body mass index is 25.36 kg/m².    Physical Exam  Constitutional:       General: He is not in acute distress.     Appearance: Normal appearance. He is well-developed. He is not ill-appearing, toxic-appearing or diaphoretic.   HENT:      Head: Normocephalic and atraumatic.      Right Ear: Hearing, tympanic membrane, ear canal and external ear normal.      Left Ear: Hearing, tympanic membrane, ear canal and external ear normal.      Mouth/Throat:      Mouth: Mucous membranes are moist.      Pharynx: Oropharynx is clear.   Eyes:      General: No scleral icterus.     Extraocular Movements: Extraocular movements intact.      Pupils: Pupils are equal, round, and reactive to light.   Neck:      Thyroid: No thyromegaly.   Cardiovascular:      Rate and Rhythm: Normal rate and regular rhythm.      Heart sounds: Normal heart sounds. No murmur heard.    No friction rub. No gallop.   Pulmonary:      Effort: Pulmonary effort is normal. No respiratory distress.      Breath sounds: Normal breath sounds. No stridor. No wheezing, rhonchi or rales.   Abdominal:      General: Bowel sounds are normal. There is no distension.      Palpations: Abdomen is soft. There is no mass.      Tenderness: There is no abdominal tenderness. There is no guarding or rebound.      Hernia: No hernia is present.   Musculoskeletal:      Right lower leg: No edema.      Left lower leg: No edema.   Lymphadenopathy:      Cervical: No cervical adenopathy.    Neurological:      Mental Status: He is alert and oriented to person, place, and time. Mental status is at baseline.      Cranial Nerves: No cranial nerve deficit.   Psychiatric:         Mood and Affect: Mood normal. Mood is not anxious.         Behavior: Behavior normal.         Thought Content: Thought content normal.         Judgment: Judgment normal.         Assessment/Plan   Diagnoses and all orders for this visit:    Preventative health care (Primary)  Comments:  Healthy lifestyle practices encouraged  Rec Shingrix for 2 doses  by 2-6 mos at pharmacy    Essential hypertension  Assessment & Plan:  -Very well controlled at home-continue current medications      Pure hypercholesterolemia  Assessment & Plan:  -Remains with no symptoms from a cardiopulmonary perspective  -His LDL is 111 and I recommended increasing his atorvastatin to 20 mg from a primary preventative perspective, but he declines this and prefers to work on diet for another 6 months   -he understands we would like to see his LDL less than 100  -He will try to cut back on ice cream, milk, and cookies    Orders:  -     Lipid panel; Future    Diet-controlled diabetes mellitus (CMS/HCC)  Assessment & Plan:  -Up-to-date with microalbumin, which is slightly increased, diabetic eye exam, and we will provide Pneumovax today to ensure completely up-to-date with vaccines as he continues to decline the COVID-19 vaccination despite strong recommendation on the fact that his wife was quite sick with Covid    -He will let me know if he changes his mind about the COVID-19 vaccine  -Repeat labs in 6 months before he sees me next as his A1c is well controlled    Orders:  -     Basic metabolic panel; Future  -     Hemoglobin A1c; Future    Benign prostatic hyperplasia with lower urinary tract symptoms, symptom details unspecified  Assessment & Plan:  -His PSA is upper normal and his symptoms are unchanged  -He is actually seeing the urologist next month,  so defer DAMIR to them      Other specified hypothyroidism  Assessment & Plan:  -Remains clinically euthyroid with a normal thyroid exam and normal TSH-continue current dosage of levothyroxine and check in 1 year      Multiple lung nodules on CT  Assessment & Plan:  -Repeat low-dose CT lung screen before his next visit with me in August    Orders:  -     CT LUNG SCREENING WITHOUT IV CONTRAST; Future    Microalbuminuria due to type 2 diabetes mellitus (CMS/HCC)  Assessment & Plan:  -Continue current dosage of lisinopril for renal protection      Tobacco abuse  Assessment & Plan:  -Continues to decline cessation options and understands the risks      Aortic ectasia, abdominal (CMS/HCC)  Assessment & Plan:  -We will repeat his ultrasound before his next visit with me and if stable again, we will not need to follow this further    Orders:  -     ULTRASOUND ABDOMINAL AORTA; Future    Need for immunization against influenza    Screening for lung cancer  -     CT LUNG SCREENING WITHOUT IV CONTRAST; Future    Polyp of colon, unspecified part of colon, unspecified type  Assessment & Plan:  -He is due for his 3-year repeat colonoscopy this October so I will remind him of this next visit      Other orders  -     Pneumococcal polysaccharide vaccine 23-valent greater than or equal to 3yo subcutaneous/IM  -     Influenza vaccine Quad Adjuvanted 65 and Older (FluAd Quad)  -     atorvastatin (LIPITOR) 10 mg tablet; Take 1 tablet (10 mg total) by mouth daily.  -     amLODIPine (NORVASC) 10 mg tablet; Take 1 tablet (10 mg total) by mouth once daily.  -     levothyroxine (SYNTHROID) 137 mcg tablet; Take 1 tablet (137 mcg total) by mouth once daily.  -     lisinopriL-hydrochlorothiazide (PRINZIDE,ZESTORETIC) 20-12.5 mg per tablet; Take 1 tablet by mouth once daily.

## 2022-01-31 NOTE — ASSESSMENT & PLAN NOTE
-Remains with no symptoms from a cardiopulmonary perspective  -His LDL is 111 and I recommended increasing his atorvastatin to 20 mg from a primary preventative perspective, but he declines this and prefers to work on diet for another 6 months   -he understands we would like to see his LDL less than 100  -He will try to cut back on ice cream, milk, and cookies

## 2022-01-31 NOTE — ASSESSMENT & PLAN NOTE
-His PSA is upper normal and his symptoms are unchanged  -He is actually seeing the urologist next month, so defer DAMIR to them

## 2022-01-31 NOTE — ASSESSMENT & PLAN NOTE
-Remains clinically euthyroid with a normal thyroid exam and normal TSH-continue current dosage of levothyroxine and check in 1 year

## 2022-01-31 NOTE — ASSESSMENT & PLAN NOTE
-We will repeat his ultrasound before his next visit with me and if stable again, we will not need to follow this further   Procedure note : epidural catheter      Staff -   Anesthesiologist:  Jaleel Wray MD  Performed By: anesthesiologist  Referred By: winnie  Pre-Procedure  Performed by Jaleel Wray MD  Referred by winnie  Location: OB    Procedure Times:11/12/2020 3:58 AM and 11/12/2020 4:12 AM  Pre-Anesthestic Checklist: patient identified, IV checked, site marked, risks and benefits discussed, informed consent, monitors and equipment checked, pre-op evaluation and at physician/surgeon's request    Timeout  Correct Patient: Yes   Correct Procedure: Yes   Correct Site: Yes   Correct Laterality: Yes and N/A   Correct Position: Yes   Site Marked: Yes, N/A   .   Procedure Documentation    .    Procedure: epidural catheter, .       .  .        Assessment/Narrative  .  .  . Comments:  Pre-Procedure  Performed by Jaleel Wray MD  Location: OB.      PreAnesthestic Checklist: patient identified, IV checked, risks and benefits discussed, informed consent obtained, monitors and equipment checked, pre-op evaluation and at physician/surgeon's request.    Timeout   Correct Patient: Yes  Correct Procedure: Epidural catheter placement  Correct Site: Yes   Correct Position: Yes    Procedure Documentation  Procedure:   Epidural catheter block for Labor    Patient currently in labor and she and OBMD request a labor epidural to control her labor pains. Patient was interviewed and examined. Procedure and risks including but not limited to bleeding, infection, nerve injury, paralysis, PDPH, and inadequate block requiring intervention discussed with patient. Questions answered. This epidural is to be placed in anticipation of vaginal delivery.  She consents to the epidural procedure.  Time-out was performed.  I or my partners remain immediately available for management of any issues or complications and will monitor at appropriate intervals.  Procedure: Patient sitting. Betadine prep x 3. Sterile drape applied.  Mask and gloves  used.  Lidocaine 1%  local infiltration at L 3-4.  17 G. Tuohy needle at L3-4 by loss of resistance into epidural space.  No CSF, paresthesia or blood. 1.5 % Lidocaine with 1:200,000 Epinephrine 5cc test dose. Epidural catheter inserted w/o resistance to 5 cm in epidural space. Then 0.25% bupivicaine 10 cc with NS 5 cc.  Aspiration negative for blood and CSF.   Negative for neuro change, paresthesia or symptoms of intravascular injection or intrathecal injection.  Infusion orders written and infusion started.    Jaleel Wray MD

## 2022-06-20 ENCOUNTER — TELEPHONE (OUTPATIENT)
Dept: PULMONOLOGY | Facility: HOSPITAL | Age: 67
End: 2022-06-20
Payer: COMMERCIAL

## 2022-06-20 VITALS — WEIGHT: 187 LBS | BODY MASS INDEX: 29.35 KG/M2 | HEIGHT: 67 IN

## 2022-06-20 DIAGNOSIS — F17.210 CIGARETTE SMOKER: Primary | ICD-10-CM

## 2022-06-20 DIAGNOSIS — Z12.2 SCREENING FOR MALIGNANT NEOPLASM OF RESPIRATORY ORGAN: ICD-10-CM

## 2022-07-11 ENCOUNTER — APPOINTMENT (OUTPATIENT)
Dept: LAB | Age: 67
End: 2022-07-11
Attending: FAMILY MEDICINE
Payer: COMMERCIAL

## 2022-07-11 DIAGNOSIS — E78.00 PURE HYPERCHOLESTEROLEMIA: ICD-10-CM

## 2022-07-11 DIAGNOSIS — E11.9 DIET-CONTROLLED DIABETES MELLITUS (CMS/HCC): ICD-10-CM

## 2022-07-11 LAB
ANION GAP SERPL CALC-SCNC: 9 MEQ/L (ref 3–15)
BUN SERPL-MCNC: 15 MG/DL (ref 8–20)
CALCIUM SERPL-MCNC: 9.3 MG/DL (ref 8.9–10.3)
CHLORIDE SERPL-SCNC: 104 MEQ/L (ref 98–109)
CHOLEST SERPL-MCNC: 166 MG/DL
CO2 SERPL-SCNC: 27 MEQ/L (ref 22–32)
CREAT SERPL-MCNC: 1 MG/DL (ref 0.8–1.3)
EST. AVERAGE GLUCOSE BLD GHB EST-MCNC: 148 MG/DL
GFR SERPL CREATININE-BSD FRML MDRD: >60 ML/MIN/1.73M*2
GLUCOSE SERPL-MCNC: 102 MG/DL (ref 70–99)
HBA1C MFR BLD HPLC: 6.8 %
HDLC SERPL-MCNC: 33 MG/DL
HDLC SERPL: 5 {RATIO}
LDLC SERPL CALC-MCNC: 113 MG/DL
NONHDLC SERPL-MCNC: 133 MG/DL
POTASSIUM SERPL-SCNC: 4.4 MEQ/L (ref 3.6–5.1)
SODIUM SERPL-SCNC: 140 MEQ/L (ref 136–144)
TRIGL SERPL-MCNC: 98 MG/DL (ref 30–149)

## 2022-07-11 PROCEDURE — 83036 HEMOGLOBIN GLYCOSYLATED A1C: CPT

## 2022-07-11 PROCEDURE — 80061 LIPID PANEL: CPT

## 2022-07-11 PROCEDURE — 80048 BASIC METABOLIC PNL TOTAL CA: CPT

## 2022-07-11 PROCEDURE — 36415 COLL VENOUS BLD VENIPUNCTURE: CPT

## 2022-07-18 ENCOUNTER — HOSPITAL ENCOUNTER (OUTPATIENT)
Dept: RADIOLOGY | Age: 67
Discharge: HOME | End: 2022-07-18
Attending: FAMILY MEDICINE
Payer: COMMERCIAL

## 2022-07-18 DIAGNOSIS — Z12.2 SCREENING FOR MALIGNANT NEOPLASM OF RESPIRATORY ORGAN: ICD-10-CM

## 2022-07-18 DIAGNOSIS — I77.811 AORTIC ECTASIA, ABDOMINAL (CMS/HCC): ICD-10-CM

## 2022-07-18 DIAGNOSIS — F17.210 CIGARETTE SMOKER: ICD-10-CM

## 2022-07-18 PROCEDURE — 71271 CT THORAX LUNG CANCER SCR C-: CPT | Mod: ME

## 2022-07-18 PROCEDURE — 76775 US EXAM ABDO BACK WALL LIM: CPT

## 2022-08-01 ENCOUNTER — OFFICE VISIT (OUTPATIENT)
Dept: PRIMARY CARE | Facility: CLINIC | Age: 67
End: 2022-08-01
Payer: COMMERCIAL

## 2022-08-01 VITALS
OXYGEN SATURATION: 99 % | DIASTOLIC BLOOD PRESSURE: 68 MMHG | HEART RATE: 83 BPM | BODY MASS INDEX: 27.4 KG/M2 | SYSTOLIC BLOOD PRESSURE: 142 MMHG | WEIGHT: 185 LBS | HEIGHT: 69 IN | RESPIRATION RATE: 17 BRPM | TEMPERATURE: 98.2 F

## 2022-08-01 DIAGNOSIS — Z72.0 TOBACCO ABUSE: ICD-10-CM

## 2022-08-01 DIAGNOSIS — I25.10 CORONARY ARTERY CALCIFICATION SEEN ON CT SCAN: ICD-10-CM

## 2022-08-01 DIAGNOSIS — N40.1 BENIGN PROSTATIC HYPERPLASIA WITH LOWER URINARY TRACT SYMPTOMS, SYMPTOM DETAILS UNSPECIFIED: ICD-10-CM

## 2022-08-01 DIAGNOSIS — I77.811 AORTIC ECTASIA, ABDOMINAL (CMS/HCC): ICD-10-CM

## 2022-08-01 DIAGNOSIS — R91.8 MULTIPLE LUNG NODULES ON CT: ICD-10-CM

## 2022-08-01 DIAGNOSIS — K76.0 FATTY LIVER DISEASE, NONALCOHOLIC: ICD-10-CM

## 2022-08-01 DIAGNOSIS — K63.5 POLYP OF COLON, UNSPECIFIED PART OF COLON, UNSPECIFIED TYPE: ICD-10-CM

## 2022-08-01 DIAGNOSIS — E78.00 PURE HYPERCHOLESTEROLEMIA: ICD-10-CM

## 2022-08-01 DIAGNOSIS — E11.9 DIET-CONTROLLED DIABETES MELLITUS (CMS/HCC): ICD-10-CM

## 2022-08-01 DIAGNOSIS — I10 ESSENTIAL HYPERTENSION: Primary | ICD-10-CM

## 2022-08-01 PROCEDURE — 99214 OFFICE O/P EST MOD 30 MIN: CPT | Performed by: FAMILY MEDICINE

## 2022-08-01 PROCEDURE — 3008F BODY MASS INDEX DOCD: CPT | Performed by: FAMILY MEDICINE

## 2022-08-01 PROCEDURE — 3078F DIAST BP <80 MM HG: CPT | Performed by: FAMILY MEDICINE

## 2022-08-01 PROCEDURE — 3077F SYST BP >= 140 MM HG: CPT | Performed by: FAMILY MEDICINE

## 2022-08-01 RX ORDER — ATORVASTATIN CALCIUM 20 MG/1
20 TABLET, FILM COATED ORAL DAILY
Qty: 90 TABLET | Refills: 0 | Status: SHIPPED | OUTPATIENT
Start: 2022-08-01 | End: 2022-08-24

## 2022-08-01 RX ORDER — TAMSULOSIN HYDROCHLORIDE 0.4 MG/1
0.4 CAPSULE ORAL NIGHTLY
COMMUNITY
Start: 2022-07-11 | End: 2024-01-16

## 2022-08-01 ASSESSMENT — ENCOUNTER SYMPTOMS
ABDOMINAL PAIN: 0
UNEXPECTED WEIGHT CHANGE: 0
CHEST TIGHTNESS: 0
PALPITATIONS: 0
SHORTNESS OF BREATH: 0
LIGHT-HEADEDNESS: 0
DIZZINESS: 0

## 2022-08-01 NOTE — ASSESSMENT & PLAN NOTE
- Remaining well controlled and he will keep up the good work from a dietary perspective  -Remain off medication  -He continues to decline the COVID-19 vaccination despite the fact that his wife had a prolonged hospitalization with this and he sees the potential severity and risks

## 2022-08-01 NOTE — ASSESSMENT & PLAN NOTE
- He declines cessation, but he does agree to cut down on the number of cigarettes  -He understands the increased risk of aneurysm, MI, CVA, and cancer, but still refuses to discuss cessation options

## 2022-08-01 NOTE — ASSESSMENT & PLAN NOTE
- Stable GG nodule in RLL on low-dose CT lung screen from last month-repeat in 1 year and again stressed the importance of smoking cessation but he declines

## 2022-08-01 NOTE — PROGRESS NOTES
"      Subjective      Patient ID: Rhys Campos is a 67 y.o. male.  1955      Rhys is here for his diabetes follow-up and to review his imaging studies    Diabetes  -His microalbumin was 33.8  -His FBG was 102 and his A1c was 6.8% on 7/11/2022  -Eye doctor: He last saw eye doctor in November 2021 and was recommended a 1 year follow-up  -Has eliminated jelly and peanut butter  -Using wheat bread only   -Cut out iced cream    Hyperlipidemia  -His LDL increased from 111 up to 113  -Ultrasound showed ectasia of the abdominal aorta at 2.6 cm and recent CT of the chest showed moderate coronary artery calcifications and thoracic aortic aneurysm  -Denies any chest pain, dyspnea, lightheadedness, dizziness, or palpitations with his activity at the gym 3-4d/wk  -Has cut back on milk/dairy  -cheeseburger once/wk, but mostly chicken    Due for repeat colonoscopy in October      The following have been reviewed and updated as appropriate in this visit:   Tobacco  Allergies  Meds  Med Hx         Review of Systems   Constitutional: Negative for unexpected weight change.   Respiratory: Negative for chest tightness and shortness of breath.    Cardiovascular: Negative for chest pain, palpitations and leg swelling.   Gastrointestinal: Negative for abdominal pain.   Neurological: Negative for dizziness, syncope and light-headedness.       Objective     Vitals:    08/01/22 1324 08/01/22 1354 08/01/22 1359   BP: 130/80 (!) 144/78 (!) 142/68   BP Location: Left upper arm Left upper arm Left upper arm   Patient Position: Sitting Sitting Sitting   Pulse: 83     Resp: 17     Temp: 36.8 °C (98.2 °F)     TempSrc: Temporal     SpO2: 99%     Weight: 83.9 kg (185 lb)     Height: 1.753 m (5' 9\")       Body mass index is 27.32 kg/m².    Physical Exam  Constitutional:       General: He is not in acute distress.     Appearance: Normal appearance. He is normal weight. He is not ill-appearing, toxic-appearing or diaphoretic.   HENT:      " Head: Normocephalic and atraumatic.   Neck:      Thyroid: No thyroid mass, thyromegaly or thyroid tenderness.   Cardiovascular:      Rate and Rhythm: Normal rate and regular rhythm.      Heart sounds: Normal heart sounds. No murmur heard.    No friction rub. No gallop.   Pulmonary:      Effort: Pulmonary effort is normal. No respiratory distress.      Breath sounds: Normal breath sounds. No stridor. No wheezing, rhonchi or rales.   Abdominal:      General: Bowel sounds are normal. There is no distension.      Palpations: Abdomen is soft. There is no mass.      Tenderness: There is no abdominal tenderness. There is no guarding or rebound.      Hernia: No hernia is present.   Musculoskeletal:      Right lower leg: No edema.      Left lower leg: No edema.   Lymphadenopathy:      Cervical: No cervical adenopathy.   Neurological:      Mental Status: He is alert and oriented to person, place, and time. Mental status is at baseline.         Assessment/Plan   Diagnoses and all orders for this visit:    Essential hypertension (Primary)  Assessment & Plan:  - His home values are within goal of <140/90 and his office value today is within his typical range in the setting of whitecoat hypertension, so continue current medication and check blood pressure twice weekly, calling me if above 140/90    -He will write down his numbers and bring with him next visit    Orders:  -     TSH 3rd Generation; Future  -     CBC and Differential; Future    Diet-controlled diabetes mellitus (CMS/HCC)  Assessment & Plan:  - Remaining well controlled and he will keep up the good work from a dietary perspective  -Remain off medication  -He continues to decline the COVID-19 vaccination     Orders:  -     Hemoglobin A1c; Future    Fatty liver disease, nonalcoholic  Assessment & Plan:  - Recent LFTs completely normal-trend every 6 to 12 months      Pure hypercholesterolemia  -     Lipid panel; Future  -     Comprehensive metabolic panel;  Future    Coronary artery calcification seen on CT scan  Assessment & Plan:  - Remains very physically active with no symptoms from a cardiopulmonary perspective  -I would like to see his LDL less than 100 and ideally closer to 70, so we will increase his atorvastatin to 20 mg and he will repeat his labs in 2 months  -He will let me know if any body aches or abdominal pain after the higher dosage      Aortic ectasia, abdominal (CMS/HCC)  Assessment & Plan:  -We discussed the need to quit smoking-repeat imaging in 12 mo      Multiple lung nodules on CT  Assessment & Plan:  - Stable GG nodule in RLL on low-dose CT lung screen from last month-repeat in 1 year and again stressed the importance of smoking cessation but he declines      Polyp of colon, unspecified part of colon, unspecified type  Assessment & Plan:  - We discussed that he had tubular adenoma x3 on colonoscopy from 2019 and then a 3-year repeat was recommended  -We discussed that these increase the risk of developing colon cancer, so he will reach out to Dr. Jaramillo to schedule his next colonoscopy      Benign prostatic hyperplasia with lower urinary tract symptoms, symptom details unspecified  Assessment & Plan:  - He saw his urologist last month and was recommended checking a PSA before next visit with her in October since his PSA was borderline at 3.96  -He wanted me to provide the prescription for this, so I included this with his next labs in 2 months and he will see her thereafter    Orders:  -     PSA; Future    Tobacco abuse  Assessment & Plan:  - He declines cessation, but he does agree to cut down on the number of cigarettes      Other orders  -     atorvastatin (LIPITOR) 20 mg tablet; Take 1 tablet (20 mg total) by mouth daily.

## 2022-08-01 NOTE — ASSESSMENT & PLAN NOTE
- We discussed that he had tubular adenoma x3 on colonoscopy from 2019 and then a 3-year repeat was recommended  -We discussed that these increase the risk of developing colon cancer, so he will reach out to Dr. Jaramillo to schedule his next colonoscopy

## 2022-08-01 NOTE — ASSESSMENT & PLAN NOTE
- He saw his urologist last month and was recommended checking a PSA before next visit with her in October since his PSA was borderline at 3.96  -He wanted me to provide the prescription for this, so I included this with his next labs in 2 months and he will see her thereafter

## 2022-08-01 NOTE — ASSESSMENT & PLAN NOTE
- His home values are within goal of <140/90 and his office value today is within his typical range in the setting of whitecoat hypertension, so continue current medication and check blood pressure twice weekly, calling me if above 140/90    -He will write down his numbers and bring with him next visit

## 2022-08-01 NOTE — ASSESSMENT & PLAN NOTE
- Remains very physically active with no symptoms from a cardiopulmonary perspective  -I would like to see his LDL less than 100 and ideally closer to 70, so we will increase his atorvastatin to 20 mg and he will repeat his labs in 2 months  -He will let me know if any body aches or abdominal pain after the higher dosage

## 2022-08-25 ENCOUNTER — HOSPITAL ENCOUNTER (OUTPATIENT)
Facility: CLINIC | Age: 67
Discharge: HOME | End: 2022-08-25
Attending: EMERGENCY MEDICINE
Payer: COMMERCIAL

## 2022-08-25 VITALS — TEMPERATURE: 97.8 F | HEART RATE: 97 BPM | RESPIRATION RATE: 12 BRPM | OXYGEN SATURATION: 98 %

## 2022-08-25 DIAGNOSIS — J02.9 ACUTE PHARYNGITIS, UNSPECIFIED ETIOLOGY: Primary | ICD-10-CM

## 2022-08-25 LAB
EXPIRATION DATE: NORMAL
Lab: NORMAL
POCT MANUFACTURER: NORMAL
S PYO AG THROAT QL: NEGATIVE

## 2022-08-25 PROCEDURE — 99213 OFFICE O/P EST LOW 20 MIN: CPT | Performed by: NURSE PRACTITIONER

## 2022-08-25 PROCEDURE — 87880 STREP A ASSAY W/OPTIC: CPT | Performed by: NURSE PRACTITIONER

## 2022-08-25 PROCEDURE — S9083 URGENT CARE CENTER GLOBAL: HCPCS | Performed by: NURSE PRACTITIONER

## 2022-08-25 RX ORDER — LEVOTHYROXINE SODIUM 125 UG/1
1 TABLET ORAL
COMMUNITY
End: 2022-09-28

## 2022-08-25 RX ORDER — AMLODIPINE BESYLATE 10 MG/1
TABLET ORAL
COMMUNITY
End: 2022-12-27

## 2022-08-25 RX ORDER — LISINOPRIL AND HYDROCHLOROTHIAZIDE 12.5; 2 MG/1; MG/1
TABLET ORAL
COMMUNITY
End: 2022-12-31 | Stop reason: SDUPTHER

## 2022-08-25 ASSESSMENT — ENCOUNTER SYMPTOMS
SORE THROAT: 1
FATIGUE: 1
SEIZURES: 0
ARTHRALGIAS: 0
DYSURIA: 0
SHORTNESS OF BREATH: 0
ABDOMINAL PAIN: 0
HEMATURIA: 0
COLOR CHANGE: 0
BACK PAIN: 0
CHILLS: 0
EYE PAIN: 0
VOMITING: 0
FEVER: 0
PALPITATIONS: 0
COUGH: 0

## 2022-08-25 NOTE — ED ATTESTATION NOTE
The patient was evaluated and managed by the nurse practitioner.  I was present in the office and provided direct supervision.  I have reviewed and agree with the diagnosis and treatment plan.     Shruthi Altman,   08/25/22 1818

## 2022-08-25 NOTE — DISCHARGE INSTRUCTIONS
You have been diagnosed with viral pharyngitis (sore throat).    Rapid strep was negative.  Antibiotics are not indicated at this time.    I recommend the following supportive care:    Plenty of rest, fluids, and OTC throat spray as needed (Chloraseptic is one example)  Try warm salt water gargles for additional relief  Motrin or Tylenol every 4-6 hours as needed for fever/pain  If your symptoms are not improving within 48 hours please call your Primary Care provider to discuss follow up care.

## 2022-08-25 NOTE — ED PROVIDER NOTES
Emergency Medicine Note  HPI   HISTORY OF PRESENT ILLNESS     Patient presents with sore throat and fatigue x 4 days.   Denies fevers, chills, cough or shortness of breath.   He has not been vaccinated for COVID.   He had a negative home COVID test and declines one here today.             Patient History   PAST HISTORY     Reviewed from Nursing Triage:         Past Medical History:   Diagnosis Date   • BPH (benign prostatic hyperplasia)    • Graves disease    • Hypertension    • Lipid disorder    • Male erectile dysfunction    • Type 2 diabetes mellitus (CMS/HCC)        Past Surgical History:   Procedure Laterality Date   • CHOLECYSTECTOMY  2020   • COLONOSCOPY     • EYE SURGERY Bilateral     from Graves   • SINUS SURGERY         Family History   Problem Relation Age of Onset   • Other Biological Mother         natural causes -  at age 75   • Hypertension Biological Father    • Other Biological Father         natural causes -  at age 78   • No Known Problems Biological Sister        Social History     Tobacco Use   • Smoking status: Current Every Day Smoker     Packs/day: 0.75     Years: 40.00     Pack years: 30.00     Types: Cigarettes     Start date:    • Smokeless tobacco: Current User   Substance Use Topics   • Alcohol use: No   • Drug use: No         Review of Systems   REVIEW OF SYSTEMS     Review of Systems   Constitutional: Positive for fatigue. Negative for chills and fever.   HENT: Positive for sore throat. Negative for ear pain.    Eyes: Negative for pain and visual disturbance.   Respiratory: Negative for cough and shortness of breath.    Cardiovascular: Negative for chest pain and palpitations.   Gastrointestinal: Negative for abdominal pain and vomiting.   Genitourinary: Negative for dysuria and hematuria.   Musculoskeletal: Negative for arthralgias and back pain.   Skin: Negative for color change and rash.   Neurological: Negative for seizures and syncope.   All other systems reviewed  and are negative.        VITALS     ED Vitals    Date/Time Temp Pulse Resp BP SpO2 [facility]   08/25/22 1741 36.6 °C (97.8 °F) 97 12 -- 98 % MM                       Physical Exam   PHYSICAL EXAM     Physical Exam  Vitals reviewed.   Constitutional:       General: He is not in acute distress.     Appearance: Normal appearance. He is not ill-appearing, toxic-appearing or diaphoretic.   HENT:      Head: Normocephalic and atraumatic.      Mouth/Throat:      Mouth: Mucous membranes are moist.      Pharynx: Uvula midline. Pharyngeal swelling, posterior oropharyngeal erythema and uvula swelling present. No oropharyngeal exudate.      Tonsils: No tonsillar exudate or tonsillar abscesses.   Cardiovascular:      Rate and Rhythm: Normal rate.   Pulmonary:      Effort: Pulmonary effort is normal. No respiratory distress.   Musculoskeletal:         General: Normal range of motion.      Cervical back: Normal range of motion. No rigidity.   Skin:     General: Skin is warm and dry.   Neurological:      General: No focal deficit present.      Mental Status: He is alert and oriented to person, place, and time.   Psychiatric:         Mood and Affect: Mood normal.         Behavior: Behavior normal.           PROCEDURES     Procedures     DATA     Results     None              No orders to display       Scoring tools                                  ED Course & MDM   MDM / ED COURSE / CLINICAL IMPRESSION / DISPO     MDM  Number of Diagnoses or Management Options  Acute pharyngitis, unspecified etiology  Diagnosis management comments: Rapid strep was negative.   Recommended COVID swab but patient declined.   Recommended over the counter supportive care as needed.  Instructed to follow up with PCP or ED for new or worsening symptoms.            Clinical Impression      Acute pharyngitis, unspecified etiology     Disposition  Discharge         Radha Vivas CRNP  08/25/22 3667

## 2022-09-12 DIAGNOSIS — I10 ESSENTIAL HYPERTENSION: Primary | ICD-10-CM

## 2022-09-12 RX ORDER — LISINOPRIL AND HYDROCHLOROTHIAZIDE 12.5; 2 MG/1; MG/1
1 TABLET ORAL
Qty: 90 TABLET | Refills: 1 | Status: SHIPPED | OUTPATIENT
Start: 2022-09-12 | End: 2022-11-01

## 2022-09-12 NOTE — TELEPHONE ENCOUNTER
Medicine Refill Request    Last Office: 8/1/2022   Last Consult Visit: Visit date not found  Last Telemedicine Visit: Visit date not found    Next Appointment: 11/1/2022      Current Outpatient Medications:     amLODIPine (NORVASC) 10 mg tablet, Take 1 tablet (10 mg total) by mouth once daily., Disp: 90 tablet, Rfl: 1    amLODIPine (NORVASC) 10 mg tablet, daily., Disp: , Rfl:     levothyroxine (SYNTHROID) 125 mcg tablet, Take 1 tablet by mouth daily before breakfast., Disp: , Rfl:     levothyroxine (SYNTHROID) 137 mcg tablet, Take 1 tablet (137 mcg total) by mouth once daily., Disp: 90 tablet, Rfl: 3    lisinopriL-hydrochlorothiazide (PRINZIDE,ZESTORETIC) 20-12.5 mg per tablet, Take 1 tablet by mouth once daily., Disp: 90 tablet, Rfl: 1    lisinopriL-hydrochlorothiazide (PRINZIDE,ZESTORETIC) 20-12.5 mg per tablet, daily., Disp: , Rfl:     rosuvastatin (CRESTOR) 10 mg tablet, Take 1 tablet (10 mg total) by mouth daily., Disp: 90 tablet, Rfl: 0    tamsulosin (FLOMAX) 0.4 mg capsule, Take 0.4 mg by mouth nightly., Disp: , Rfl:       BP Readings from Last 3 Encounters:   08/01/22 (!) 142/68   01/31/22 138/76   07/29/21 138/74       Recent Lab results:  Lab Results   Component Value Date    CHOL 166 07/11/2022   ,   Lab Results   Component Value Date    HDL 33 (L) 07/11/2022   ,   Lab Results   Component Value Date    LDLCALC 113 (H) 07/11/2022   ,   Lab Results   Component Value Date    TRIG 98 07/11/2022        Lab Results   Component Value Date    GLUCOSE 102 (H) 07/11/2022   ,   Lab Results   Component Value Date    HGBA1C 6.8 (H) 07/11/2022         Lab Results   Component Value Date    CREATININE 1.0 07/11/2022       Lab Results   Component Value Date    TSH 1.66 01/17/2022

## 2022-09-27 DIAGNOSIS — I10 ESSENTIAL HYPERTENSION: ICD-10-CM

## 2022-09-27 DIAGNOSIS — E03.8 OTHER SPECIFIED HYPOTHYROIDISM: Primary | ICD-10-CM

## 2022-09-27 RX ORDER — AMLODIPINE BESYLATE 10 MG/1
10 TABLET ORAL
Qty: 90 TABLET | Refills: 0 | Status: CANCELLED | OUTPATIENT
Start: 2022-09-27

## 2022-09-28 RX ORDER — LEVOTHYROXINE SODIUM 137 UG/1
137 TABLET ORAL
Qty: 90 TABLET | Refills: 1 | Status: SHIPPED | OUTPATIENT
Start: 2022-09-28 | End: 2023-05-02 | Stop reason: SDUPTHER

## 2022-09-28 NOTE — TELEPHONE ENCOUNTER
Medicine Refill Request    Last Office: 8/1/2022   Last Consult Visit: Visit date not found  Last Telemedicine Visit: Visit date not found    Next Appointment: 11/1/2022      Current Outpatient Medications:     amLODIPine (NORVASC) 10 mg tablet, daily., Disp: , Rfl:     amLODIPine (NORVASC) 10 mg tablet, Take 1 tablet (10 mg total) by mouth once daily., Disp: 90 tablet, Rfl: 0    levothyroxine (SYNTHROID) 125 mcg tablet, Take 1 tablet by mouth daily before breakfast., Disp: , Rfl:     levothyroxine (SYNTHROID) 137 mcg tablet, Take 1 tablet (137 mcg total) by mouth once daily., Disp: 90 tablet, Rfl: 3    lisinopriL-hydrochlorothiazide (PRINZIDE,ZESTORETIC) 20-12.5 mg per tablet, daily., Disp: , Rfl:     lisinopriL-hydrochlorothiazide (PRINZIDE,ZESTORETIC) 20-12.5 mg per tablet, take 1 tablet by mouth once daily, Disp: 90 tablet, Rfl: 1    rosuvastatin (CRESTOR) 10 mg tablet, Take 1 tablet (10 mg total) by mouth daily., Disp: 90 tablet, Rfl: 0    tamsulosin (FLOMAX) 0.4 mg capsule, Take 0.4 mg by mouth nightly., Disp: , Rfl:       BP Readings from Last 3 Encounters:   08/01/22 (!) 142/68   01/31/22 138/76   07/29/21 138/74       Recent Lab results:  Lab Results   Component Value Date    CHOL 166 07/11/2022   ,   Lab Results   Component Value Date    HDL 33 (L) 07/11/2022   ,   Lab Results   Component Value Date    LDLCALC 113 (H) 07/11/2022   ,   Lab Results   Component Value Date    TRIG 98 07/11/2022        Lab Results   Component Value Date    GLUCOSE 102 (H) 07/11/2022   ,   Lab Results   Component Value Date    HGBA1C 6.8 (H) 07/11/2022         Lab Results   Component Value Date    CREATININE 1.0 07/11/2022       Lab Results   Component Value Date    TSH 1.66 01/17/2022

## 2022-10-06 ENCOUNTER — APPOINTMENT (OUTPATIENT)
Dept: LAB | Age: 67
End: 2022-10-06
Attending: FAMILY MEDICINE
Payer: COMMERCIAL

## 2022-10-06 DIAGNOSIS — I10 ESSENTIAL HYPERTENSION: ICD-10-CM

## 2022-10-06 DIAGNOSIS — E78.00 PURE HYPERCHOLESTEROLEMIA: ICD-10-CM

## 2022-10-06 DIAGNOSIS — E11.9 DIET-CONTROLLED DIABETES MELLITUS (CMS/HCC): ICD-10-CM

## 2022-10-06 DIAGNOSIS — N40.1 BENIGN PROSTATIC HYPERPLASIA WITH LOWER URINARY TRACT SYMPTOMS, SYMPTOM DETAILS UNSPECIFIED: ICD-10-CM

## 2022-10-06 LAB
ALBUMIN SERPL-MCNC: 4.1 G/DL (ref 3.4–5)
ALP SERPL-CCNC: 57 IU/L (ref 35–126)
ALT SERPL-CCNC: 26 IU/L (ref 16–63)
ANION GAP SERPL CALC-SCNC: 10 MEQ/L (ref 3–15)
AST SERPL-CCNC: 29 IU/L (ref 15–41)
BASOPHILS # BLD: 0.04 K/UL (ref 0.01–0.1)
BASOPHILS NFR BLD: 0.6 %
BILIRUB SERPL-MCNC: 0.8 MG/DL (ref 0.3–1.2)
BUN SERPL-MCNC: 17 MG/DL (ref 8–20)
CALCIUM SERPL-MCNC: 9.4 MG/DL (ref 8.9–10.3)
CHLORIDE SERPL-SCNC: 103 MEQ/L (ref 98–109)
CHOLEST SERPL-MCNC: 156 MG/DL
CO2 SERPL-SCNC: 26 MEQ/L (ref 22–32)
CREAT SERPL-MCNC: 1 MG/DL (ref 0.8–1.3)
DIFFERENTIAL METHOD BLD: ABNORMAL
EOSINOPHIL # BLD: 0.25 K/UL (ref 0.04–0.54)
EOSINOPHIL NFR BLD: 3.7 %
ERYTHROCYTE [DISTWIDTH] IN BLOOD BY AUTOMATED COUNT: 12.8 % (ref 11.6–14.4)
EST. AVERAGE GLUCOSE BLD GHB EST-MCNC: 140 MG/DL
GFR SERPL CREATININE-BSD FRML MDRD: >60 ML/MIN/1.73M*2
GLUCOSE SERPL-MCNC: 124 MG/DL (ref 70–99)
HBA1C MFR BLD HPLC: 6.5 %
HCT VFR BLDCO AUTO: 41.4 % (ref 40.1–51)
HDLC SERPL-MCNC: 34 MG/DL
HDLC SERPL: 4.6 {RATIO}
HGB BLD-MCNC: 13.7 G/DL (ref 13.7–17.5)
IMM GRANULOCYTES # BLD AUTO: 0.02 K/UL (ref 0–0.08)
IMM GRANULOCYTES NFR BLD AUTO: 0.3 %
LDLC SERPL CALC-MCNC: 103 MG/DL
LYMPHOCYTES # BLD: 1.74 K/UL (ref 1.2–3.5)
LYMPHOCYTES NFR BLD: 25.8 %
MCH RBC QN AUTO: 30.2 PG (ref 28–33.2)
MCHC RBC AUTO-ENTMCNC: 33.1 G/DL (ref 32.2–36.5)
MCV RBC AUTO: 91.2 FL (ref 83–98)
MONOCYTES # BLD: 0.56 K/UL (ref 0.3–1)
MONOCYTES NFR BLD: 8.3 %
NEUTROPHILS # BLD: 4.13 K/UL (ref 1.7–7)
NEUTS SEG NFR BLD: 61.3 %
NONHDLC SERPL-MCNC: 122 MG/DL
NRBC BLD-RTO: 0 %
PDW BLD AUTO: 13.5 FL (ref 9.4–12.4)
PLATELET # BLD AUTO: 153 K/UL (ref 150–350)
POTASSIUM SERPL-SCNC: 4.1 MEQ/L (ref 3.6–5.1)
PROT SERPL-MCNC: 7.3 G/DL (ref 6–8.2)
PSA SERPL-MCNC: 5.08 NG/ML
RBC # BLD AUTO: 4.54 M/UL (ref 4.5–5.8)
SODIUM SERPL-SCNC: 139 MEQ/L (ref 136–144)
TRIGL SERPL-MCNC: 96 MG/DL (ref 30–149)
TSH SERPL DL<=0.05 MIU/L-ACNC: 2.82 MIU/L (ref 0.34–5.6)
WBC # BLD AUTO: 6.74 K/UL (ref 3.8–10.5)

## 2022-10-06 PROCEDURE — 83036 HEMOGLOBIN GLYCOSYLATED A1C: CPT

## 2022-10-06 PROCEDURE — 36415 COLL VENOUS BLD VENIPUNCTURE: CPT

## 2022-10-06 PROCEDURE — 80053 COMPREHEN METABOLIC PANEL: CPT

## 2022-10-06 PROCEDURE — 84443 ASSAY THYROID STIM HORMONE: CPT

## 2022-10-06 PROCEDURE — 80061 LIPID PANEL: CPT

## 2022-10-06 PROCEDURE — 85025 COMPLETE CBC W/AUTO DIFF WBC: CPT

## 2022-10-06 PROCEDURE — 84153 ASSAY OF PSA TOTAL: CPT

## 2022-11-01 ENCOUNTER — OFFICE VISIT (OUTPATIENT)
Dept: PRIMARY CARE | Facility: CLINIC | Age: 67
End: 2022-11-01
Payer: COMMERCIAL

## 2022-11-01 VITALS
HEIGHT: 71 IN | RESPIRATION RATE: 17 BRPM | DIASTOLIC BLOOD PRESSURE: 78 MMHG | OXYGEN SATURATION: 99 % | HEART RATE: 64 BPM | TEMPERATURE: 98.1 F | BODY MASS INDEX: 26.23 KG/M2 | SYSTOLIC BLOOD PRESSURE: 142 MMHG | WEIGHT: 187.4 LBS

## 2022-11-01 DIAGNOSIS — Z23 NEED FOR VACCINATION: ICD-10-CM

## 2022-11-01 DIAGNOSIS — N40.0 BPH WITH ELEVATED PSA: ICD-10-CM

## 2022-11-01 DIAGNOSIS — E11.29 MICROALBUMINURIA DUE TO TYPE 2 DIABETES MELLITUS (CMS/HCC)  (CMS/HCC): ICD-10-CM

## 2022-11-01 DIAGNOSIS — I10 ESSENTIAL HYPERTENSION: ICD-10-CM

## 2022-11-01 DIAGNOSIS — E03.8 OTHER SPECIFIED HYPOTHYROIDISM: ICD-10-CM

## 2022-11-01 DIAGNOSIS — R80.9 MICROALBUMINURIA DUE TO TYPE 2 DIABETES MELLITUS (CMS/HCC)  (CMS/HCC): ICD-10-CM

## 2022-11-01 DIAGNOSIS — I25.10 CORONARY ARTERY CALCIFICATION SEEN ON CT SCAN: ICD-10-CM

## 2022-11-01 DIAGNOSIS — E78.00 PURE HYPERCHOLESTEROLEMIA: ICD-10-CM

## 2022-11-01 DIAGNOSIS — R97.20 BPH WITH ELEVATED PSA: ICD-10-CM

## 2022-11-01 DIAGNOSIS — K63.5 POLYP OF COLON, UNSPECIFIED PART OF COLON, UNSPECIFIED TYPE: ICD-10-CM

## 2022-11-01 DIAGNOSIS — E11.9 DIET-CONTROLLED DIABETES MELLITUS (CMS/HCC): Primary | ICD-10-CM

## 2022-11-01 PROCEDURE — 90686 IIV4 VACC NO PRSV 0.5 ML IM: CPT | Performed by: FAMILY MEDICINE

## 2022-11-01 PROCEDURE — 99214 OFFICE O/P EST MOD 30 MIN: CPT | Mod: 25 | Performed by: FAMILY MEDICINE

## 2022-11-01 PROCEDURE — 90471 IMMUNIZATION ADMIN: CPT | Performed by: FAMILY MEDICINE

## 2022-11-01 PROCEDURE — 3078F DIAST BP <80 MM HG: CPT | Performed by: FAMILY MEDICINE

## 2022-11-01 PROCEDURE — 3008F BODY MASS INDEX DOCD: CPT | Performed by: FAMILY MEDICINE

## 2022-11-01 PROCEDURE — 3077F SYST BP >= 140 MM HG: CPT | Performed by: FAMILY MEDICINE

## 2022-11-01 RX ORDER — ROSUVASTATIN CALCIUM 10 MG/1
10 TABLET, COATED ORAL DAILY
Qty: 90 TABLET | Refills: 1 | Status: SHIPPED | OUTPATIENT
Start: 2022-11-01 | End: 2023-04-24

## 2022-11-01 ASSESSMENT — ENCOUNTER SYMPTOMS
NAUSEA: 0
VOMITING: 0
CHEST TIGHTNESS: 0
LIGHT-HEADEDNESS: 0
MYALGIAS: 0
SHORTNESS OF BREATH: 0
ARTHRALGIAS: 0
ABDOMINAL PAIN: 0
PALPITATIONS: 0
FATIGUE: 0
DIZZINESS: 0

## 2022-11-01 NOTE — ASSESSMENT & PLAN NOTE
- I commended Rhys on his best A1c in the last several years so he will keep up the great work with his healthy diet and exercise we will repeat in 6 months  -he will see nayeli in next few mos

## 2022-11-01 NOTE — PROGRESS NOTES
"      Subjective      Patient ID: Rhys Campos is a 67 y.o. male.  1955      Here for dm follow up     DM  -A1c has improved from 6.8 down to 6.5% and his microalbumin was normal in January  -still careful with diet, but eats iced cream every night   -denies visual changes, cp, sob, dizziness.     BPH  -His PSA is increased from 3.96 up to 5.08 and he is under the care of urology- saw last mo and rec 3 mo f/u with MRI if PSA still elevated   -only awakening once/night to urinate, but does have minor hesitancy then     -TSH appropriate at 2.82    Hyperlipidemia  -LDL improved from 113 down to 103  -muscles and joints no longer achey on this this compared to Atorvastatin. No n/v or abd pain.     -3-year repeat colonoscopy was due in October 2022 through Dr. Jaramillo  -Continues to deny any blood in stools or change in bowel habits   -scheduled for 12/2022      The following have been reviewed and updated as appropriate in this visit:   Meds       Review of Systems   Constitutional: Negative for fatigue.   Respiratory: Negative for chest tightness and shortness of breath.    Cardiovascular: Negative for chest pain, palpitations and leg swelling.   Gastrointestinal: Negative for abdominal pain, nausea and vomiting.   Musculoskeletal: Negative for arthralgias and myalgias.   Neurological: Negative for dizziness, syncope and light-headedness.       Objective     Vitals:    11/01/22 1311 11/01/22 1357   BP: 130/80 (!) 142/78   BP Location: Left upper arm Left upper arm   Patient Position: Sitting Sitting   Pulse: 64    Resp: 17    Temp: 36.7 °C (98.1 °F)    TempSrc: Temporal    SpO2: 99%    Weight: 85 kg (187 lb 6.4 oz)    Height: 1.803 m (5' 11\")      Body mass index is 26.14 kg/m².    Physical Exam  Constitutional:       General: He is not in acute distress.     Appearance: Normal appearance. He is not ill-appearing, toxic-appearing or diaphoretic.   HENT:      Head: Normocephalic and atraumatic.   Cardiovascular: "      Rate and Rhythm: Normal rate and regular rhythm.      Heart sounds: Normal heart sounds. No murmur heard.    No friction rub. No gallop.   Pulmonary:      Effort: Pulmonary effort is normal. No respiratory distress.      Breath sounds: Normal breath sounds. No stridor. No wheezing, rhonchi or rales.   Abdominal:      General: Abdomen is flat. Bowel sounds are normal. There is no distension.      Palpations: Abdomen is soft. There is no mass.      Tenderness: There is no abdominal tenderness. There is no guarding or rebound.      Hernia: No hernia is present.   Musculoskeletal:      Right lower leg: No edema.      Left lower leg: No edema.   Neurological:      Mental Status: He is alert and oriented to person, place, and time. Mental status is at baseline.         Assessment/Plan   Diagnoses and all orders for this visit:    Diet-controlled diabetes mellitus (CMS/HCC) (Primary)  Assessment & Plan:  - I commended Rhys on his best A1c in the last several years so he will keep up the great work with his healthy diet and exercise we will repeat in 6 months  -he will see optho in next few mos    Orders:  -     Basic metabolic panel; Future  -     Hemoglobin A1c; Future  -     Microalbumin/Creatinine Ur Random; Future    Essential hypertension  Assessment & Plan:  - His home blood pressure readings are ranging 125-132/80-85 consistently and are never above 140/90  -Therefore, continue current medications and see me back in 6 months  -Although his blood pressure is slightly elevated systolic today at 142, he has known whitecoat elevation historically in the past, so we will continue to get his blood pressure checked 3 days/week and call me if ever above 140/90    Orders:  -     Basic metabolic panel; Future    Pure hypercholesterolemia  Assessment & Plan:  - His LDL is the best it has been for several years and he is tolerating the rosuvastatin much better than the atorvastatin with no myalgia or arthralgia, so he  will continue this as he declines any further increase of dosage at this time  -We will repeat his lipids though in 6 months and if LDL remains above 100, he agrees to increase rosuvastatin dosage to 20 mg    -In the meantime, he can try to cut back on his ice cream and other sweets    Orders:  -     Lipid panel; Future    Coronary artery calcification seen on CT scan  Assessment & Plan:  - Remains with no symptoms from a cardiopulmonary perspective-see above      Other specified hypothyroidism  Assessment & Plan:  - Thyroid exam is completely normal and his TSH is excellent, so continue current dosage of levothyroxine and repeat in 1 year      BPH with elevated PSA  Assessment & Plan:  - He is following closely with the urologist and will have repeat PSA done in January with an MRI of the prostate if needed at that time      Microalbuminuria due to type 2 diabetes mellitus (CMS/HCC)    Polyp of colon, unspecified part of colon, unspecified type  Assessment & Plan:  - Glad that he has his colonoscopy set up for December      Need for vaccination  Comments:  he declines Fluad, despite my counseling that this is more effective, and requests Flulaval instead    Other orders  -     Influenza vaccine quadrivalent preservative free 6 mon and older IM  -     rosuvastatin (CRESTOR) 10 mg tablet; Take 1 tablet (10 mg total) by mouth daily.

## 2022-11-01 NOTE — ASSESSMENT & PLAN NOTE
- His home blood pressure readings are ranging 125-132/80-85 consistently and are never above 140/90  -Therefore, continue current medications and see me back in 6 months  -Although his blood pressure is slightly elevated systolic today at 142, he has known whitecoat elevation historically in the past, so we will continue to get his blood pressure checked 3 days/week and call me if ever above 140/90

## 2022-11-01 NOTE — ASSESSMENT & PLAN NOTE
- Thyroid exam is completely normal and his TSH is excellent, so continue current dosage of levothyroxine and repeat in 1 year

## 2022-11-01 NOTE — ASSESSMENT & PLAN NOTE
- His LDL is the best it has been for several years and he is tolerating the rosuvastatin much better than the atorvastatin with no myalgia or arthralgia, so he will continue this as he declines any further increase of dosage at this time  -We will repeat his lipids though in 6 months and if LDL remains above 100, he agrees to increase rosuvastatin dosage to 20 mg    -In the meantime, he can try to cut back on his ice cream and other sweets

## 2023-01-05 ENCOUNTER — APPOINTMENT (OUTPATIENT)
Dept: LAB | Age: 68
End: 2023-01-05
Attending: UROLOGY
Payer: COMMERCIAL

## 2023-01-05 ENCOUNTER — TRANSCRIBE ORDERS (OUTPATIENT)
Dept: LAB | Age: 68
End: 2023-01-05

## 2023-01-05 DIAGNOSIS — R97.20 ELEVATED PROSTATE SPECIFIC ANTIGEN (PSA): Primary | ICD-10-CM

## 2023-01-05 DIAGNOSIS — R97.20 ELEVATED PROSTATE SPECIFIC ANTIGEN (PSA): ICD-10-CM

## 2023-01-05 LAB — PSA SERPL-MCNC: 4.27 NG/ML

## 2023-01-05 PROCEDURE — 84153 ASSAY OF PSA TOTAL: CPT

## 2023-01-05 PROCEDURE — 36415 COLL VENOUS BLD VENIPUNCTURE: CPT

## 2023-02-10 ENCOUNTER — TRANSCRIBE ORDERS (OUTPATIENT)
Dept: SCHEDULING | Age: 68
End: 2023-02-10

## 2023-02-10 DIAGNOSIS — R97.20 ELEVATED PROSTATE SPECIFIC ANTIGEN (PSA): Primary | ICD-10-CM

## 2023-02-13 ENCOUNTER — APPOINTMENT (OUTPATIENT)
Dept: URBAN - METROPOLITAN AREA CLINIC 203 | Age: 68
Setting detail: DERMATOLOGY
End: 2023-02-14

## 2023-02-13 DIAGNOSIS — L82.0 INFLAMED SEBORRHEIC KERATOSIS: ICD-10-CM

## 2023-02-13 PROCEDURE — 17110 DESTRUCT B9 LESION 1-14: CPT

## 2023-02-13 PROCEDURE — OTHER BENIGN DESTRUCTION: OTHER

## 2023-02-13 PROCEDURE — OTHER COUNSELING: OTHER

## 2023-02-13 ASSESSMENT — LOCATION DETAILED DESCRIPTION DERM: LOCATION DETAILED: LEFT INFERIOR LATERAL NECK

## 2023-02-13 ASSESSMENT — LOCATION ZONE DERM: LOCATION ZONE: NECK

## 2023-02-13 ASSESSMENT — LOCATION SIMPLE DESCRIPTION DERM: LOCATION SIMPLE: LEFT ANTERIOR NECK

## 2023-02-13 NOTE — PROCEDURE: BENIGN DESTRUCTION
Treatment Number (Will Not Render If 0): 0
Medical Necessity Information: It is in your best interest to select a reason for this procedure from the list below. All of these items fulfill various CMS LCD requirements except the new and changing color options.
Anesthesia Volume In Cc: 0.5
Render Note In Bullet Format When Appropriate: No
Consent: The patient's consent was obtained including but not limited to risks of crusting, scabbing, blistering, scarring, darker or lighter pigmentary change, recurrence, incomplete removal and infection.
Detail Level: Detailed
Medical Necessity Clause: This procedure was medically necessary because the lesions that were treated were:
Post-Care Instructions: I reviewed with the patient in detail post-care instructions. Patient is to wear sunprotection, and avoid picking at any of the treated lesions. Pt may apply Vaseline to crusted or scabbing areas.

## 2023-02-27 ENCOUNTER — HOSPITAL ENCOUNTER (OUTPATIENT)
Dept: RADIOLOGY | Facility: CLINIC | Age: 68
Discharge: HOME | End: 2023-02-27
Attending: UROLOGY
Payer: COMMERCIAL

## 2023-02-27 DIAGNOSIS — R97.20 ELEVATED PROSTATE SPECIFIC ANTIGEN (PSA): ICD-10-CM

## 2023-02-27 RX ORDER — GADOBUTROL 604.72 MG/ML
0.1 INJECTION INTRAVENOUS ONCE
Status: COMPLETED | OUTPATIENT
Start: 2023-02-27 | End: 2023-02-27

## 2023-02-27 RX ADMIN — GADOBUTROL 8.2 ML: 604.72 INJECTION INTRAVENOUS at 12:28

## 2023-03-15 ENCOUNTER — HOSPITAL ENCOUNTER (OUTPATIENT)
Dept: RADIOLOGY | Facility: CLINIC | Age: 68
Discharge: HOME | End: 2023-03-15
Attending: UROLOGY
Payer: MEDICARE

## 2023-03-15 DIAGNOSIS — R93.5 ABNORMAL FINDINGS ON DIAGNOSTIC IMAGING OF OTHER ABDOMINAL REGIONS, INCLUDING RETROPERITONEUM: ICD-10-CM

## 2023-04-10 LAB
ALBUMIN SERPL-MCNC: 3.9 G/DL (ref 3.4–5)
ALP SERPL-CCNC: 91 IU/L (ref 35–126)
ALT SERPL-CCNC: 28 IU/L (ref 16–63)
ANION GAP SERPL CALC-SCNC: 10 MEQ/L (ref 3–15)
AST SERPL-CCNC: 24 IU/L (ref 15–41)
BACTERIA URNS QL MICRO: ABNORMAL /HPF
BASOPHILS # BLD: 0.03 K/UL (ref 0.01–0.1)
BASOPHILS NFR BLD: 0.3 %
BILIRUB SERPL-MCNC: 0.6 MG/DL (ref 0.3–1.2)
BILIRUB UR QL STRIP.AUTO: NEGATIVE MG/DL
BUN SERPL-MCNC: 20 MG/DL (ref 8–20)
CALCIUM SERPL-MCNC: 9.1 MG/DL (ref 8.9–10.3)
CHLORIDE SERPL-SCNC: 102 MEQ/L (ref 98–109)
CLARITY UR REFRACT.AUTO: CLEAR
CO2 SERPL-SCNC: 25 MEQ/L (ref 22–32)
COLOR UR AUTO: YELLOW
CREAT SERPL-MCNC: 1.1 MG/DL (ref 0.8–1.3)
DIFFERENTIAL METHOD BLD: ABNORMAL
EOSINOPHIL # BLD: 0.12 K/UL (ref 0.04–0.54)
EOSINOPHIL NFR BLD: 1.1 %
ERYTHROCYTE [DISTWIDTH] IN BLOOD BY AUTOMATED COUNT: 12.6 % (ref 11.6–14.4)
GFR SERPL CREATININE-BSD FRML MDRD: >60 ML/MIN/1.73M*2
GLUCOSE SERPL-MCNC: 201 MG/DL (ref 70–99)
GLUCOSE UR STRIP.AUTO-MCNC: ABNORMAL MG/DL
HCT VFR BLDCO AUTO: 40.1 % (ref 40.1–51)
HGB BLD-MCNC: 13.6 G/DL (ref 13.7–17.5)
HGB UR QL STRIP.AUTO: 3
HYALINE CASTS #/AREA URNS LPF: ABNORMAL /LPF
IMM GRANULOCYTES # BLD AUTO: 0.03 K/UL (ref 0–0.08)
IMM GRANULOCYTES NFR BLD AUTO: 0.3 %
KETONES UR STRIP.AUTO-MCNC: NEGATIVE MG/DL
LEUKOCYTE ESTERASE UR QL STRIP.AUTO: NEGATIVE
LYMPHOCYTES # BLD: 1.12 K/UL (ref 1.2–3.5)
LYMPHOCYTES NFR BLD: 10 %
MCH RBC QN AUTO: 30.3 PG (ref 28–33.2)
MCHC RBC AUTO-ENTMCNC: 33.9 G/DL (ref 32.2–36.5)
MCV RBC AUTO: 89.3 FL (ref 83–98)
MONOCYTES # BLD: 0.8 K/UL (ref 0.3–1)
MONOCYTES NFR BLD: 7.2 %
NEUTROPHILS # BLD: 9.08 K/UL (ref 1.7–7)
NEUTS SEG NFR BLD: 81.1 %
NITRITE UR QL STRIP.AUTO: NEGATIVE
NRBC BLD-RTO: 0 %
PDW BLD AUTO: 12.1 FL (ref 9.4–12.4)
PH UR STRIP.AUTO: 7 [PH]
PLATELET # BLD AUTO: 143 K/UL (ref 150–350)
POTASSIUM SERPL-SCNC: 3.7 MEQ/L (ref 3.6–5.1)
PROT SERPL-MCNC: 7.5 G/DL (ref 6–8.2)
PROT UR QL STRIP.AUTO: 1
RBC # BLD AUTO: 4.49 M/UL (ref 4.5–5.8)
RBC #/AREA URNS HPF: ABNORMAL /HPF
SODIUM SERPL-SCNC: 137 MEQ/L (ref 136–144)
SP GR UR REFRACT.AUTO: 1.02
SQUAMOUS URNS QL MICRO: ABNORMAL /HPF
UROBILINOGEN UR STRIP-ACNC: 0.2 EU/DL
WBC # BLD AUTO: 11.18 K/UL (ref 3.8–10.5)
WBC #/AREA URNS HPF: ABNORMAL /HPF

## 2023-04-10 PROCEDURE — 36415 COLL VENOUS BLD VENIPUNCTURE: CPT | Performed by: EMERGENCY MEDICINE

## 2023-04-10 PROCEDURE — 0T9B70Z DRAINAGE OF BLADDER WITH DRAINAGE DEVICE, VIA NATURAL OR ARTIFICIAL OPENING: ICD-10-PCS | Performed by: EMERGENCY MEDICINE

## 2023-04-10 PROCEDURE — 81001 URINALYSIS AUTO W/SCOPE: CPT | Performed by: EMERGENCY MEDICINE

## 2023-04-10 PROCEDURE — 80053 COMPREHEN METABOLIC PANEL: CPT | Performed by: EMERGENCY MEDICINE

## 2023-04-10 PROCEDURE — 85025 COMPLETE CBC W/AUTO DIFF WBC: CPT | Performed by: EMERGENCY MEDICINE

## 2023-04-10 PROCEDURE — 51702 INSERT TEMP BLADDER CATH: CPT

## 2023-04-10 PROCEDURE — 99283 EMERGENCY DEPT VISIT LOW MDM: CPT | Mod: 25

## 2023-04-10 ASSESSMENT — ENCOUNTER SYMPTOMS
COUGH: 0
DIAPHORESIS: 1
SHORTNESS OF BREATH: 0
EYE REDNESS: 0
STRIDOR: 0
DIZZINESS: 0
CONFUSION: 0
NUMBNESS: 0
HEMATURIA: 0
HEADACHES: 0
DIARRHEA: 0
FEVER: 1
WHEEZING: 0
CHILLS: 1
DYSURIA: 0
FLANK PAIN: 0
ABDOMINAL DISTENTION: 1
ABDOMINAL PAIN: 1
LIGHT-HEADEDNESS: 0
DIFFICULTY URINATING: 1

## 2023-04-11 ENCOUNTER — TELEPHONE (OUTPATIENT)
Dept: PRIMARY CARE | Facility: CLINIC | Age: 68
End: 2023-04-11
Payer: COMMERCIAL

## 2023-04-11 ENCOUNTER — HOSPITAL ENCOUNTER (EMERGENCY)
Facility: HOSPITAL | Age: 68
Discharge: HOME | End: 2023-04-11
Attending: EMERGENCY MEDICINE
Payer: COMMERCIAL

## 2023-04-11 VITALS
DIASTOLIC BLOOD PRESSURE: 68 MMHG | RESPIRATION RATE: 16 BRPM | HEART RATE: 85 BPM | WEIGHT: 188.7 LBS | HEIGHT: 68 IN | SYSTOLIC BLOOD PRESSURE: 170 MMHG | OXYGEN SATURATION: 98 % | TEMPERATURE: 98.1 F | BODY MASS INDEX: 28.6 KG/M2

## 2023-04-11 DIAGNOSIS — R33.9 URINARY RETENTION: Primary | ICD-10-CM

## 2023-04-11 NOTE — TELEPHONE ENCOUNTER
Patient was in [facility] ED with inability to urinate on 4-11-23.  Please call patient for ER follow up.

## 2023-04-11 NOTE — ED PROVIDER NOTES
Emergency Medicine Note  HPI   HISTORY OF PRESENT ILLNESS     67-year-old male presents emergency room for evaluation of inability to urinate since about 1 PM this afternoon.  Patient is status post a prostate biopsy earlier in the day.  He states that he was unable to pass his urine, he checked his forehead temperature and was 101 and he got sweats and chills.  He came to the ER.  Had a catheter placed and a liter of fluid was drained and symptoms resolved.      Difficulty Urinating  Presenting symptoms: no dysuria    Presenting symptoms comment:  Retention  Context: spontaneously    Relieved by:  Nothing  Worsened by:  Nothing  Ineffective treatments:  Rest and position  Associated symptoms: abdominal pain, fever and urinary retention    Associated symptoms: no diarrhea, no flank pain and no hematuria          Patient History   PAST HISTORY     Reviewed from Nursing Triage:       Past Medical History:   Diagnosis Date   • BPH (benign prostatic hyperplasia)    • Graves disease    • Hypertension    • Lipid disorder    • Male erectile dysfunction    • Type 2 diabetes mellitus (CMS/HCC)        Past Surgical History:   Procedure Laterality Date   • CHOLECYSTECTOMY  2020   • COLONOSCOPY     • EYE SURGERY Bilateral     from Graves   • SINUS SURGERY         Family History   Problem Relation Age of Onset   • Other Biological Mother         natural causes -  at age 75   • Hypertension Biological Father    • Other Biological Father         natural causes -  at age 78   • No Known Problems Biological Sister        Social History     Tobacco Use   • Smoking status: Every Day     Packs/day: 0.75     Years: 40.00     Pack years: 30.00     Types: Cigarettes     Start date:    • Smokeless tobacco: Current   Substance Use Topics   • Alcohol use: No   • Drug use: No         Review of Systems   REVIEW OF SYSTEMS     Review of Systems   Constitutional: Positive for chills, diaphoresis and fever.   Eyes: Negative for  redness.   Respiratory: Negative for cough, shortness of breath, wheezing and stridor.    Cardiovascular: Negative for chest pain.   Gastrointestinal: Positive for abdominal distention and abdominal pain. Negative for diarrhea.   Genitourinary: Positive for difficulty urinating. Negative for dysuria, flank pain and hematuria.   Skin: Negative for pallor and rash.   Neurological: Negative for dizziness, light-headedness, numbness and headaches.   Psychiatric/Behavioral: Negative for confusion.         VITALS     ED Vitals    Date/Time Temp Pulse Resp BP SpO2 [facility]   04/10/23 2102 36.8 °C (98.3 °F) 89 18 173/79 99 % MET                       Physical Exam   PHYSICAL EXAM     Physical Exam  Constitutional:       General: He is in acute distress.      Appearance: He is not toxic-appearing or diaphoretic.   Eyes:      Conjunctiva/sclera: Conjunctivae normal.   Cardiovascular:      Rate and Rhythm: Normal rate and regular rhythm.      Pulses: Normal pulses.   Pulmonary:      Effort: No respiratory distress.      Breath sounds: No wheezing.   Abdominal:      General: There is distension.      Tenderness: There is abdominal tenderness.   Musculoskeletal:         General: Normal range of motion.   Skin:     Capillary Refill: Capillary refill takes less than 2 seconds.   Neurological:      Mental Status: He is alert and oriented to person, place, and time.           PROCEDURES     Procedures     DATA     Results     None          Imaging Results    None         No orders to display       Scoring tools                                  ED Course & MDM   MDM / ED COURSE / CLINICAL IMPRESSION / DISPO     Medical Decision Making  67-year-old male presents ER for evaluation of urinary retention status post a prostate biopsy today.  Asher catheter placed.  1 L of urine drained immediately.  Labs unremarkable.  Urinalysis clean.  Received antibiotics earlier in the day.  He will follow-up with his urologist.    Urinary retention: acute  illness or injury  Amount and/or Complexity of Data Reviewed  Labs: ordered.          ED Course as of 04/10/23 2132   Mon Apr 10, 2023   2131 1 Liter urine drained w placement domínguez catheter. Pts symptoms have resolved [MR]      ED Course User Index  [MR] Abdi Longo DO     Clinical Impression      None               Abdi Longo DO  04/11/23 0048

## 2023-04-12 NOTE — TELEPHONE ENCOUNTER
Spoke with patient and ED follow up call performed. Patient doing well and fully understands discharge instructions.    No additional needs identified at this time.  Patient will scheduled follow up visit with PCP if needed after urologists appointment tomorrow.    Reviewed PCP contact information, office hours, after-hours access and discussed use of Urgent Care.    No additional follow up needed at this time.

## 2023-04-23 DIAGNOSIS — E78.00 PURE HYPERCHOLESTEROLEMIA: Primary | ICD-10-CM

## 2023-04-24 RX ORDER — ROSUVASTATIN CALCIUM 10 MG/1
TABLET, COATED ORAL
Qty: 90 TABLET | Refills: 1 | Status: SHIPPED | OUTPATIENT
Start: 2023-04-24 | End: 2023-05-24 | Stop reason: ALTCHOICE

## 2023-04-24 NOTE — TELEPHONE ENCOUNTER
Medicine Refill Request    Last Office: 11/1/2022   Last Consult Visit: Visit date not found  Last Telemedicine Visit: Visit date not found    Next Appointment: 5/2/2023      Current Outpatient Medications:   •  amLODIPine (NORVASC) 10 mg tablet, Take 1 tablet (10 mg total) by mouth daily., Disp: 90 tablet, Rfl: 1  •  levothyroxine (SYNTHROID) 137 mcg tablet, Take 1 tablet (137 mcg total) by mouth once daily., Disp: 90 tablet, Rfl: 1  •  lisinopriL-hydrochlorothiazide (PRINZIDE,ZESTORETIC) 20-12.5 mg per tablet, Take 1 tablet by mouth daily., Disp: 90 tablet, Rfl: 1  •  rosuvastatin (CRESTOR) 10 mg tablet, Take 1 tablet (10 mg total) by mouth daily., Disp: 90 tablet, Rfl: 1  •  tamsulosin (FLOMAX) 0.4 mg capsule, Take 0.4 mg by mouth nightly., Disp: , Rfl:       BP Readings from Last 3 Encounters:   04/11/23 (!) 170/68   11/01/22 (!) 142/78   08/01/22 (!) 142/68       Recent Lab results:  Lab Results   Component Value Date    CHOL 156 10/06/2022   ,   Lab Results   Component Value Date    HDL 34 (L) 10/06/2022   ,   Lab Results   Component Value Date    LDLCALC 103 (H) 10/06/2022   ,   Lab Results   Component Value Date    TRIG 96 10/06/2022        Lab Results   Component Value Date    GLUCOSE 201 (H) 04/10/2023   ,   Lab Results   Component Value Date    HGBA1C 6.5 (H) 10/06/2022         Lab Results   Component Value Date    CREATININE 1.1 04/10/2023       Lab Results   Component Value Date    TSH 2.82 10/06/2022

## 2023-04-26 ENCOUNTER — APPOINTMENT (OUTPATIENT)
Dept: LAB | Age: 68
End: 2023-04-26
Attending: FAMILY MEDICINE
Payer: COMMERCIAL

## 2023-04-26 DIAGNOSIS — E78.00 PURE HYPERCHOLESTEROLEMIA: ICD-10-CM

## 2023-04-26 DIAGNOSIS — E11.9 DIET-CONTROLLED DIABETES MELLITUS (CMS/HCC): ICD-10-CM

## 2023-04-26 DIAGNOSIS — I10 ESSENTIAL HYPERTENSION: ICD-10-CM

## 2023-04-26 LAB
ANION GAP SERPL CALC-SCNC: 9 MEQ/L (ref 3–15)
BUN SERPL-MCNC: 11 MG/DL (ref 8–20)
CALCIUM SERPL-MCNC: 9.3 MG/DL (ref 8.9–10.3)
CHLORIDE SERPL-SCNC: 103 MEQ/L (ref 98–109)
CHOLEST SERPL-MCNC: 173 MG/DL
CO2 SERPL-SCNC: 27 MEQ/L (ref 22–32)
CREAT SERPL-MCNC: 1 MG/DL (ref 0.8–1.3)
CREAT UR-MCNC: 66.7 MG/DL
EST. AVERAGE GLUCOSE BLD GHB EST-MCNC: 163 MG/DL
GFR SERPL CREATININE-BSD FRML MDRD: >60 ML/MIN/1.73M*2
GLUCOSE SERPL-MCNC: 136 MG/DL (ref 70–99)
HBA1C MFR BLD: 7.3 %
HDLC SERPL-MCNC: 36 MG/DL
HDLC SERPL: 4.8 {RATIO}
LDLC SERPL CALC-MCNC: 117 MG/DL
MICROALBUMIN UR-MCNC: 122 MG/L
MICROALBUMIN/CREAT UR: 182.9 UG/MG
NONHDLC SERPL-MCNC: 137 MG/DL
POTASSIUM SERPL-SCNC: 3.9 MEQ/L (ref 3.6–5.1)
SODIUM SERPL-SCNC: 139 MEQ/L (ref 136–144)
TRIGL SERPL-MCNC: 102 MG/DL (ref 30–149)

## 2023-04-26 PROCEDURE — 36415 COLL VENOUS BLD VENIPUNCTURE: CPT

## 2023-04-26 PROCEDURE — 82570 ASSAY OF URINE CREATININE: CPT

## 2023-04-26 PROCEDURE — 80061 LIPID PANEL: CPT

## 2023-04-26 PROCEDURE — 83036 HEMOGLOBIN GLYCOSYLATED A1C: CPT

## 2023-04-26 PROCEDURE — 80048 BASIC METABOLIC PNL TOTAL CA: CPT

## 2023-05-02 ENCOUNTER — OFFICE VISIT (OUTPATIENT)
Dept: PRIMARY CARE | Facility: CLINIC | Age: 68
End: 2023-05-02
Payer: COMMERCIAL

## 2023-05-02 VITALS
WEIGHT: 189.8 LBS | HEART RATE: 76 BPM | SYSTOLIC BLOOD PRESSURE: 144 MMHG | DIASTOLIC BLOOD PRESSURE: 78 MMHG | TEMPERATURE: 97.4 F | OXYGEN SATURATION: 100 % | BODY MASS INDEX: 28.86 KG/M2 | RESPIRATION RATE: 17 BRPM

## 2023-05-02 DIAGNOSIS — R80.9 MICROALBUMINURIA DUE TO TYPE 2 DIABETES MELLITUS (CMS/HCC)  (CMS/HCC): ICD-10-CM

## 2023-05-02 DIAGNOSIS — K76.0 FATTY LIVER DISEASE, NONALCOHOLIC: ICD-10-CM

## 2023-05-02 DIAGNOSIS — Z72.0 TOBACCO ABUSE: ICD-10-CM

## 2023-05-02 DIAGNOSIS — E11.29 MICROALBUMINURIA DUE TO TYPE 2 DIABETES MELLITUS (CMS/HCC)  (CMS/HCC): ICD-10-CM

## 2023-05-02 DIAGNOSIS — I25.10 CORONARY ARTERY CALCIFICATION SEEN ON CT SCAN: ICD-10-CM

## 2023-05-02 DIAGNOSIS — M89.9 BONE LESION: ICD-10-CM

## 2023-05-02 DIAGNOSIS — Z12.2 SCREENING FOR LUNG CANCER: ICD-10-CM

## 2023-05-02 DIAGNOSIS — Z00.00 PREVENTATIVE HEALTH CARE: Primary | ICD-10-CM

## 2023-05-02 DIAGNOSIS — E78.00 PURE HYPERCHOLESTEROLEMIA: ICD-10-CM

## 2023-05-02 DIAGNOSIS — K63.5 POLYP OF COLON, UNSPECIFIED PART OF COLON, UNSPECIFIED TYPE: ICD-10-CM

## 2023-05-02 DIAGNOSIS — N40.0 BPH WITH ELEVATED PSA: ICD-10-CM

## 2023-05-02 DIAGNOSIS — D69.6 PLATELETS DECREASED (CMS/HCC): ICD-10-CM

## 2023-05-02 DIAGNOSIS — R91.8 MULTIPLE LUNG NODULES ON CT: ICD-10-CM

## 2023-05-02 DIAGNOSIS — K21.9 GASTROESOPHAGEAL REFLUX DISEASE WITHOUT ESOPHAGITIS: ICD-10-CM

## 2023-05-02 DIAGNOSIS — E11.9 DIET-CONTROLLED DIABETES MELLITUS (CMS/HCC): ICD-10-CM

## 2023-05-02 DIAGNOSIS — I10 ESSENTIAL HYPERTENSION: ICD-10-CM

## 2023-05-02 DIAGNOSIS — R97.20 BPH WITH ELEVATED PSA: ICD-10-CM

## 2023-05-02 DIAGNOSIS — E03.8 OTHER SPECIFIED HYPOTHYROIDISM: ICD-10-CM

## 2023-05-02 DIAGNOSIS — I77.811 AORTIC ECTASIA, ABDOMINAL (CMS/HCC): ICD-10-CM

## 2023-05-02 PROCEDURE — 99397 PER PM REEVAL EST PAT 65+ YR: CPT | Performed by: FAMILY MEDICINE

## 2023-05-02 PROCEDURE — 3008F BODY MASS INDEX DOCD: CPT | Performed by: FAMILY MEDICINE

## 2023-05-02 PROCEDURE — 3078F DIAST BP <80 MM HG: CPT | Performed by: FAMILY MEDICINE

## 2023-05-02 PROCEDURE — 3077F SYST BP >= 140 MM HG: CPT | Performed by: FAMILY MEDICINE

## 2023-05-02 RX ORDER — LEVOTHYROXINE SODIUM 137 UG/1
137 TABLET ORAL
Qty: 90 TABLET | Refills: 1 | Status: SHIPPED | OUTPATIENT
Start: 2023-05-02 | End: 2023-08-30

## 2023-05-02 RX ORDER — METFORMIN HYDROCHLORIDE 500 MG/1
500 TABLET ORAL 2 TIMES DAILY WITH MEALS
Qty: 180 TABLET | Refills: 1 | Status: SHIPPED | OUTPATIENT
Start: 2023-05-02 | End: 2023-09-21 | Stop reason: SDUPTHER

## 2023-05-02 ASSESSMENT — ENCOUNTER SYMPTOMS
ANAL BLEEDING: 0
FATIGUE: 0
APPETITE CHANGE: 0
UNEXPECTED WEIGHT CHANGE: 0
ABDOMINAL PAIN: 0
SLEEP DISTURBANCE: 0
CONSTIPATION: 0
DIZZINESS: 0
NAUSEA: 0
WEAKNESS: 0
HEADACHES: 0
CHEST TIGHTNESS: 0
VOMITING: 0
PALPITATIONS: 0
LIGHT-HEADEDNESS: 0
DYSPHORIC MOOD: 0
FEVER: 0
SHORTNESS OF BREATH: 0
NERVOUS/ANXIOUS: 0
WHEEZING: 0
DIARRHEA: 0
BLOOD IN STOOL: 0
NUMBNESS: 0
CHILLS: 0

## 2023-05-02 ASSESSMENT — PATIENT HEALTH QUESTIONNAIRE - PHQ9: SUM OF ALL RESPONSES TO PHQ9 QUESTIONS 1 & 2: 0

## 2023-05-02 NOTE — ASSESSMENT & PLAN NOTE
- [place] radiologist recommended an MRI of the lumbar spine  He has no symptoms whatsoever from a back pain perspective, but given the unclear characteristics on pelvic MRI, we will proceed with full lumbar MRI to better assess the summer

## 2023-05-02 NOTE — ASSESSMENT & PLAN NOTE
"- A1c is above goal now of less than 7%  -He admits to some dietary indiscretion recently, so we will focus on cutting back on \"junk/fast foods\"  -Increase fruits/vegetables and eliminate any sugary drinks  -Start metformin twice daily and repeat labs before he sees me next in 3 to 4 months  -Eye exam tomorrow as scheduled  "

## 2023-05-02 NOTE — ASSESSMENT & PLAN NOTE
- BP is minimally elevated today, but he reports at home that he is always less than 130/85 and I will have him continue to follow BPs once weekly and call if above goal of less than 140/90

## 2023-05-02 NOTE — ASSESSMENT & PLAN NOTE
- He remains with no symptoms from a cardiopulmonary perspective and plans to become more active at the gym after being somewhat more sedentary over the winter  -I would like to see his LDL closer to 70 and certainly less than 100 so I recommended that we increase the rosuvastatin up to 20 mg today, but he again declines this as he wants to focus on diet and prioritize his diabetes  -If still above 100 next visit, we should increase

## 2023-05-02 NOTE — PROGRESS NOTES
"      Subjective      Patient ID: Rhys Campos is a 67 y.o. male.  1955      Complaints: none  Changes to Medical Hx:   - home Bps: 120's-130/80s. Never >130/85.      Diet: well balanced. No sugary beverages. Eating \"a lot of junk\" with [employer] and cafeteria foods.   Exercise: Reduced to once/wk- no cp, sob, dizziness, wheezing with free weight at home.   T: 40 yrs x1 ppd, but he has reduced to half pack/day. \"I do not want to quit\"  EtOH: denies  D: denies  FHx of Cancer: no family history of colon, breast, or prostate cancer  FHx of CVD: denies MI, CVA, stent or bypass surgery.   Lives with: wife  Work: maintenance   Colonoscopy: repeat colonoscopy 10/2019- 3 yr repeat rec.has scheduled in 6 weeks.  No brbpr or abd pain.   CT Lung: Stable from 6/2021-1-year follow-up due next June. No coughing, wheezing, sob  Elevated PSA-had PI-RADS 4 MRI, so prostate biopsy was done on 4/11 through urologist.  MRI also mentioned a bone lesion at L5, but no MRI of the lumbar spine was recommended by specialist. Neg for cancer and rec 6 mo f/u. No blood, weak stream.      Prevnar/Pneumovax: Status post 2 dosages  Zosta: Had first Shingrix vaccine- planning for #2 at end of may  Td/TDap: 9/2020  Flu: UTD-recommended each fall  Wife had covid 19 and in hosp- continues to decline vaccine      CMP/LP: Most recent labs show A1c worsened from 6.5 up to 7.3%.  Microalbumin increased from 33.8 up to 182.9.  .  LDL worsened from 103 up to 117.  HepC: neg 12/3/2018         The following have been reviewed and updated as appropriate in this visit:   Tobacco  Allergies  Meds  Surg Hx  Fam Hx       Review of Systems   Constitutional: Negative for appetite change, chills, fatigue, fever and unexpected weight change.   HENT: Negative for dental problem (seeing dentist q 6 mos) and hearing loss.    Eyes: Negative for visual disturbance (seeing eye dr tomorrow).   Respiratory: Negative for chest tightness, shortness of breath " and wheezing.    Cardiovascular: Negative for chest pain, palpitations and leg swelling.   Gastrointestinal: Negative for abdominal pain, anal bleeding, blood in stool, constipation, diarrhea, nausea and vomiting.   Skin: Negative for rash.   Neurological: Negative for dizziness, syncope, weakness, light-headedness, numbness and headaches.   Psychiatric/Behavioral: Negative for dysphoric mood and sleep disturbance. The patient is not nervous/anxious.        Objective     Vitals:    05/02/23 1337 05/02/23 1359   BP: 138/70 (!) 144/78   BP Location: Left upper arm Left upper arm   Patient Position: Sitting Sitting   Pulse: 76    Resp: 17    Temp: 36.3 °C (97.4 °F)    TempSrc: Temporal    SpO2: 100%    Weight: 86.1 kg (189 lb 12.8 oz)      Body mass index is 28.86 kg/m².    Physical Exam  Constitutional:       General: He is not in acute distress.     Appearance: Normal appearance. He is well-developed and normal weight. He is not ill-appearing, toxic-appearing or diaphoretic.   HENT:      Head: Normocephalic and atraumatic.      Right Ear: Hearing, tympanic membrane, ear canal and external ear normal.      Left Ear: Hearing, tympanic membrane, ear canal and external ear normal.      Mouth/Throat:      Mouth: Mucous membranes are moist.      Pharynx: Oropharynx is clear.      Comments: + dentures   Eyes:      General: No scleral icterus.     Extraocular Movements: Extraocular movements intact.      Pupils: Pupils are equal, round, and reactive to light.   Neck:      Thyroid: No thyroid mass, thyromegaly or thyroid tenderness.   Cardiovascular:      Rate and Rhythm: Normal rate and regular rhythm.      Heart sounds: Normal heart sounds. No murmur heard.     No friction rub. No gallop.   Pulmonary:      Effort: Pulmonary effort is normal. No respiratory distress.      Breath sounds: Normal breath sounds. No stridor. No wheezing, rhonchi or rales.   Abdominal:      General: Bowel sounds are normal. There is no distension.  "     Palpations: Abdomen is soft. There is no mass.      Tenderness: There is no abdominal tenderness. There is no guarding or rebound.      Hernia: No hernia is present.   Musculoskeletal:      Right lower leg: No edema.      Left lower leg: No edema.   Lymphadenopathy:      Cervical: No cervical adenopathy.   Neurological:      Mental Status: He is alert and oriented to person, place, and time. Mental status is at baseline.      Cranial Nerves: No cranial nerve deficit.   Psychiatric:         Mood and Affect: Mood normal. Mood is not anxious.         Behavior: Behavior normal.         Thought Content: Thought content normal.         Judgment: Judgment normal.         Assessment/Plan   Diagnoses and all orders for this visit:    Preventative health care (Primary)  Comments:  He will get Shingrix #2 @ pharm end of mo  Still declines covid shots despite strong recommendations    Diet-controlled diabetes mellitus (CMS/[facility])  Assessment & Plan:  - A1c is above goal now of less than 7%  -He admits to some dietary indiscretion recently, so we will focus on cutting back on \"junk/fast foods\"  -Increase fruits/vegetables and eliminate any sugary drinks  -Start metformin twice daily and repeat labs before he sees me next in 3 to 4 months  -Eye exam tomorrow as scheduled    Orders:  -     Hemoglobin A1c; Future    Microalbuminuria due to type 2 diabetes mellitus (CMS/[facility])  Assessment & Plan:  - We will work on treating diabetes and continue current dosage of ACEI      Pure hypercholesterolemia  Assessment & Plan:  - See below    Orders:  -     Comprehensive metabolic panel; Future  -     Lipid panel; Future    Coronary artery calcification seen on CT scan  Assessment & Plan:  - He remains with no symptoms from a cardiopulmonary perspective and plans to become more active at the gym after being somewhat more sedentary over the winter  -I would like to see his LDL closer to 70 and certainly less than 100 so I recommended that we " increase the rosuvastatin up to 20 mg today, but he again declines this as he wants to focus on diet and prioritize his diabetes  -If still above 100 next visit, we should increase      Essential hypertension  Assessment & Plan:  - BP is minimally elevated today, but he reports at home that he is always less than 130/85 and I will have him continue to follow BPs once weekly and call if above goal of less than 140/90      Other specified hypothyroidism  Assessment & Plan:  - Thyroid exam remains normal and we will check his TSH with next labs    Orders:  -     TSH 3rd Generation; Future  -     levothyroxine (SYNTHROID) 137 mcg tablet; Take 1 tablet (137 mcg total) by mouth once daily.    Fatty liver disease, nonalcoholic  Assessment & Plan:  - His LFTs were recently normal and he will focus on healthy dietary practices      Gastroesophageal reflux disease without esophagitis    Aortic ectasia, abdominal (CMS/HCC)  Assessment & Plan:  - I will order this next visit to be done in the early fall      Multiple lung nodules on CT  Assessment & Plan:  - To be followed with his annual low-dose CT lung screen in July  -He declined smoking cessation options at this time and understands the risks    Orders:  -     CT LUNG SCREENING WITHOUT IV CONTRAST; Future    Tobacco abuse  -     CT LUNG SCREENING WITHOUT IV CONTRAST; Future    BPH with elevated PSA  Assessment & Plan:  - Fortunately, he reports that his prostate biopsy was negative with no evidence of prostate cancer, so he will continue to see his urologist at 6-month intervals      Bone lesion  Assessment & Plan:  - [place] radiologist recommended an MRI of the lumbar spine  He has no symptoms whatsoever from a back pain perspective, but given the unclear characteristics on pelvic MRI, we will proceed with full lumbar MRI to better assess the summer    Orders:  -     MRI LUMBAR SPINE WITHOUT CONTRAST; Future    Platelets decreased (CMS/HCC)  Assessment & Plan:  -  Intermittently low in the past and likely recently reactive in the context of his prostate biopsy, but we will repeat with next labs    Orders:  -     CBC and Differential; Future    Screening for lung cancer  -     CT LUNG SCREENING WITHOUT IV CONTRAST; Future    Polyp of colon, unspecified part of colon, unspecified type  Assessment & Plan:  -scheduled for cscope in July

## 2023-05-02 NOTE — ASSESSMENT & PLAN NOTE
- To be followed with his annual low-dose CT lung screen in July  -He declined smoking cessation options at this time and understands the risks

## 2023-05-02 NOTE — ASSESSMENT & PLAN NOTE
- Fortunately, he reports that his prostate biopsy was negative with no evidence of prostate cancer, so he will continue to see his urologist at 6-month intervals

## 2023-05-02 NOTE — ASSESSMENT & PLAN NOTE
- Intermittently low in the past and likely recently reactive in the context of his prostate biopsy, but we will repeat with next labs

## 2023-05-04 ENCOUNTER — HOSPITAL ENCOUNTER (EMERGENCY)
Facility: HOSPITAL | Age: 68
Discharge: HOME | End: 2023-05-05
Attending: EMERGENCY MEDICINE
Payer: COMMERCIAL

## 2023-05-04 DIAGNOSIS — R33.9 URINARY RETENTION: Primary | ICD-10-CM

## 2023-05-04 LAB
BACTERIA URNS QL MICRO: ABNORMAL /HPF
BILIRUB UR QL STRIP.AUTO: NEGATIVE MG/DL
CLARITY UR REFRACT.AUTO: CLEAR
COLOR UR AUTO: YELLOW
GLUCOSE UR STRIP.AUTO-MCNC: NEGATIVE MG/DL
HGB UR QL STRIP.AUTO: 1
HYALINE CASTS #/AREA URNS LPF: ABNORMAL /LPF
KETONES UR STRIP.AUTO-MCNC: NEGATIVE MG/DL
LEUKOCYTE ESTERASE UR QL STRIP.AUTO: NEGATIVE
NITRITE UR QL STRIP.AUTO: NEGATIVE
PH UR STRIP.AUTO: 6.5 [PH]
PROT UR QL STRIP.AUTO: ABNORMAL
RBC #/AREA URNS HPF: ABNORMAL /HPF
SP GR UR REFRACT.AUTO: 1.01
SQUAMOUS URNS QL MICRO: ABNORMAL /HPF
UROBILINOGEN UR STRIP-ACNC: 0.2 EU/DL
WBC #/AREA URNS HPF: ABNORMAL /HPF

## 2023-05-04 PROCEDURE — 0T9B70Z DRAINAGE OF BLADDER WITH DRAINAGE DEVICE, VIA NATURAL OR ARTIFICIAL OPENING: ICD-10-PCS | Performed by: EMERGENCY MEDICINE

## 2023-05-04 PROCEDURE — 81001 URINALYSIS AUTO W/SCOPE: CPT | Performed by: PHYSICIAN ASSISTANT

## 2023-05-04 PROCEDURE — 99283 EMERGENCY DEPT VISIT LOW MDM: CPT | Mod: 25

## 2023-05-04 PROCEDURE — 51702 INSERT TEMP BLADDER CATH: CPT

## 2023-05-04 ASSESSMENT — ENCOUNTER SYMPTOMS
ABDOMINAL PAIN: 0
BACK PAIN: 0
COUGH: 0
DIARRHEA: 0
HEADACHES: 0
NECK PAIN: 0
SHORTNESS OF BREATH: 0
NAUSEA: 0
SORE THROAT: 0
CHILLS: 0
DIZZINESS: 0
FEVER: 0
ARTHRALGIAS: 0
LIGHT-HEADEDNESS: 0
RHINORRHEA: 0
DIFFICULTY URINATING: 1

## 2023-05-05 ENCOUNTER — TELEPHONE (OUTPATIENT)
Dept: PRIMARY CARE | Facility: CLINIC | Age: 68
End: 2023-05-05
Payer: COMMERCIAL

## 2023-05-05 VITALS
SYSTOLIC BLOOD PRESSURE: 129 MMHG | WEIGHT: 189 LBS | HEART RATE: 80 BPM | OXYGEN SATURATION: 97 % | BODY MASS INDEX: 27.99 KG/M2 | DIASTOLIC BLOOD PRESSURE: 75 MMHG | HEIGHT: 69 IN | RESPIRATION RATE: 18 BRPM | TEMPERATURE: 97.7 F

## 2023-05-05 NOTE — ED PROVIDER NOTES
Emergency Medicine Note  HPI   HISTORY OF PRESENT ILLNESS     Pt here with urinary retention - pt says that this is his second episode. Pt last urinated around 430pm.  NO recent illnesses or changes in his medical status.            Patient History   PAST HISTORY     Reviewed from Nursing Triage:       Past Medical History:   Diagnosis Date   • BPH (benign prostatic hyperplasia)    • Graves disease    • Hypertension    • Lipid disorder    • Male erectile dysfunction    • Type 2 diabetes mellitus (CMS/HCC)        Past Surgical History:   Procedure Laterality Date   • CHOLECYSTECTOMY  2020   • COLONOSCOPY     • EYE SURGERY Bilateral     from Graves   • PROSTATE BIOPSY  2023   • SINUS SURGERY         Family History   Problem Relation Age of Onset   • Other Biological Mother         natural causes -  at age 75   • Hypertension Biological Father    • Other Biological Father         natural causes -  at age 78   • No Known Problems Biological Sister        Social History     Tobacco Use   • Smoking status: Every Day     Packs/day: 0.75     Years: 40.00     Pack years: 30.00     Types: Cigarettes     Start date:    • Smokeless tobacco: Current   Substance Use Topics   • Alcohol use: No   • Drug use: No         Review of Systems   REVIEW OF SYSTEMS     Review of Systems   Constitutional: Negative for chills and fever.   HENT: Negative for congestion, rhinorrhea and sore throat.    Respiratory: Negative for cough and shortness of breath.    Cardiovascular: Negative for chest pain.   Gastrointestinal: Negative for abdominal pain, diarrhea and nausea.   Genitourinary: Positive for difficulty urinating.   Musculoskeletal: Negative for arthralgias, back pain, gait problem and neck pain.   Skin: Negative for rash.   Neurological: Negative for dizziness, light-headedness and headaches.         VITALS     ED Vitals    Date/Time Temp Pulse Resp BP SpO2 [facility]   23 2314 -- 80 -- -- --    23 2224 36.5  °C (97.7 °F) 62 18 172/79 100 % IO                       Physical Exam   PHYSICAL EXAM     Physical Exam  Vitals and nursing note reviewed.   Constitutional:       Appearance: He is well-developed.   HENT:      Head: Normocephalic and atraumatic.   Eyes:      Conjunctiva/sclera: Conjunctivae normal.      Pupils: Pupils are equal, round, and reactive to light.   Cardiovascular:      Rate and Rhythm: Normal rate.      Heart sounds: No murmur heard.  Pulmonary:      Effort: Pulmonary effort is normal. No respiratory distress.      Breath sounds: Normal breath sounds.   Abdominal:      General: Bowel sounds are normal.      Palpations: Abdomen is soft.      Tenderness: There is no abdominal tenderness.   Genitourinary:     Comments: Asher catheter in place draining clear yellow urine  Musculoskeletal:         General: Normal range of motion.      Cervical back: Neck supple.   Skin:     General: Skin is warm and dry.   Neurological:      General: No focal deficit present.      Mental Status: He is alert and oriented to person, place, and time.   Psychiatric:         Mood and Affect: Mood normal.           PROCEDURES     Procedures     DATA     Results     Procedure Component Value Units Date/Time    UA with reflex culture [[id number]]  (Abnormal) Collected: 05/04/23 2247    Specimen: Urine, Clean Catch Updated: 05/04/23 2257    Narrative:      The following orders were created for panel order UA with reflex culture.  Procedure                               Abnormality         Status                     ---------                               -----------         ------                     UA Reflex to Culture (Ma...[[id number]]  Abnormal            Final result               UA Microscopic[[id number]]               Abnormal            Final result                 Please view results for these tests on the individual orders.    UA Microscopic [[id number]]  (Abnormal) Collected: 05/04/23 2247    Specimen: Urine, Clean Catch  Updated: 05/04/23 2257     RBC, Urine 36 TO 50 /HPF      WBC, Urine 0 TO 3 /HPF      Squamous Epithelial None Seen /hpf      Hyaline Cast None Seen /lpf      Bacteria, Urine None Seen /HPF     UA Reflex to Culture (Macroscopic) [[id number]]  (Abnormal) Collected: 05/04/23 2247    Specimen: Urine, Clean Catch Updated: 05/04/23 2254     Color, Urine Yellow     Clarity, Urine Clear     Specific Gravity, Urine 1.010     pH, Urine 6.5     Leukocyte Esterase Negative     Comment: Results can be falsely negative due to high specific gravity, some antibiotics, glucose >3 g/dl, or WBC other than neutrophils.        Nitrite, Urine Negative     Protein, Urine Trace     Comment: Trace False Positive Protein can be seen with alkaline or highly buffered urines or urine with high specific gravity. Suggest clinical correlation.        Glucose, Urine Negative mg/dL      Ketones, Urine Negative mg/dL      Urobilinogen, Urine 0.2 EU/dL      Bilirubin, Urine Negative mg/dL      Blood, Urine +1     Comment: The sensitivity of the occult blood test is equivalent to approximately 4 intact RBC/HPF.             Imaging Results    None         No orders to display       Scoring tools                                  ED Course & MDM   MDM / ED COURSE / CLINICAL IMPRESSION / DISPO     Medical Decision Making      ED Course as of 05/04/23 2359   u May 04, 2023   2359 UA with reflex culture(!) [RP]   2359 Asher catheter placed by RN draining clear yellow urine. Plan to dc.  [RP]      ED Course User Index  [RP] Michelle Jones DO     Clinical Impression      Urinary retention     _________________     ED Disposition   Discharge                   Michelle Jones DO  05/06/23 0807

## 2023-05-05 NOTE — DISCHARGE INSTRUCTIONS
Follow closely with your regular doctor.   Return to the ER for worsening in any way at all.  Continue your medications as previously prescribed.       How Severe Is It?: moderate Is This A New Presentation, Or A Follow-Up?: Follow Up Prurigo Nodularis

## 2023-05-05 NOTE — TELEPHONE ENCOUNTER
Patient was in [place] ED with Urinary Retention on 5-4-23.  Please call patient for ER follow up.

## 2023-05-11 ENCOUNTER — TELEPHONE (OUTPATIENT)
Dept: PRIMARY CARE | Facility: CLINIC | Age: 68
End: 2023-05-11
Payer: COMMERCIAL

## 2023-05-11 NOTE — TELEPHONE ENCOUNTER
Patient called to check the status of the prior auth on CT lung & MRI lumbar spine.  These orders were put in by dr aguilar on 5-2-23 and patient has not gotten a call from the prior authorization team.

## 2023-05-12 NOTE — TELEPHONE ENCOUNTER
Correct, CT of the lung should be done in July, but the MRI can be done now.    Please have the pre-CERT team start to work on the MRI

## 2023-05-12 NOTE — TELEPHONE ENCOUNTER
In your office visit note you have patient is to get the CT lung in July.  When should patient be getting the MRI?

## 2023-05-24 ENCOUNTER — OFFICE VISIT (OUTPATIENT)
Dept: PREADMISSION TESTING | Facility: HOSPITAL | Age: 68
End: 2023-05-24
Attending: UROLOGY
Payer: COMMERCIAL

## 2023-05-24 ENCOUNTER — HOSPITAL ENCOUNTER (OUTPATIENT)
Dept: CARDIOLOGY | Facility: HOSPITAL | Age: 68
Discharge: HOME | End: 2023-05-24
Attending: UROLOGY
Payer: COMMERCIAL

## 2023-05-24 ENCOUNTER — TRANSCRIBE ORDERS (OUTPATIENT)
Dept: REGISTRATION | Facility: HOSPITAL | Age: 68
End: 2023-05-24

## 2023-05-24 ENCOUNTER — APPOINTMENT (OUTPATIENT)
Dept: LAB | Facility: HOSPITAL | Age: 68
End: 2023-05-24
Attending: UROLOGY
Payer: COMMERCIAL

## 2023-05-24 ENCOUNTER — HOSPITAL ENCOUNTER (OUTPATIENT)
Dept: RADIOLOGY | Facility: HOSPITAL | Age: 68
Discharge: HOME | End: 2023-05-24
Attending: UROLOGY
Payer: COMMERCIAL

## 2023-05-24 VITALS
RESPIRATION RATE: 18 BRPM | HEART RATE: 75 BPM | WEIGHT: 183.1 LBS | SYSTOLIC BLOOD PRESSURE: 174 MMHG | OXYGEN SATURATION: 100 % | HEIGHT: 69 IN | DIASTOLIC BLOOD PRESSURE: 77 MMHG | TEMPERATURE: 98.1 F | BODY MASS INDEX: 27.12 KG/M2

## 2023-05-24 DIAGNOSIS — R33.8 OTHER RETENTION OF URINE: Primary | ICD-10-CM

## 2023-05-24 DIAGNOSIS — Z01.818 PREOP EXAM FOR INTERNAL MEDICINE: Primary | ICD-10-CM

## 2023-05-24 DIAGNOSIS — R80.9 MICROALBUMINURIA DUE TO TYPE 2 DIABETES MELLITUS (CMS/HCC)  (CMS/HCC): ICD-10-CM

## 2023-05-24 DIAGNOSIS — E78.00 PURE HYPERCHOLESTEROLEMIA: ICD-10-CM

## 2023-05-24 DIAGNOSIS — R31.0 GROSS HEMATURIA: ICD-10-CM

## 2023-05-24 DIAGNOSIS — I25.10 CORONARY ARTERY CALCIFICATION SEEN ON CT SCAN: ICD-10-CM

## 2023-05-24 DIAGNOSIS — R33.8 OTHER RETENTION OF URINE: ICD-10-CM

## 2023-05-24 DIAGNOSIS — Z72.0 TOBACCO ABUSE: ICD-10-CM

## 2023-05-24 DIAGNOSIS — R80.9 TYPE 2 DIABETES MELLITUS WITH MICROALBUMINURIA, WITHOUT LONG-TERM CURRENT USE OF INSULIN (CMS/HCC): ICD-10-CM

## 2023-05-24 DIAGNOSIS — I10 ESSENTIAL HYPERTENSION: ICD-10-CM

## 2023-05-24 DIAGNOSIS — R97.20 BPH WITH ELEVATED PSA: ICD-10-CM

## 2023-05-24 DIAGNOSIS — N40.0 BPH WITH ELEVATED PSA: ICD-10-CM

## 2023-05-24 DIAGNOSIS — E11.29 MICROALBUMINURIA DUE TO TYPE 2 DIABETES MELLITUS (CMS/HCC)  (CMS/HCC): ICD-10-CM

## 2023-05-24 DIAGNOSIS — E11.29 TYPE 2 DIABETES MELLITUS WITH MICROALBUMINURIA, WITHOUT LONG-TERM CURRENT USE OF INSULIN (CMS/HCC): ICD-10-CM

## 2023-05-24 DIAGNOSIS — E03.8 OTHER SPECIFIED HYPOTHYROIDISM: ICD-10-CM

## 2023-05-24 LAB
ABO + RH BLD: NORMAL
ANION GAP SERPL CALC-SCNC: 9 MEQ/L (ref 3–15)
APTT PPP: 32 SEC (ref 23–35)
ATRIAL RATE: 72
BLD GP AB SCN SERPL QL: NEGATIVE
BUN SERPL-MCNC: 16 MG/DL (ref 8–20)
CALCIUM SERPL-MCNC: 9.4 MG/DL (ref 8.9–10.3)
CHLORIDE SERPL-SCNC: 100 MEQ/L (ref 98–109)
CO2 SERPL-SCNC: 29 MEQ/L (ref 22–32)
CREAT SERPL-MCNC: 1.1 MG/DL (ref 0.8–1.3)
D AG BLD QL: POSITIVE
ERYTHROCYTE [DISTWIDTH] IN BLOOD BY AUTOMATED COUNT: 13 % (ref 11.6–14.4)
GFR SERPL CREATININE-BSD FRML MDRD: >60 ML/MIN/1.73M*2
GLUCOSE SERPL-MCNC: 241 MG/DL (ref 70–99)
HCT VFR BLDCO AUTO: 41.3 % (ref 40.1–51)
HGB BLD-MCNC: 14 G/DL (ref 13.7–17.5)
INR PPP: 1
LABORATORY COMMENT REPORT: NORMAL
MCH RBC QN AUTO: 30.4 PG (ref 28–33.2)
MCHC RBC AUTO-ENTMCNC: 33.9 G/DL (ref 32.2–36.5)
MCV RBC AUTO: 89.8 FL (ref 83–98)
P AXIS: 45
PDW BLD AUTO: 12.5 FL (ref 9.4–12.4)
PLATELET # BLD AUTO: 178 K/UL (ref 150–350)
POTASSIUM SERPL-SCNC: 3.9 MEQ/L (ref 3.6–5.1)
PR INTERVAL: 154
PROTHROMBIN TIME: 13.4 SEC (ref 12.2–14.5)
QRS DURATION: 158
QT INTERVAL: 436
QTC CALCULATION(BAZETT): 477
R AXIS: -26
RBC # BLD AUTO: 4.6 M/UL (ref 4.5–5.8)
SODIUM SERPL-SCNC: 138 MEQ/L (ref 136–144)
SPECIMEN EXP DATE BLD: NORMAL
T WAVE AXIS: 31
VENTRICULAR RATE: 72
WBC # BLD AUTO: 7.11 K/UL (ref 3.8–10.5)

## 2023-05-24 PROCEDURE — 86850 RBC ANTIBODY SCREEN: CPT

## 2023-05-24 PROCEDURE — 99211 OFF/OP EST MAY X REQ PHY/QHP: CPT | Performed by: INTERNAL MEDICINE

## 2023-05-24 PROCEDURE — 93005 ELECTROCARDIOGRAM TRACING: CPT

## 2023-05-24 PROCEDURE — 80048 BASIC METABOLIC PNL TOTAL CA: CPT | Mod: 59

## 2023-05-24 PROCEDURE — 3008F BODY MASS INDEX DOCD: CPT | Performed by: INTERNAL MEDICINE

## 2023-05-24 PROCEDURE — 87086 URINE CULTURE/COLONY COUNT: CPT

## 2023-05-24 PROCEDURE — 3078F DIAST BP <80 MM HG: CPT | Performed by: INTERNAL MEDICINE

## 2023-05-24 PROCEDURE — 85730 THROMBOPLASTIN TIME PARTIAL: CPT

## 2023-05-24 PROCEDURE — 85027 COMPLETE CBC AUTOMATED: CPT

## 2023-05-24 PROCEDURE — 86900 BLOOD TYPING SEROLOGIC ABO: CPT

## 2023-05-24 PROCEDURE — 71046 X-RAY EXAM CHEST 2 VIEWS: CPT

## 2023-05-24 PROCEDURE — 36415 COLL VENOUS BLD VENIPUNCTURE: CPT

## 2023-05-24 PROCEDURE — 85610 PROTHROMBIN TIME: CPT

## 2023-05-24 PROCEDURE — 3077F SYST BP >= 140 MM HG: CPT | Performed by: INTERNAL MEDICINE

## 2023-05-24 RX ORDER — ATORVASTATIN CALCIUM 20 MG/1
20 TABLET, FILM COATED ORAL NIGHTLY
COMMUNITY
End: 2023-09-21 | Stop reason: SDUPTHER

## 2023-05-24 ASSESSMENT — PAIN SCALES - GENERAL: PAINLEVEL: 0-NO PAIN

## 2023-05-24 NOTE — ASSESSMENT & PLAN NOTE
Declined increased dose of rosuvastatin by his PCP  Aware of goal LDL less than 70  Routine outpatient follow-up with PCP

## 2023-05-24 NOTE — ASSESSMENT & PLAN NOTE
Robotic simple prostatectomy planned for June 14, 2023  See below for statement in regards to perioperative risk  The patient was instructed to stop taking any NSAIDS per the surgeon's discretion  Recommend DVT prophylaxis at the discretion of the surgeon  Incentive spirometry and early ambulation post op  Post op bowel regimen  If present, recommend removal of Asher catheter as early as possible to prevent infection  Monitor CBC, BMP, and volume status in the perioperative period

## 2023-05-24 NOTE — ASSESSMENT & PLAN NOTE
Biopsy reported negative but MRI showed abnormal appearance of the bilateral peripheral zone of the prostate right greater than left  Extensive abnormal suspicious signal from the apex to the base  Indeterminate signal throughout left peripheral zone from apex to base

## 2023-05-24 NOTE — ASSESSMENT & PLAN NOTE
Tobacco cessation counseling  Nicotine patch 14 mg postop  Outpatient follow-up of lung nodules with annual low-dose CT screen  baseed on exam and ct findings of Stable mild pulmonary emphysema recommend outpatient pft's post surgery

## 2023-05-24 NOTE — ASSESSMENT & PLAN NOTE
Hold lisinopril, I will HCTZ, amlodipine on the day of surgery  Restart postop based on blood pressure curve

## 2023-05-24 NOTE — CONSULTS
[facility] Medicine Service -  Pre-Operative Consultation       Patient Name: Rhys Campos  Referring Surgeon:  Forrest Lee     Reason for Referral: Pre-Operative Evaluation  Surgical Procedure: Robotic simple prostatectomy  Operative Date: June 14, 2023  Other Providers:      PCP: Clint Coker Jr., DO          HISTORY OF PRESENT ILLNESS      Rhys Campos is a 67 y.o. male presenting today to the [facility] at  for pre-operative evaluation prior to planned surgery.    Patient complains of difficulty micturition, emptying of bladder, increased frequency and pain. Biopsy 4/10/23 at Dr. lee's office.  Patient states it did not show cancer however pathology shows a left lateral base high-grade prostate intraepithelial neoplasia.  Patient also states that prostate is not being removed just scraped.  He spoke with urology yesterday.  He seems to be a poor historian and also denies history of diabetes    In regards to medical history:    Tobacco abuse 40-pack-year history and continues to smoke half pack per day.  Does not want to quit  DM    The patient denies any current or recent chest pain or pressure, dyspnea, cough, sputum, fevers, chills, abdominal pain, nausea, vomiting, diarrhea or other symptoms.     Functionally, the patient is able to ascend a flight or so of stairs with no dyspnea or chest pain.     The patient denies, on specific questioning, the following:  No history of MI, arrhythmia,or CHF.  No history of LINA.  No history of DVT/PE.  No history of COPD.  No history of CVA.  No history of CKD.     PAST MEDICAL AND SURGICAL HISTORY      Past Medical History:   Diagnosis Date   • BPH (benign prostatic hyperplasia)    • COVID-19 vaccination not done    • Fatty liver    • Graves disease    • Hypertension    • Hypothyroidism    • Lipid disorder    • Liver disease    • Male erectile dysfunction    • Type 2 diabetes mellitus (CMS/HCC)     pt  denies       Past Surgical History:   Procedure Laterality Date   • CHOLECYSTECTOMY  05/2020   • COLONOSCOPY     • EYE SURGERY Bilateral     from Graves   • PROSTATE BIOPSY  04/11/2023   • SINUS SURGERY         MEDICATIONS        Current Outpatient Medications:   •  atorvastatin (LIPITOR) 20 mg tablet, Take 20 mg by mouth nightly., Disp: , Rfl:   •  amLODIPine (NORVASC) 10 mg tablet, Take 1 tablet (10 mg total) by mouth daily. (Patient taking differently: Take 10 mg by mouth every morning.), Disp: 90 tablet, Rfl: 1  •  levothyroxine (SYNTHROID) 137 mcg tablet, Take 1 tablet (137 mcg total) by mouth once daily. (Patient taking differently: Take 137 mcg by mouth every morning.), Disp: 90 tablet, Rfl: 1  •  lisinopriL-hydrochlorothiazide (PRINZIDE,ZESTORETIC) 20-12.5 mg per tablet, Take 1 tablet by mouth daily. (Patient taking differently: Take 1 tablet by mouth every morning.), Disp: 90 tablet, Rfl: 1  •  metFORMIN (GLUCOPHAGE) 500 mg tablet, Take 1 tablet (500 mg total) by mouth 2 (two) times a day with meals., Disp: 180 tablet, Rfl: 1  •  tamsulosin (FLOMAX) 0.4 mg capsule, Take 0.4 mg by mouth nightly., Disp: , Rfl:     ALLERGIES      Nickel    FAMILY HISTORY      family history includes Hypertension in his biological father; No Known Problems in his biological sister; Other in his biological father and biological mother.    Denies any prior known family history of DVTs/PEs/clotting disorder    SOCIAL HISTORY      Social History     Tobacco Use   • Smoking status: Every Day     Packs/day: 0.75     Years: 40.00     Pack years: 30.00     Types: Cigarettes     Start date: 1980     Passive exposure: Current   • Smokeless tobacco: Never   Vaping Use   • Vaping status: Never Used   Substance Use Topics   • Alcohol use: No   • Drug use: No       REVIEW OF SYSTEMS      All other systems reviewed and negative except as noted in HPI    PHYSICAL EXAMINATION      Visit Vitals  BP (!) 174/77   Pulse 75   Temp 36.7 °C (98.1 °F)  "(Oral)   Resp 18   Ht 1.753 m (5' 9\")   Wt 83.1 kg (183 lb 1.6 oz)   SpO2 100%   BMI 27.04 kg/m²     Body mass index is 27.04 kg/m².    Physical Exam   Constitutional : appears in no acute distress  Eyes : Extraocular movements are intact, pupils are equal in size and reactive to light bilaterally.    ENMT: JVD is not enlarged.  No lesions on oral mucosa.  Head is atraumatic  Respiratory : distant breath sounds to auscultation bilaterally  Cardiovascular : Normal first and second heart sounds.  No heart murmurs  GI : Soft, nontender.  There is no organomegaly.  Bowel sounds are normal  Musculoskeletal : No limitation of range of motion  Extremity: There is no cyanosis, clubbing or edema.  Distal pulses are 2+ bilateral dorsalis pedis  Skin: Warm to touch.  No rashes, no ulcers  Psychiatry: Patient is cooperative, appropriate.  No delusions or hallucinations  Neurological: Awake alert and oriented x 3.  Motor strength is equal bilateral upper and lower extremities.  Sensations are intact bilaterally. CN 2-12 grossly intact      LABS / EKG        Labs  I have reviewed the patient's pertinent labs.  Significant abnormals are Glucose 241.         Lab Results   Component Value Date     05/24/2023    K 3.9 05/24/2023     05/24/2023    BUN 16 05/24/2023    CREATININE 1.1 05/24/2023    WBC 7.11 05/24/2023    HGB 14.0 05/24/2023    HCT 41.3 05/24/2023     05/24/2023    ALT 28 04/10/2023    AST 24 04/10/2023    INR 1.0 05/24/2023    HGBA1C 7.3 (H) 04/26/2023         ECG/Telemetry  I have independently reviewed the ECG. Significant findings include NSR 72/MIN, rbbb. occasional pvc's.    ASSESSMENT AND PLAN         Preop exam for internal medicine  Robotic simple prostatectomy planned for June 14, 2023  See below for statement in regards to perioperative risk  The patient was instructed to stop taking any NSAIDS per the surgeon's discretion  Recommend DVT prophylaxis at the discretion of the " surgeon  Incentive spirometry and early ambulation post op  Post op bowel regimen  If present, recommend removal of Asher catheter as early as possible to prevent infection  Monitor CBC, BMP, and volume status in the perioperative period      Type 2 diabetes mellitus with kidney complication, without long-term current use of insulin (CMS/[facility])  Hemoglobin A1c 7.3  Sliding-scale insulin protocol  Diabetic diet no working with speech  Hold metformin on the day of surgery  Restart postop based on blood sugars and oral intake  Suspect noncompliance  Admits to junk food diet  Elevated blood sugars noted.  Hemoglobin A1c last month was 7.3.  Recommend hemoglobin A1c every 3 months    Microalbuminuria due to type 2 diabetes mellitus (CMS/[facility])  Hold ACE inhibitor on the day of surgery  Restart postop based on blood pressure curve  Routine outpatient diabetic care    Tobacco abuse  Tobacco cessation counseling  Nicotine patch 14 mg postop  Outpatient follow-up of lung nodules with annual low-dose CT screen  baseed on exam and ct findings of Stable mild pulmonary emphysema recommend outpatient pft's post surgery    Pure hypercholesterolemia  Continue perioperative Crestor    BPH with elevated PSA  Biopsy reported negative but MRI showed abnormal appearance of the bilateral peripheral zone of the prostate right greater than left  Extensive abnormal suspicious signal from the apex to the base  Indeterminate signal throughout left peripheral zone from apex to base      Other specified hypothyroidism  Continue levothyroxine    Coronary artery calcification seen on CT scan  Declined increased dose of rosuvastatin by his PCP  Aware of goal LDL less than 70  Routine outpatient follow-up with PCP    Essential hypertension  Hold lisinopril, I will HCTZ, amlodipine on the day of surgery  Restart postop based on blood pressure curve       In regards to perioperative cardiac risk:  Regarding perioperative cardiovascular risk:  The patient  has the following risk factors: none.  This correlates to a rCRI score of 0 with perioperative cardiac event risk of 0.4%.      Further comments:  Resume supplements when OK with surgical team.  I would encourage incentive spirometry to assist with minimizing henri-operative pulmonary risk.  DVT prophylaxis and timing of such per the discretion of the surgeon.     Elevated blood sugars noted.  Hemoglobin A1c last month was 7.3.  Recommend hemoglobin A1c every 3 months    Please do not hesitate to contact [facility] during the upcoming hospitalization with any questions or concerns.     Gregory Ramirez MD  5/24/2023

## 2023-05-24 NOTE — PRE-PROCEDURE INSTRUCTIONS
1. You will be called between 3pm-7pm one business day before your procedure to tell you where and when to report. If you do not receive a phone call by 7pm, please call the Admissions office at [phone].    2. Please report to Admissions or Short Procedure Unit on the day of your procedure.   Detailed directions will be given to you when you are called with arrival time.        3. Please follow the following fasting guidelines:     No solid food EIGHT HOURS prior to surgery.  Unlimited clear liquids, meaning water or PLAIN black coffee WITHOUT any milk, cream, sugar, or sweetener are permitted up to TWO HOURS prior to arrival at the hospital.    4. Please take ONLY the following medications with a sip of water on the morning of your procedure: (populate names and/or NONE)  Take synthroid. Stop vitamins & herbals 1 week before surgery as per Dr Ramirez    5. Other Instructions: You may brush your teeth the morning of the procedure. Rinse and spit, do not swallow.  Bring a list of your medications with dosages.  Use surgical wash as directed.     6. If you develop a cold, cough, fever, rash, or other symptom prior to the day of the procedure, please report it to your physician immediately.    7. If you need to cancel the procedure for any reason, please contact your physician.    8. Make arrangements to have safe transportation home accompanied by a responsible adult. If you have not arranged safe transportation home, your surgery will be cancelled. Safe transportation may include private vehicle, ride-share service, taxi and public transportation when accompanied by a responsible adult who will assist you home. A responsible adult is someone known to you and does not include the taxi, ride-share or public transit drive transporting you.    9.  If it is medically necessary for you to have a longer stay, you will be informed as soon as the decision is made.    10. Only bring essential items to the hospital.  Do  not wear or bring anything of value to the hospital including jewelry of any kind, money, or wallet. Do not wear make-up or contact lenses.  DO NOT BRING MEDICATIONS FROM HOME unless instructed to do so. DO bring your hearing aids, glasses, and a case    11. No lotion, creams, powders, or oils on skin the morning of procedure     12. Dress in comfortable clothes.    13.  If instructed, please bring a copy of your Advanced Directive (Living Will/Durable Power of ) on the day of your procedure.     14. Ensuring your safety at all times is a very important part of our [facility] Culture of Safety. After having surgery and sedation, you are at risk for falling and balance issues. Although you may feel awake, the effects of the medication can last up to 24 hours after anesthesia. If you need to use the bathroom during your recovery period, nursing staff will escort you there and stay with you to ensure your safety.    15. Refrain from drinking alcohol and smoking cigarettes for 24 hours prior to surgery.    16. Shower with antibacterial soap (Dial) the night before and morning of your procedure.  If your procedure indicates the need for CHG antiseptic wash (Bactoshield or Hibiclens), please use this instead and follow instructions as discussed at the time of your Pre-Admission Testing phone interview or visit.    Above instructions reviewed with patient and patient acknowledges understanding.    Form explained by: Zeny Marti LPN

## 2023-05-24 NOTE — H&P (VIEW-ONLY)
[facility] Medicine Service -  Pre-Operative Consultation       Patient Name: Rhys Campos  Referring Surgeon:  Forrest Lee     Reason for Referral: Pre-Operative Evaluation  Surgical Procedure: Robotic simple prostatectomy  Operative Date: June 14, 2023  Other Providers:      PCP: Clint Coker Jr., DO          HISTORY OF PRESENT ILLNESS      Rhys Campos is a 67 y.o. male presenting today to the [facility] at  for pre-operative evaluation prior to planned surgery.    Patient complains of difficulty micturition, emptying of bladder, increased frequency and pain. Biopsy 4/10/23 at Dr. lee's office.  Patient states it did not show cancer however pathology shows a left lateral base high-grade prostate intraepithelial neoplasia.  Patient also states that prostate is not being removed just scraped.  He spoke with urology yesterday.  He seems to be a poor historian and also denies history of diabetes    In regards to medical history:    Tobacco abuse 40-pack-year history and continues to smoke half pack per day.  Does not want to quit  DM    The patient denies any current or recent chest pain or pressure, dyspnea, cough, sputum, fevers, chills, abdominal pain, nausea, vomiting, diarrhea or other symptoms.     Functionally, the patient is able to ascend a flight or so of stairs with no dyspnea or chest pain.     The patient denies, on specific questioning, the following:  No history of MI, arrhythmia,or CHF.  No history of LINA.  No history of DVT/PE.  No history of COPD.  No history of CVA.  No history of CKD.     PAST MEDICAL AND SURGICAL HISTORY      Past Medical History:   Diagnosis Date   • BPH (benign prostatic hyperplasia)    • COVID-19 vaccination not done    • Fatty liver    • Graves disease    • Hypertension    • Hypothyroidism    • Lipid disorder    • Liver disease    • Male erectile dysfunction    • Type 2 diabetes mellitus (CMS/HCC)     pt  denies       Past Surgical History:   Procedure Laterality Date   • CHOLECYSTECTOMY  05/2020   • COLONOSCOPY     • EYE SURGERY Bilateral     from Graves   • PROSTATE BIOPSY  04/11/2023   • SINUS SURGERY         MEDICATIONS        Current Outpatient Medications:   •  atorvastatin (LIPITOR) 20 mg tablet, Take 20 mg by mouth nightly., Disp: , Rfl:   •  amLODIPine (NORVASC) 10 mg tablet, Take 1 tablet (10 mg total) by mouth daily. (Patient taking differently: Take 10 mg by mouth every morning.), Disp: 90 tablet, Rfl: 1  •  levothyroxine (SYNTHROID) 137 mcg tablet, Take 1 tablet (137 mcg total) by mouth once daily. (Patient taking differently: Take 137 mcg by mouth every morning.), Disp: 90 tablet, Rfl: 1  •  lisinopriL-hydrochlorothiazide (PRINZIDE,ZESTORETIC) 20-12.5 mg per tablet, Take 1 tablet by mouth daily. (Patient taking differently: Take 1 tablet by mouth every morning.), Disp: 90 tablet, Rfl: 1  •  metFORMIN (GLUCOPHAGE) 500 mg tablet, Take 1 tablet (500 mg total) by mouth 2 (two) times a day with meals., Disp: 180 tablet, Rfl: 1  •  tamsulosin (FLOMAX) 0.4 mg capsule, Take 0.4 mg by mouth nightly., Disp: , Rfl:     ALLERGIES      Nickel    FAMILY HISTORY      family history includes Hypertension in his biological father; No Known Problems in his biological sister; Other in his biological father and biological mother.    Denies any prior known family history of DVTs/PEs/clotting disorder    SOCIAL HISTORY      Social History     Tobacco Use   • Smoking status: Every Day     Packs/day: 0.75     Years: 40.00     Pack years: 30.00     Types: Cigarettes     Start date: 1980     Passive exposure: Current   • Smokeless tobacco: Never   Vaping Use   • Vaping status: Never Used   Substance Use Topics   • Alcohol use: No   • Drug use: No       REVIEW OF SYSTEMS      All other systems reviewed and negative except as noted in HPI    PHYSICAL EXAMINATION      Visit Vitals  BP (!) 174/77   Pulse 75   Temp 36.7 °C (98.1 °F)  "(Oral)   Resp 18   Ht 1.753 m (5' 9\")   Wt 83.1 kg (183 lb 1.6 oz)   SpO2 100%   BMI 27.04 kg/m²     Body mass index is 27.04 kg/m².    Physical Exam   Constitutional : appears in no acute distress  Eyes : Extraocular movements are intact, pupils are equal in size and reactive to light bilaterally.    ENMT: JVD is not enlarged.  No lesions on oral mucosa.  Head is atraumatic  Respiratory : distant breath sounds to auscultation bilaterally  Cardiovascular : Normal first and second heart sounds.  No heart murmurs  GI : Soft, nontender.  There is no organomegaly.  Bowel sounds are normal  Musculoskeletal : No limitation of range of motion  Extremity: There is no cyanosis, clubbing or edema.  Distal pulses are 2+ bilateral dorsalis pedis  Skin: Warm to touch.  No rashes, no ulcers  Psychiatry: Patient is cooperative, appropriate.  No delusions or hallucinations  Neurological: Awake alert and oriented x 3.  Motor strength is equal bilateral upper and lower extremities.  Sensations are intact bilaterally. CN 2-12 grossly intact      LABS / EKG        Labs  I have reviewed the patient's pertinent labs.  Significant abnormals are Glucose 241.         Lab Results   Component Value Date     05/24/2023    K 3.9 05/24/2023     05/24/2023    BUN 16 05/24/2023    CREATININE 1.1 05/24/2023    WBC 7.11 05/24/2023    HGB 14.0 05/24/2023    HCT 41.3 05/24/2023     05/24/2023    ALT 28 04/10/2023    AST 24 04/10/2023    INR 1.0 05/24/2023    HGBA1C 7.3 (H) 04/26/2023         ECG/Telemetry  I have independently reviewed the ECG. Significant findings include NSR 72/MIN, rbbb. occasional pvc's.    ASSESSMENT AND PLAN         Preop exam for internal medicine  Robotic simple prostatectomy planned for June 14, 2023  See below for statement in regards to perioperative risk  The patient was instructed to stop taking any NSAIDS per the surgeon's discretion  Recommend DVT prophylaxis at the discretion of the " surgeon  Incentive spirometry and early ambulation post op  Post op bowel regimen  If present, recommend removal of Asher catheter as early as possible to prevent infection  Monitor CBC, BMP, and volume status in the perioperative period      Type 2 diabetes mellitus with kidney complication, without long-term current use of insulin (CMS/[facility])  Hemoglobin A1c 7.3  Sliding-scale insulin protocol  Diabetic diet no working with speech  Hold metformin on the day of surgery  Restart postop based on blood sugars and oral intake  Suspect noncompliance  Admits to junk food diet  Elevated blood sugars noted.  Hemoglobin A1c last month was 7.3.  Recommend hemoglobin A1c every 3 months    Microalbuminuria due to type 2 diabetes mellitus (CMS/[facility])  Hold ACE inhibitor on the day of surgery  Restart postop based on blood pressure curve  Routine outpatient diabetic care    Tobacco abuse  Tobacco cessation counseling  Nicotine patch 14 mg postop  Outpatient follow-up of lung nodules with annual low-dose CT screen  baseed on exam and ct findings of Stable mild pulmonary emphysema recommend outpatient pft's post surgery    Pure hypercholesterolemia  Continue perioperative Crestor    BPH with elevated PSA  Biopsy reported negative but MRI showed abnormal appearance of the bilateral peripheral zone of the prostate right greater than left  Extensive abnormal suspicious signal from the apex to the base  Indeterminate signal throughout left peripheral zone from apex to base      Other specified hypothyroidism  Continue levothyroxine    Coronary artery calcification seen on CT scan  Declined increased dose of rosuvastatin by his PCP  Aware of goal LDL less than 70  Routine outpatient follow-up with PCP    Essential hypertension  Hold lisinopril, I will HCTZ, amlodipine on the day of surgery  Restart postop based on blood pressure curve       In regards to perioperative cardiac risk:  Regarding perioperative cardiovascular risk:  The patient  has the following risk factors: none.  This correlates to a rCRI score of 0 with perioperative cardiac event risk of 0.4%.      Further comments:  Resume supplements when OK with surgical team.  I would encourage incentive spirometry to assist with minimizing henri-operative pulmonary risk.  DVT prophylaxis and timing of such per the discretion of the surgeon.     Elevated blood sugars noted.  Hemoglobin A1c last month was 7.3.  Recommend hemoglobin A1c every 3 months    Please do not hesitate to contact [facility] during the upcoming hospitalization with any questions or concerns.     Gregory Ramirez MD  5/24/2023

## 2023-05-24 NOTE — ASSESSMENT & PLAN NOTE
Hemoglobin A1c 7.3  Sliding-scale insulin protocol  Diabetic diet no working with speech  Hold metformin on the day of surgery  Restart postop based on blood sugars and oral intake  Suspect noncompliance  Admits to junk food diet  Elevated blood sugars noted.  Hemoglobin A1c last month was 7.3.  Recommend hemoglobin A1c every 3 months

## 2023-05-24 NOTE — ASSESSMENT & PLAN NOTE
Hold ACE inhibitor on the day of surgery  Restart postop based on blood pressure curve  Routine outpatient diabetic care

## 2023-05-25 LAB — BACTERIA UR CULT: NORMAL

## 2023-06-13 ENCOUNTER — ANESTHESIA EVENT (OUTPATIENT)
Dept: OPERATING ROOM | Facility: HOSPITAL | Age: 68
Setting detail: HOSPITAL OUTPATIENT SURGERY
End: 2023-06-13
Payer: COMMERCIAL

## 2023-06-14 ENCOUNTER — ANESTHESIA (OUTPATIENT)
Dept: OPERATING ROOM | Facility: HOSPITAL | Age: 68
Setting detail: HOSPITAL OUTPATIENT SURGERY
End: 2023-06-14
Payer: COMMERCIAL

## 2023-06-14 ENCOUNTER — TRANSCRIBE ORDERS (OUTPATIENT)
Dept: SCHEDULING | Age: 68
End: 2023-06-14

## 2023-06-14 ENCOUNTER — HOSPITAL ENCOUNTER (OUTPATIENT)
Facility: HOSPITAL | Age: 68
Discharge: HOME | End: 2023-06-15
Attending: UROLOGY | Admitting: UROLOGY
Payer: COMMERCIAL

## 2023-06-14 DIAGNOSIS — R31.0 GROSS HEMATURIA: ICD-10-CM

## 2023-06-14 DIAGNOSIS — R33.8 ACUTE URINARY RETENTION: ICD-10-CM

## 2023-06-14 DIAGNOSIS — R33.8 OTHER RETENTION OF URINE: Primary | ICD-10-CM

## 2023-06-14 PROBLEM — Z90.79 S/P PROSTATECTOMY: Status: ACTIVE | Noted: 2023-06-14

## 2023-06-14 LAB
ABO + RH BLD: NORMAL
BLD GP AB SCN SERPL QL: NEGATIVE
D AG BLD QL: POSITIVE
GLUCOSE BLD-MCNC: 129 MG/DL (ref 70–99)
GLUCOSE BLD-MCNC: 174 MG/DL (ref 70–99)
LABORATORY COMMENT REPORT: NORMAL
POCT TEST: ABNORMAL
POCT TEST: ABNORMAL
SPECIMEN EXP DATE BLD: NORMAL

## 2023-06-14 PROCEDURE — 63600000 HC DRUGS/DETAIL CODE: Mod: JZ | Performed by: NURSE ANESTHETIST, CERTIFIED REGISTERED

## 2023-06-14 PROCEDURE — 88307 TISSUE EXAM BY PATHOLOGIST: CPT | Performed by: UROLOGY

## 2023-06-14 PROCEDURE — 71000011 HC PACU PHASE 1 EA ADDL MIN: Performed by: UROLOGY

## 2023-06-14 PROCEDURE — 71000001 HC PACU PHASE 1 INITIAL 30MIN: Performed by: UROLOGY

## 2023-06-14 PROCEDURE — 63600000 HC DRUGS/DETAIL CODE: Performed by: UROLOGY

## 2023-06-14 PROCEDURE — 36000006 HC OR LEVEL 6 INITIAL 30MIN: Performed by: UROLOGY

## 2023-06-14 PROCEDURE — 36415 COLL VENOUS BLD VENIPUNCTURE: CPT | Performed by: UROLOGY

## 2023-06-14 PROCEDURE — 27200000 HC STERILE SUPPLY: Performed by: UROLOGY

## 2023-06-14 PROCEDURE — 37000001 HC ANESTHESIA GENERAL: Performed by: UROLOGY

## 2023-06-14 PROCEDURE — 36000016 HC OR LEVEL 6 EA ADDL MIN: Performed by: UROLOGY

## 2023-06-14 PROCEDURE — 25800000 HC PHARMACY IV SOLUTIONS: Performed by: UROLOGY

## 2023-06-14 PROCEDURE — 86850 RBC ANTIBODY SCREEN: CPT

## 2023-06-14 PROCEDURE — 8E0W4CZ ROBOTIC ASSISTED PROCEDURE OF TRUNK REGION, PERCUTANEOUS ENDOSCOPIC APPROACH: ICD-10-PCS | Performed by: UROLOGY

## 2023-06-14 PROCEDURE — 25000000 HC PHARMACY GENERAL: Performed by: NURSE ANESTHETIST, CERTIFIED REGISTERED

## 2023-06-14 PROCEDURE — 0VB04ZZ EXCISION OF PROSTATE, PERCUTANEOUS ENDOSCOPIC APPROACH: ICD-10-PCS | Performed by: UROLOGY

## 2023-06-14 PROCEDURE — 63600000 HC DRUGS/DETAIL CODE: Mod: JZ | Performed by: ANESTHESIOLOGY

## 2023-06-14 PROCEDURE — 86900 BLOOD TYPING SEROLOGIC ABO: CPT

## 2023-06-14 PROCEDURE — 63700000 HC SELF-ADMINISTRABLE DRUG: Performed by: UROLOGY

## 2023-06-14 PROCEDURE — 25000000 HC PHARMACY GENERAL: Performed by: UROLOGY

## 2023-06-14 RX ORDER — SODIUM CHLORIDE, SODIUM LACTATE, POTASSIUM CHLORIDE, CALCIUM CHLORIDE 600; 310; 30; 20 MG/100ML; MG/100ML; MG/100ML; MG/100ML
INJECTION, SOLUTION INTRAVENOUS CONTINUOUS
Status: DISCONTINUED | OUTPATIENT
Start: 2023-06-14 | End: 2023-06-14

## 2023-06-14 RX ORDER — IBUPROFEN 200 MG
16-32 TABLET ORAL AS NEEDED
Status: DISCONTINUED | OUTPATIENT
Start: 2023-06-14 | End: 2023-06-15 | Stop reason: HOSPADM

## 2023-06-14 RX ORDER — FENTANYL CITRATE 50 UG/ML
50 INJECTION, SOLUTION INTRAMUSCULAR; INTRAVENOUS EVERY 5 MIN PRN
Status: COMPLETED | OUTPATIENT
Start: 2023-06-14 | End: 2023-06-14

## 2023-06-14 RX ORDER — DIPHENHYDRAMINE HCL 25 MG
25 CAPSULE ORAL EVERY 6 HOURS PRN
Status: DISCONTINUED | OUTPATIENT
Start: 2023-06-14 | End: 2023-06-15 | Stop reason: HOSPADM

## 2023-06-14 RX ORDER — OXYCODONE HYDROCHLORIDE 5 MG/1
5 TABLET ORAL EVERY 4 HOURS PRN
Status: DISCONTINUED | OUTPATIENT
Start: 2023-06-14 | End: 2023-06-15 | Stop reason: HOSPADM

## 2023-06-14 RX ORDER — TAMSULOSIN HYDROCHLORIDE 0.4 MG/1
0.4 CAPSULE ORAL NIGHTLY
Status: DISCONTINUED | OUTPATIENT
Start: 2023-06-15 | End: 2023-06-15 | Stop reason: HOSPADM

## 2023-06-14 RX ORDER — ONDANSETRON HYDROCHLORIDE 2 MG/ML
INJECTION, SOLUTION INTRAVENOUS AS NEEDED
Status: DISCONTINUED | OUTPATIENT
Start: 2023-06-14 | End: 2023-06-14 | Stop reason: SURG

## 2023-06-14 RX ORDER — HYDROMORPHONE HYDROCHLORIDE 1 MG/ML
0.5 INJECTION, SOLUTION INTRAMUSCULAR; INTRAVENOUS; SUBCUTANEOUS
Status: DISCONTINUED | OUTPATIENT
Start: 2023-06-14 | End: 2023-06-14 | Stop reason: HOSPADM

## 2023-06-14 RX ORDER — METOPROLOL TARTRATE 1 MG/ML
INJECTION, SOLUTION INTRAVENOUS AS NEEDED
Status: DISCONTINUED | OUTPATIENT
Start: 2023-06-14 | End: 2023-06-14 | Stop reason: SURG

## 2023-06-14 RX ORDER — DIPHENHYDRAMINE HCL 50 MG/ML
25 VIAL (ML) INJECTION EVERY 6 HOURS PRN
Status: DISCONTINUED | OUTPATIENT
Start: 2023-06-14 | End: 2023-06-15 | Stop reason: HOSPADM

## 2023-06-14 RX ORDER — SODIUM CHLORIDE 9 MG/ML
INJECTION, SOLUTION INTRAVENOUS CONTINUOUS
Status: DISCONTINUED | OUTPATIENT
Start: 2023-06-14 | End: 2023-06-15 | Stop reason: HOSPADM

## 2023-06-14 RX ORDER — KETOROLAC TROMETHAMINE 15 MG/ML
INJECTION, SOLUTION INTRAMUSCULAR; INTRAVENOUS AS NEEDED
Status: DISCONTINUED | OUTPATIENT
Start: 2023-06-14 | End: 2023-06-14 | Stop reason: SURG

## 2023-06-14 RX ORDER — BUPIVACAINE HYDROCHLORIDE 5 MG/ML
INJECTION, SOLUTION PERINEURAL
Status: DISCONTINUED | OUTPATIENT
Start: 2023-06-14 | End: 2023-06-14 | Stop reason: HOSPADM

## 2023-06-14 RX ORDER — MIDAZOLAM HYDROCHLORIDE 2 MG/2ML
INJECTION, SOLUTION INTRAMUSCULAR; INTRAVENOUS AS NEEDED
Status: DISCONTINUED | OUTPATIENT
Start: 2023-06-14 | End: 2023-06-14 | Stop reason: SURG

## 2023-06-14 RX ORDER — HYDROMORPHONE HYDROCHLORIDE 1 MG/ML
INJECTION, SOLUTION INTRAMUSCULAR; INTRAVENOUS; SUBCUTANEOUS AS NEEDED
Status: DISCONTINUED | OUTPATIENT
Start: 2023-06-14 | End: 2023-06-14 | Stop reason: SURG

## 2023-06-14 RX ORDER — AMLODIPINE BESYLATE 10 MG/1
10 TABLET ORAL DAILY
Status: DISCONTINUED | OUTPATIENT
Start: 2023-06-15 | End: 2023-06-15 | Stop reason: HOSPADM

## 2023-06-14 RX ORDER — KETOROLAC TROMETHAMINE 30 MG/ML
15 INJECTION, SOLUTION INTRAMUSCULAR; INTRAVENOUS EVERY 6 HOURS
Status: DISCONTINUED | OUTPATIENT
Start: 2023-06-14 | End: 2023-06-15 | Stop reason: HOSPADM

## 2023-06-14 RX ORDER — HEPARIN SODIUM 5000 [USP'U]/ML
5000 INJECTION, SOLUTION INTRAVENOUS; SUBCUTANEOUS EVERY 8 HOURS
Status: DISCONTINUED | OUTPATIENT
Start: 2023-06-15 | End: 2023-06-15 | Stop reason: HOSPADM

## 2023-06-14 RX ORDER — FENTANYL CITRATE 50 UG/ML
INJECTION, SOLUTION INTRAMUSCULAR; INTRAVENOUS AS NEEDED
Status: DISCONTINUED | OUTPATIENT
Start: 2023-06-14 | End: 2023-06-14 | Stop reason: SURG

## 2023-06-14 RX ORDER — POLYETHYLENE GLYCOL 3350 17 G/17G
17 POWDER, FOR SOLUTION ORAL DAILY
Status: DISCONTINUED | OUTPATIENT
Start: 2023-06-14 | End: 2023-06-15 | Stop reason: HOSPADM

## 2023-06-14 RX ORDER — ATORVASTATIN CALCIUM 20 MG/1
20 TABLET, FILM COATED ORAL NIGHTLY
Status: DISCONTINUED | OUTPATIENT
Start: 2023-06-15 | End: 2023-06-15 | Stop reason: HOSPADM

## 2023-06-14 RX ORDER — BISACODYL 10 MG/1
10 SUPPOSITORY RECTAL DAILY PRN
Status: DISCONTINUED | OUTPATIENT
Start: 2023-06-14 | End: 2023-06-15 | Stop reason: HOSPADM

## 2023-06-14 RX ORDER — ALUMINUM HYDROXIDE, MAGNESIUM HYDROXIDE, AND SIMETHICONE 1200; 120; 1200 MG/30ML; MG/30ML; MG/30ML
30 SUSPENSION ORAL EVERY 4 HOURS PRN
Status: DISCONTINUED | OUTPATIENT
Start: 2023-06-14 | End: 2023-06-15 | Stop reason: HOSPADM

## 2023-06-14 RX ORDER — ONDANSETRON HYDROCHLORIDE 2 MG/ML
4 INJECTION, SOLUTION INTRAVENOUS EVERY 8 HOURS PRN
Status: DISCONTINUED | OUTPATIENT
Start: 2023-06-14 | End: 2023-06-15 | Stop reason: HOSPADM

## 2023-06-14 RX ORDER — DEXAMETHASONE SODIUM PHOSPHATE 4 MG/ML
INJECTION, SOLUTION INTRA-ARTICULAR; INTRALESIONAL; INTRAMUSCULAR; INTRAVENOUS; SOFT TISSUE AS NEEDED
Status: DISCONTINUED | OUTPATIENT
Start: 2023-06-14 | End: 2023-06-14 | Stop reason: SURG

## 2023-06-14 RX ORDER — SODIUM CHLORIDE, SODIUM LACTATE, POTASSIUM CHLORIDE, CALCIUM CHLORIDE 600; 310; 30; 20 MG/100ML; MG/100ML; MG/100ML; MG/100ML
INJECTION, SOLUTION INTRAVENOUS CONTINUOUS
Status: ACTIVE | OUTPATIENT
Start: 2023-06-14 | End: 2023-06-14

## 2023-06-14 RX ORDER — ROCURONIUM BROMIDE 10 MG/ML
INJECTION, SOLUTION INTRAVENOUS AS NEEDED
Status: DISCONTINUED | OUTPATIENT
Start: 2023-06-14 | End: 2023-06-14 | Stop reason: SURG

## 2023-06-14 RX ORDER — OXYBUTYNIN CHLORIDE 5 MG/1
5 TABLET ORAL 3 TIMES DAILY
Status: DISCONTINUED | OUTPATIENT
Start: 2023-06-14 | End: 2023-06-15 | Stop reason: HOSPADM

## 2023-06-14 RX ORDER — HEPARIN SODIUM 5000 [USP'U]/ML
5000 INJECTION, SOLUTION INTRAVENOUS; SUBCUTANEOUS ONCE
Status: COMPLETED | OUTPATIENT
Start: 2023-06-14 | End: 2023-06-14

## 2023-06-14 RX ORDER — LIDOCAINE HCL/PF 100 MG/5ML
SYRINGE (ML) INTRAVENOUS AS NEEDED
Status: DISCONTINUED | OUTPATIENT
Start: 2023-06-14 | End: 2023-06-14 | Stop reason: SURG

## 2023-06-14 RX ORDER — KETOROLAC TROMETHAMINE 15 MG/ML
15 INJECTION, SOLUTION INTRAMUSCULAR; INTRAVENOUS ONCE
Status: COMPLETED | OUTPATIENT
Start: 2023-06-14 | End: 2023-06-14

## 2023-06-14 RX ORDER — SODIUM CHLORIDE 0.9 G/100ML
INJECTION, SOLUTION IRRIGATION
Status: DISCONTINUED | OUTPATIENT
Start: 2023-06-14 | End: 2023-06-14 | Stop reason: HOSPADM

## 2023-06-14 RX ORDER — PROPOFOL 10 MG/ML
INJECTION, EMULSION INTRAVENOUS AS NEEDED
Status: DISCONTINUED | OUTPATIENT
Start: 2023-06-14 | End: 2023-06-14 | Stop reason: SURG

## 2023-06-14 RX ORDER — SODIUM CHLORIDE, SODIUM LACTATE, POTASSIUM CHLORIDE, CALCIUM CHLORIDE 600; 310; 30; 20 MG/100ML; MG/100ML; MG/100ML; MG/100ML
INJECTION, SOLUTION INTRAVENOUS CONTINUOUS PRN
Status: DISCONTINUED | OUTPATIENT
Start: 2023-06-14 | End: 2023-06-14 | Stop reason: SURG

## 2023-06-14 RX ORDER — ONDANSETRON HYDROCHLORIDE 2 MG/ML
4 INJECTION, SOLUTION INTRAVENOUS
Status: DISCONTINUED | OUTPATIENT
Start: 2023-06-14 | End: 2023-06-14 | Stop reason: HOSPADM

## 2023-06-14 RX ORDER — HYDROMORPHONE HYDROCHLORIDE 1 MG/ML
0.25 INJECTION, SOLUTION INTRAMUSCULAR; INTRAVENOUS; SUBCUTANEOUS EVERY 4 HOURS PRN
Status: DISCONTINUED | OUTPATIENT
Start: 2023-06-14 | End: 2023-06-15 | Stop reason: HOSPADM

## 2023-06-14 RX ORDER — DEXTROSE 50 % IN WATER (D50W) INTRAVENOUS SYRINGE
25 AS NEEDED
Status: DISCONTINUED | OUTPATIENT
Start: 2023-06-14 | End: 2023-06-15 | Stop reason: HOSPADM

## 2023-06-14 RX ORDER — DEXTROSE 40 %
15-30 GEL (GRAM) ORAL AS NEEDED
Status: DISCONTINUED | OUTPATIENT
Start: 2023-06-14 | End: 2023-06-15 | Stop reason: HOSPADM

## 2023-06-14 RX ORDER — ONDANSETRON 4 MG/1
4 TABLET, ORALLY DISINTEGRATING ORAL EVERY 8 HOURS PRN
Status: DISCONTINUED | OUTPATIENT
Start: 2023-06-14 | End: 2023-06-15 | Stop reason: HOSPADM

## 2023-06-14 RX ADMIN — METOPROLOL TARTRATE 2.5 MG: 5 INJECTION, SOLUTION INTRAVENOUS at 13:19

## 2023-06-14 RX ADMIN — FENTANYL CITRATE 150 MCG: 50 INJECTION, SOLUTION INTRAMUSCULAR; INTRAVENOUS at 13:18

## 2023-06-14 RX ADMIN — SUGAMMADEX 400 MG: 100 INJECTION, SOLUTION INTRAVENOUS at 14:53

## 2023-06-14 RX ADMIN — ROCURONIUM BROMIDE 50 MG: 10 INJECTION, SOLUTION INTRAVENOUS at 12:47

## 2023-06-14 RX ADMIN — ROCURONIUM BROMIDE 30 MG: 10 INJECTION, SOLUTION INTRAVENOUS at 13:15

## 2023-06-14 RX ADMIN — HYDROMORPHONE HYDROCHLORIDE 0.5 MG: 1 INJECTION, SOLUTION INTRAMUSCULAR; INTRAVENOUS; SUBCUTANEOUS at 13:38

## 2023-06-14 RX ADMIN — FENTANYL CITRATE 50 MCG: 50 INJECTION, SOLUTION INTRAMUSCULAR; INTRAVENOUS at 15:37

## 2023-06-14 RX ADMIN — FENTANYL CITRATE 50 MCG: 50 INJECTION, SOLUTION INTRAMUSCULAR; INTRAVENOUS at 13:44

## 2023-06-14 RX ADMIN — DEXAMETHASONE SODIUM PHOSPHATE 4 MG: 4 INJECTION, SOLUTION INTRA-ARTICULAR; INTRALESIONAL; INTRAMUSCULAR; INTRAVENOUS; SOFT TISSUE at 13:15

## 2023-06-14 RX ADMIN — HYDROMORPHONE HYDROCHLORIDE 0.5 MG: 1 INJECTION, SOLUTION INTRAMUSCULAR; INTRAVENOUS; SUBCUTANEOUS at 16:44

## 2023-06-14 RX ADMIN — PROPOFOL INJECTABLE EMULSION 200 MG: 10 INJECTION, EMULSION INTRAVENOUS at 12:46

## 2023-06-14 RX ADMIN — ROCURONIUM BROMIDE 20 MG: 10 INJECTION, SOLUTION INTRAVENOUS at 13:45

## 2023-06-14 RX ADMIN — ONDANSETRON HYDROCHLORIDE 4 MG: 2 INJECTION, SOLUTION INTRAMUSCULAR; INTRAVENOUS at 14:48

## 2023-06-14 RX ADMIN — KETOROLAC TROMETHAMINE 15 MG: 15 INJECTION, SOLUTION INTRAMUSCULAR; INTRAVENOUS at 23:21

## 2023-06-14 RX ADMIN — FENTANYL CITRATE 50 MCG: 50 INJECTION, SOLUTION INTRAMUSCULAR; INTRAVENOUS at 14:49

## 2023-06-14 RX ADMIN — FENTANYL CITRATE 100 MCG: 50 INJECTION, SOLUTION INTRAMUSCULAR; INTRAVENOUS at 12:46

## 2023-06-14 RX ADMIN — SODIUM CHLORIDE: 9 INJECTION, SOLUTION INTRAVENOUS at 22:11

## 2023-06-14 RX ADMIN — KETOROLAC TROMETHAMINE 15 MG: 15 INJECTION, SOLUTION INTRAMUSCULAR; INTRAVENOUS at 14:51

## 2023-06-14 RX ADMIN — HYDROMORPHONE HYDROCHLORIDE 0.5 MG: 1 INJECTION, SOLUTION INTRAMUSCULAR; INTRAVENOUS; SUBCUTANEOUS at 16:17

## 2023-06-14 RX ADMIN — LIDOCAINE HYDROCHLORIDE 60 MG: 20 INJECTION, SOLUTION INTRAVENOUS at 12:46

## 2023-06-14 RX ADMIN — METOPROLOL TARTRATE 2.5 MG: 5 INJECTION, SOLUTION INTRAVENOUS at 13:34

## 2023-06-14 RX ADMIN — OXYBUTYNIN CHLORIDE 5 MG: 5 TABLET ORAL at 17:09

## 2023-06-14 RX ADMIN — SODIUM CHLORIDE: 9 INJECTION, SOLUTION INTRAVENOUS at 15:36

## 2023-06-14 RX ADMIN — MIDAZOLAM HYDROCHLORIDE 2 MG: 1 INJECTION, SOLUTION INTRAMUSCULAR; INTRAVENOUS at 12:41

## 2023-06-14 RX ADMIN — SODIUM CHLORIDE, POTASSIUM CHLORIDE, SODIUM LACTATE AND CALCIUM CHLORIDE: 600; 310; 30; 20 INJECTION, SOLUTION INTRAVENOUS at 11:36

## 2023-06-14 RX ADMIN — HEPARIN SODIUM 5000 UNITS: 5000 INJECTION, SOLUTION INTRAVENOUS; SUBCUTANEOUS at 11:35

## 2023-06-14 RX ADMIN — HYDROMORPHONE HYDROCHLORIDE 0.5 MG: 1 INJECTION, SOLUTION INTRAMUSCULAR; INTRAVENOUS; SUBCUTANEOUS at 15:55

## 2023-06-14 RX ADMIN — SODIUM CHLORIDE, POTASSIUM CHLORIDE, SODIUM LACTATE AND CALCIUM CHLORIDE: 600; 310; 30; 20 INJECTION, SOLUTION INTRAVENOUS at 12:41

## 2023-06-14 RX ADMIN — FENTANYL CITRATE 50 MCG: 50 INJECTION, SOLUTION INTRAMUSCULAR; INTRAVENOUS at 15:44

## 2023-06-14 ASSESSMENT — LIFESTYLE VARIABLES: TOBACCO_USE: 1

## 2023-06-14 NOTE — PROGRESS NOTES
ATTENDING    Patient scheduled for cystogram @ [facility] on 6/26/23 @ 915am  Follow up with myself scheduled 6/27/23 @ 1230pm    Forrest Mistry DO, MBS, FACOS  Urologic Oncology and Complex General Urology  [facility]  [phone]

## 2023-06-14 NOTE — ANESTHESIA PREPROCEDURE EVALUATION
Relevant Problems   CARDIOVASCULAR   (+) Essential hypertension      GASTROINTESTINAL   (+) Fatty liver disease, nonalcoholic   (+) Gastroesophageal reflux disease without esophagitis      URINARY SYSTEM   (+) BPH with elevated PSA      Other   (+) Other specified hypothyroidism   (+) Type 2 diabetes mellitus with kidney complication, without long-term current use of insulin (CMS/HCC)       Anesthesia ROS/MED HX    Anesthesia History - neg  Pulmonary    history of tobacco use  Neuro/Psych - neg  Cardiovascular   CAD   hypertension  Hematological - neg  GI/Hepatic   liver disease   GERD  Musculoskeletal- neg  Renal Disease   BPH  Endo/Other   Diabetes   Hypothyroidism       Past Surgical History:   Procedure Laterality Date   • CHOLECYSTECTOMY  05/2020   • COLONOSCOPY     • EYE SURGERY Bilateral     from Graves   • PROSTATE BIOPSY  04/11/2023   • SINUS SURGERY         Physical Exam    Airway   Mallampati: III   TM distance: >3 FB   Neck ROM: full  Cardiovascular - normal   Rhythm: regular   Rate: normalPulmonary - normal   clear to auscultation  Dental - normal        Anesthesia Plan    Plan: general    Technique: general endotracheal     Airway: oral intubation   3 ASA  Anesthetic plan and risks discussed with: patient  Induction:    intravenous   Postop Plan:   Patient Disposition: inpatient floor planned admission   Pain Management: IV analgesics

## 2023-06-14 NOTE — ANESTHESIOLOGIST PRE-PROCEDURE ATTESTATION
Pre-Procedure Patient Identification:  I am the Primary Anesthesiologist and have identified the patient on 06/14/23 at 11:21 AM.   I have confirmed the procedure(s) will be performed by the following surgeon/proceduralist Forrest Mistry DO.

## 2023-06-14 NOTE — INTERVAL H&P NOTE
H&P reviewed. The patient was examined and there are no changes to the H&P.    Forrest Mistry DO, DIPAK, FACOS  Urologic Oncology and Complex General Urology  [facility]  [phone]

## 2023-06-14 NOTE — ANESTHESIA PROCEDURE NOTES
Airway  Urgency: elective    Start Time: 6/14/2023 12:48 PM  Stop Time: 6/14/2023 12:48 PM    Airway not difficult    General Information and Staff    Patient location during procedure: OR  Anesthesiologist: Vargas Diallo MD  Resident/CRNA: Kiya Page CRNA  Performed by: Kiya Page CRNA  Authorized by: Vargas Diallo MD      Indications and Patient Condition  Indications for airway management: anesthesia and airway protection  Sedation level: general  Preoxygenated: yes  Mask difficulty assessment: 2 - vent by mask + OA or adjuvant +/- NMBA    Final Airway Details  Final airway type: endotracheal airway      Successful airway: ETT     Successful intubation technique: video laryngoscopy  Blade: Darrel  Blade size: #3  ETT size (mm): 7.5  Cormack-Lehane Classification: grade IIb - view of arytenoids or posterior of glottis only  Placement verified by: chest auscultation and capnometry   Measured from: lips  ETT to lips (cm): 24  Number of attempts at approach: 1  Number of other approaches attempted: 0

## 2023-06-14 NOTE — OP NOTE
Robotic simple prostatectomy Procedure Note     Procedure:    Robotic simple prostatectomy    Indications: The patient is a 67-year-old male with history of severe LUTS, history of urinary retention despite medical therapy.  The patient was advised to undergo robotic simple prostatectomy.  All risk benefits alternative treatment stressed with the patient and he wished to proceed with surgery.  Informed consent was obtained.       Pre-op Diagnosis     * Acute urinary retention [R33.8]     * Gross hematuria [R31.0]    Post-op Diagnosis     * Acute urinary retention [R33.8]     * Gross hematuria [R31.0]    Surgeon: Forrest Mistry DO     Assistants: SCOTTY    Anesthesia: General endotracheal anesthesia    ASA Class: Per anesthesia records      Procedure Details   Patient was brought to the operating room.  He was given appropriate prophylactic antibiotics.  SCDs as well as heparin SQ were given for DVT prophylaxis.   Anesthesia was induced without complication.  An OG tube was placed by anesthesia.  He was then prepped and draped in the standard sterile low lithotomy position.  All pressure points were padded.  A timeout was performed agreed upon by myself entire operative team.     A Asher catheter was placed in the standard sterile fashion for bladder decompression.  Given the patients previous surgery, a left upper quadrant Veress needle was placed.  After successful drop test, the abdomen was insufflated to 15 mmHg.  A transverse incision was made and an 8 mm port was placed.  The abdomen was inspected.  No damage to any intraabdominal contents were noted.  The Veress needle was removed.  Under direct visualization, three 8 mm robotic ports were placed in the standard fashion as well as a 12 mm assistant port in the left upper quadrant.  The patient was then placed in the Trendelenburg position and the robot was docked in the standard fashion.       Adhesions were released in the left lower quadrant and pelvis to  mobilize colon out of the pelvis and gain access to the bladder.  The bladder was filled with 240cc of sterile water via the Domínguez catheter.  A vertical incision was made into the bladder until it was entered.  The fluid was aspirated out.  Mild trabeculation was noted throughout the bladder.  No diverticulae or stones were present. I then used silk sutures on a kris needle percutaneously to tac open the bladder leaflets     I then identified bilaterally ureteral orifices.  These were in the native orthotopic location.  The patient had an extremely large prostate and an intravesical median lobe.  The domínguez balloon was taken down.  The prostate mucosa was scored with bovie electrocautery circumferentially, being mindful of the ureteral orifices.  Using combination of Bovie electrocautery blunt and sharp dissection prostatic adenoma was removed circumferentially from the bladder neck to the level of the verumontanum.  Once this was completed and he had an open channel and the domínguez easily slid into the bladder.  I then placed all specimen into an Endo Catch bag. I reexamined the prostatic fossa, and achieved adequate hemostasis with electrocautery. Carla was placed into the prostatic fossa and  I then placed a  3 way domínguez catheter into the bladder and placed 50 cc of sterile water into the balloon.  This was placed on gentle traction.  I reexamined the bladder and the ureteral orifices were reinspected and away from our resection site.  They continued to eflux bilaterally.       I removed the silk sutures. The bladder was then closed in 2 layers using 3-0 v loc suture.  After each layer the bladder was tested and there was no identifiable leak.  After the bladder was closed the patient was started on gentle continuous bladder irrigation via the Domínguez catheter.  And a GRAY drain was placed into the pelvis via one of the robotic port incisions and secured using silk suture.  At this time all robotic ports and equipment  were removed the robot was undocked and the patient was flattened.  I lengthened the left upper quadrant incision order to accommodate the size of the specimen.  This was removed via the Endo Catch bag.  This was sent as prostatic adenoma.  Fascia from the extraction port was closed with 0 PDS sutures in a figure of 8 fashion. Half percent Marcaine plain was used for local analgesia into each incision site.  2-0 vicryl was used for maximino's fascia.  Staples were used to close skin and sterile dressings were applied.  Patient was then awoken from anesthesia without complication with plans to transfer him to the postoperative care unit.  He will be admitted to the floor for postoperative care and monitoring.  All counts were correct at the end of the procedure.     His diet can be advanced as tolerated.  He will need to be discharged with the domínguez catheter in place and obtain a cystogram prior to outpatient removal.  His drain will be removed on POD 1-2 if the GRAY Cr is serum.        Findings:  See above    Estimated Blood Loss:  No blood loss documented.           Drains: 3 way domínguez catheter on CBI, GRAY drain           Total IV Fluids: Per anesthesia records           Specimens:   ID Type Source Tests Collected by Time Destination   1 : prostate adenoma Tissue Prostate PATHOLOGY TISSUE EXAM Forrest Mistry DO 6/14/2023 1411               Implants: * No implants in log *           Complications:  none           Disposition: PACU - hemodynamically stable.           Condition: stable    Forrest Mistry DO  Phone Number: [phone]

## 2023-06-14 NOTE — OR SURGEON
Pre-Procedure patient identification:  I am the primary operating surgeon/proceduralist and I have reviewed the applicable pathology reports and radiology studies for this procedure. I have identified the patient on 06/14/23 at 10:49 AM Forrest Mistry DO  Phone Number: [phone]

## 2023-06-14 NOTE — ASSESSMENT & PLAN NOTE
Treatment per primary team, Urology    Recommend DVT prophylaxis at the discretion of the surgeon  Incentive spirometry and early ambulation post op  Post op bowel regimen  If present, recommend removal of Asher catheter as early as possible to prevent infection  Monitor CBC, BMP, and volume status in the perioperative period

## 2023-06-15 ENCOUNTER — HOSPITAL ENCOUNTER (EMERGENCY)
Facility: HOSPITAL | Age: 68
Discharge: HOME | End: 2023-06-16
Attending: EMERGENCY MEDICINE
Payer: COMMERCIAL

## 2023-06-15 VITALS
HEART RATE: 59 BPM | DIASTOLIC BLOOD PRESSURE: 83 MMHG | HEIGHT: 69 IN | OXYGEN SATURATION: 100 % | RESPIRATION RATE: 16 BRPM | BODY MASS INDEX: 27.11 KG/M2 | SYSTOLIC BLOOD PRESSURE: 158 MMHG | WEIGHT: 183 LBS | TEMPERATURE: 97.8 F

## 2023-06-15 DIAGNOSIS — R50.9 FEVER, UNSPECIFIED FEVER CAUSE: Primary | ICD-10-CM

## 2023-06-15 LAB
ALBUMIN SERPL-MCNC: 3.6 G/DL (ref 3.4–5)
ALP SERPL-CCNC: 54 IU/L (ref 35–126)
ALT SERPL-CCNC: 26 IU/L (ref 16–63)
AMORPH CRY #/AREA URNS HPF: ABNORMAL /HPF
ANION GAP SERPL CALC-SCNC: 6 MEQ/L (ref 3–15)
ANION GAP SERPL CALC-SCNC: 6 MEQ/L (ref 3–15)
AST SERPL-CCNC: 26 IU/L (ref 15–41)
BACTERIA URNS QL MICRO: ABNORMAL /HPF
BASOPHILS # BLD: 0.04 K/UL (ref 0.01–0.1)
BASOPHILS NFR BLD: 0.3 %
BILIRUB SERPL-MCNC: 0.4 MG/DL (ref 0.3–1.2)
BILIRUB UR QL STRIP.AUTO: NEGATIVE MG/DL
BUN SERPL-MCNC: 14 MG/DL (ref 8–20)
BUN SERPL-MCNC: 19 MG/DL (ref 8–20)
CALCIUM SERPL-MCNC: 8.4 MG/DL (ref 8.9–10.3)
CALCIUM SERPL-MCNC: 8.5 MG/DL (ref 8.9–10.3)
CHLORIDE SERPL-SCNC: 105 MEQ/L (ref 98–109)
CHLORIDE SERPL-SCNC: 105 MEQ/L (ref 98–109)
CLARITY UR REFRACT.AUTO: ABNORMAL
CO2 SERPL-SCNC: 25 MEQ/L (ref 22–32)
CO2 SERPL-SCNC: 27 MEQ/L (ref 22–32)
COLOR UR AUTO: ABNORMAL
CREAT SERPL-MCNC: 0.9 MG/DL (ref 0.8–1.3)
CREAT SERPL-MCNC: 1 MG/DL (ref 0.8–1.3)
DIFFERENTIAL METHOD BLD: ABNORMAL
EOSINOPHIL # BLD: 0.4 K/UL (ref 0.04–0.54)
EOSINOPHIL NFR BLD: 3.5 %
ERYTHROCYTE [DISTWIDTH] IN BLOOD BY AUTOMATED COUNT: 12.7 % (ref 11.6–14.4)
ERYTHROCYTE [DISTWIDTH] IN BLOOD BY AUTOMATED COUNT: 12.8 % (ref 11.6–14.4)
FLUAV RNA SPEC QL NAA+PROBE: NEGATIVE
FLUBV RNA SPEC QL NAA+PROBE: NEGATIVE
GFR SERPL CREATININE-BSD FRML MDRD: >60 ML/MIN/1.73M*2
GFR SERPL CREATININE-BSD FRML MDRD: >60 ML/MIN/1.73M*2
GLUCOSE SERPL-MCNC: 152 MG/DL (ref 70–99)
GLUCOSE SERPL-MCNC: 206 MG/DL (ref 70–99)
GLUCOSE UR STRIP.AUTO-MCNC: ABNORMAL MG/DL
HCT VFR BLDCO AUTO: 39.1 % (ref 40.1–51)
HCT VFR BLDCO AUTO: 40.2 % (ref 40.1–51)
HGB BLD-MCNC: 13.1 G/DL (ref 13.7–17.5)
HGB BLD-MCNC: 13.7 G/DL (ref 13.7–17.5)
HGB UR QL STRIP.AUTO: 3
HYALINE CASTS #/AREA URNS LPF: ABNORMAL /LPF
IMM GRANULOCYTES # BLD AUTO: 0.04 K/UL (ref 0–0.08)
IMM GRANULOCYTES NFR BLD AUTO: 0.3 %
KETONES UR STRIP.AUTO-MCNC: NEGATIVE MG/DL
LEUKOCYTE ESTERASE UR QL STRIP.AUTO: 2
LYMPHOCYTES # BLD: 1.41 K/UL (ref 1.2–3.5)
LYMPHOCYTES NFR BLD: 12.3 %
MCH RBC QN AUTO: 30.2 PG (ref 28–33.2)
MCH RBC QN AUTO: 30.2 PG (ref 28–33.2)
MCHC RBC AUTO-ENTMCNC: 33.5 G/DL (ref 32.2–36.5)
MCHC RBC AUTO-ENTMCNC: 34.1 G/DL (ref 32.2–36.5)
MCV RBC AUTO: 88.7 FL (ref 83–98)
MCV RBC AUTO: 90.1 FL (ref 83–98)
MONOCYTES # BLD: 0.74 K/UL (ref 0.3–1)
MONOCYTES NFR BLD: 6.4 %
MUCOUS THREADS URNS QL MICRO: 2 /LPF
NEUTROPHILS # BLD: 8.85 K/UL (ref 1.7–7)
NEUTS SEG NFR BLD: 77.2 %
NITRITE UR QL STRIP.AUTO: NEGATIVE
NRBC BLD-RTO: 0 %
PDW BLD AUTO: 12.2 FL (ref 9.4–12.4)
PDW BLD AUTO: 12.9 FL (ref 9.4–12.4)
PH UR STRIP.AUTO: 6.5 [PH]
PLATELET # BLD AUTO: 152 K/UL (ref 150–350)
PLATELET # BLD AUTO: 160 K/UL (ref 150–350)
POTASSIUM SERPL-SCNC: 3.4 MEQ/L (ref 3.6–5.1)
POTASSIUM SERPL-SCNC: 4.3 MEQ/L (ref 3.6–5.1)
PROT SERPL-MCNC: 7.5 G/DL (ref 6–8.2)
PROT UR QL STRIP.AUTO: 3
RBC # BLD AUTO: 4.34 M/UL (ref 4.5–5.8)
RBC # BLD AUTO: 4.53 M/UL (ref 4.5–5.8)
RBC #/AREA URNS HPF: ABNORMAL /HPF
RSV RNA SPEC QL NAA+PROBE: NEGATIVE
SARS-COV-2 RNA RESP QL NAA+PROBE: NEGATIVE
SODIUM SERPL-SCNC: 136 MEQ/L (ref 136–144)
SODIUM SERPL-SCNC: 138 MEQ/L (ref 136–144)
SP GR UR REFRACT.AUTO: 1.03
SQUAMOUS URNS QL MICRO: ABNORMAL /HPF
UROBILINOGEN UR STRIP-ACNC: 0.2 EU/DL
WBC # BLD AUTO: 11.48 K/UL (ref 3.8–10.5)
WBC # BLD AUTO: 11.49 K/UL (ref 3.8–10.5)
WBC #/AREA URNS HPF: ABNORMAL /HPF

## 2023-06-15 PROCEDURE — 36415 COLL VENOUS BLD VENIPUNCTURE: CPT | Performed by: UROLOGY

## 2023-06-15 PROCEDURE — 85025 COMPLETE CBC W/AUTO DIFF WBC: CPT

## 2023-06-15 PROCEDURE — 85025 COMPLETE CBC W/AUTO DIFF WBC: CPT | Performed by: EMERGENCY MEDICINE

## 2023-06-15 PROCEDURE — 82310 ASSAY OF CALCIUM: CPT | Performed by: UROLOGY

## 2023-06-15 PROCEDURE — 87637 SARSCOV2&INF A&B&RSV AMP PRB: CPT | Performed by: EMERGENCY MEDICINE

## 2023-06-15 PROCEDURE — 81001 URINALYSIS AUTO W/SCOPE: CPT | Performed by: EMERGENCY MEDICINE

## 2023-06-15 PROCEDURE — 99284 EMERGENCY DEPT VISIT MOD MDM: CPT | Mod: 25

## 2023-06-15 PROCEDURE — 85027 COMPLETE CBC AUTOMATED: CPT | Performed by: UROLOGY

## 2023-06-15 PROCEDURE — 80053 COMPREHEN METABOLIC PANEL: CPT | Performed by: EMERGENCY MEDICINE

## 2023-06-15 PROCEDURE — 25800000 HC PHARMACY IV SOLUTIONS: Performed by: UROLOGY

## 2023-06-15 PROCEDURE — 99233 SBSQ HOSP IP/OBS HIGH 50: CPT | Mod: FS | Performed by: HOSPITALIST

## 2023-06-15 PROCEDURE — 87086 URINE CULTURE/COLONY COUNT: CPT | Performed by: EMERGENCY MEDICINE

## 2023-06-15 PROCEDURE — 63700000 HC SELF-ADMINISTRABLE DRUG: Performed by: UROLOGY

## 2023-06-15 PROCEDURE — 63600000 HC DRUGS/DETAIL CODE: Mod: JZ | Performed by: UROLOGY

## 2023-06-15 RX ORDER — OXYCODONE AND ACETAMINOPHEN 5; 325 MG/1; MG/1
1-2 TABLET ORAL EVERY 6 HOURS PRN
Qty: 12 TABLET | Refills: 0 | Status: SHIPPED | OUTPATIENT
Start: 2023-06-15 | End: 2023-06-20

## 2023-06-15 RX ORDER — POLYETHYLENE GLYCOL 3350 17 G/17G
17 POWDER, FOR SOLUTION ORAL DAILY
Qty: 7 PACKET | Refills: 0 | Status: SHIPPED | OUTPATIENT
Start: 2023-06-15 | End: 2023-09-21

## 2023-06-15 RX ORDER — LISINOPRIL 20 MG/1
20 TABLET ORAL DAILY
Status: DISCONTINUED | OUTPATIENT
Start: 2023-06-16 | End: 2023-06-15 | Stop reason: SDUPTHER

## 2023-06-15 RX ORDER — CEPHALEXIN 500 MG/1
500 CAPSULE ORAL 2 TIMES DAILY
Qty: 6 CAPSULE | Refills: 0 | Status: SHIPPED | OUTPATIENT
Start: 2023-06-15 | End: 2023-06-18

## 2023-06-15 RX ADMIN — AMLODIPINE BESYLATE 10 MG: 10 TABLET ORAL at 08:29

## 2023-06-15 RX ADMIN — SODIUM CHLORIDE: 9 INJECTION, SOLUTION INTRAVENOUS at 04:50

## 2023-06-15 RX ADMIN — KETOROLAC TROMETHAMINE 15 MG: 30 INJECTION, SOLUTION INTRAMUSCULAR; INTRAVENOUS at 08:28

## 2023-06-15 RX ADMIN — KETOROLAC TROMETHAMINE 15 MG: 30 INJECTION, SOLUTION INTRAMUSCULAR; INTRAVENOUS at 05:13

## 2023-06-15 RX ADMIN — LISINOPRIL: 20 TABLET ORAL at 08:29

## 2023-06-15 RX ADMIN — OXYBUTYNIN CHLORIDE 5 MG: 5 TABLET ORAL at 08:29

## 2023-06-15 RX ADMIN — POLYETHYLENE GLYCOL 3350 17 G: 17 POWDER, FOR SOLUTION ORAL at 08:29

## 2023-06-15 RX ADMIN — HEPARIN SODIUM 5000 UNITS: 5000 INJECTION, SOLUTION INTRAVENOUS; SUBCUTANEOUS at 05:15

## 2023-06-15 ASSESSMENT — ENCOUNTER SYMPTOMS
NAUSEA: 0
COUGH: 0
ABDOMINAL PAIN: 0
SHORTNESS OF BREATH: 0
FEVER: 1
HEADACHES: 0
RHINORRHEA: 0
NECK PAIN: 0
SORE THROAT: 0
LIGHT-HEADEDNESS: 0
DIFFICULTY URINATING: 0
DIZZINESS: 0
CHILLS: 0
DIARRHEA: 0
BACK PAIN: 0
ARTHRALGIAS: 0

## 2023-06-15 ASSESSMENT — COGNITIVE AND FUNCTIONAL STATUS - GENERAL
STANDING UP FROM CHAIR USING ARMS: 3 - A LITTLE
WALKING IN HOSPITAL ROOM: 3 - A LITTLE
MOVING TO AND FROM BED TO CHAIR: 3 - A LITTLE
CLIMB 3 TO 5 STEPS WITH RAILING: 3 - A LITTLE

## 2023-06-15 NOTE — PLAN OF CARE
CBI clear at 0510, clamped per order.  Urine red at 0700 CBI unclamped, reclamped at 0715 Dr. Mistry on his way to see pt.  Problem: Radical Prostatectomy  Goal: Optimal Coping  6/15/2023 0900 by Kori Villa RN  Outcome: Progressing  6/15/2023 0859 by Kori Villa RN  Outcome: Progressing  Goal: Absence of Bleeding  6/15/2023 0900 by Kori Villa RN  Outcome: Progressing  6/15/2023 0859 by Kori Villa RN  Outcome: Progressing  Goal: Effective Bowel Elimination  6/15/2023 0900 by Kori Villa RN  Outcome: Progressing  6/15/2023 0859 by Kori Villa RN  Outcome: Progressing  Goal: Fluid and Electrolyte Balance  6/15/2023 0900 by Kori Villa RN  Outcome: Progressing  6/15/2023 0859 by Kori Villa RN  Outcome: Progressing  Goal: Absence of Infection Signs and Symptoms  6/15/2023 0900 by Kori Villa RN  Outcome: Progressing  6/15/2023 0859 by Kori Villa RN  Outcome: Progressing  Goal: Anesthesia/Sedation Recovery  6/15/2023 0900 by Kori Villa RN  Outcome: Progressing  6/15/2023 0859 by Kori Villa RN  Outcome: Progressing  Goal: Optimal Pain Control and Function  6/15/2023 0900 by Kori Villa RN  Outcome: Progressing  6/15/2023 0859 by Kori Villa RN  Outcome: Progressing  Goal: Nausea and Vomiting Relief  6/15/2023 0900 by Kori Villa RN  Outcome: Progressing  6/15/2023 0859 by Kori Villa RN  Outcome: Progressing  Goal: Effective Oxygenation and Ventilation  6/15/2023 0900 by Kori Villa RN  Outcome: Progressing  6/15/2023 0859 by Kori Villa RN  Outcome: Progressing  Goal: Effective Urinary Elimination  6/15/2023 0900 by Kori Villa RN  Outcome: Progressing  6/15/2023 0859 by Kori Villa, RN  Outcome: Progressing

## 2023-06-15 NOTE — PLAN OF CARE
Care Coordination Admission Assessment Note    General Information:  Readmission Within the last 30 days: no previous admission in last 30 days  Does patient have a :    Patient-Specific Goals (include timeframe): To go home    Living Arrangements:  Arrived From: home  Current Living Arrangements: home  People in Home: spouse, child(elmer), adult  Home Accessibility:    Living Arrangement Comments: Pt sts lives Indep at home with wife and daughter. no steps to enter.    Housing Stability and Financial Resources (SDOH):  In the last 12 months, was there a time when you were not able to pay the mortgage or rent on time?: No  In the last 12 months, how many places have you lived?: 1  In the last 12 months, was there a time when you did not have a steady place to sleep or slept in a shelter (including now)?: No  How hard is it for you to pay for the very basics like food, housing, medical care, and heating?: Not hard at all    Functional Status Prior to Admission:   Assistive Device/Animal Currently Used at Home: blood pressure cuff  Functional Status Comments: Indep with mobility no assist device  IADL Comments: Indep IADLs     Supports and Services:  Current Outpatient/Agency/Support Group: none  Type of Current Home Care Services:    History of home care episode or rehab stay: n/a    Discharge Needs Assessment:   Concerns to be Addressed: no discharge needs identified  Current Discharge Risk:    Anticipated Changes Related to Illness: none    Patient/Family Anticipated Discharge Plan:  Patient/Family Anticipates Transition To: home with family  Patient/Family Anticipated Services at Transition: none    Patient Choice:   Offered/Gave Vendor List: no  Patient's Choice of Community Agency(s): n/a       Anticipated Discharge Plan:  Met with patient. Provided education and contact information for Care Coordination services.: yes  Anticipated Discharge Disposition: home without assistance or services      Transportation Needs (SDOH):  Transportation Concerns: none  Transportation Anticipated: family or friend will provide  Is Out of Hospital DNR needed at discharge?:      In the past 12 months, has lack of transportation kept you from medical appointments or from getting medications?: No  In the past 12 months, has lack of transportation kept you from meetings, work, or from getting things needed for daily living?: No    Concerns - comments: CC met with Pt at bedside. No anticipated needs upon d/c. CBI clamped. Voiding. Pt agreeable and will have ride home.

## 2023-06-15 NOTE — CONSULTS
Hospital Medicine Service -  Inpatient Consultation         Requesting Physician: Cristian Mistry    Reason for Consultation: Medical Management     HISTORY OF PRESENT ILLNESS        This is a 67 y.o. male with PMHx of BPH, HTN hypothyroid, HLD, GERD, DMII, liver disease is s/p day 1 robotic simple prostatectomy. [facility] [place] medical management of chronic conditions. On assessment patient states pain is 4/10 and manageable. Denies CP, SOB, abd pain, n/v, fever/chills, headache, numbness/tingling.     PAST MEDICAL AND SURGICAL HISTORY        Past Medical History:   Diagnosis Date   • BPH (benign prostatic hyperplasia)    • COVID-19 vaccination not done    • Fatty liver    • Graves disease    • Hypertension    • Hypothyroidism    • Lipid disorder    • Liver disease    • Male erectile dysfunction    • Type 2 diabetes mellitus (CMS/HCC)     pt denies       Past Surgical History:   Procedure Laterality Date   • CHOLECYSTECTOMY  05/2020   • COLONOSCOPY     • EYE SURGERY Bilateral     from Graves   • PROSTATE BIOPSY  04/11/2023   • SINUS SURGERY         PCP: Clint Coker Jr., DO    MEDICATIONS        Home Medications:  Medications Prior to Admission   Medication Sig Dispense Refill Last Dose   • levothyroxine (SYNTHROID) 137 mcg tablet Take 1 tablet (137 mcg total) by mouth once daily. (Patient taking differently: Take 137 mcg by mouth every morning.) 90 tablet 1 6/14/2023 at 0200   • amLODIPine (NORVASC) 10 mg tablet Take 1 tablet (10 mg total) by mouth daily. (Patient taking differently: Take 10 mg by mouth every morning.) 90 tablet 1    • atorvastatin (LIPITOR) 20 mg tablet Take 20 mg by mouth nightly.      • lisinopriL-hydrochlorothiazide (PRINZIDE,ZESTORETIC) 20-12.5 mg per tablet Take 1 tablet by mouth daily. (Patient taking differently: Take 1 tablet by mouth every morning.) 90 tablet 1    • metFORMIN (GLUCOPHAGE) 500 mg tablet Take 1 tablet (500 mg total) by mouth 2 (two) times a day with meals. 180  "tablet 1    • tamsulosin (FLOMAX) 0.4 mg capsule Take 0.4 mg by mouth nightly.          Current inpatient medications were personally reviewed.    ALLERGIES        Nickel    FAMILY HISTORY        Family History   Problem Relation Age of Onset   • Other Biological Mother         natural causes -  at age 75   • Hypertension Biological Father    • Other Biological Father         natural causes -  at age 78   • No Known Problems Biological Sister    • Lung cancer Neg Hx        SOCIAL HISTORY        Social History     Socioeconomic History   • Marital status:      Spouse name: None   • Number of children: None   • Years of education: None   • Highest education level: None   Occupational History   • Occupation: Maintenance     Comment: [employer]   Tobacco Use   • Smoking status: Every Day     Packs/day: 0.75     Years: 40.00     Pack years: 30.00     Types: Cigarettes     Start date:      Passive exposure: Current   • Smokeless tobacco: Never   Vaping Use   • Vaping Use: Never used   Substance and Sexual Activity   • Alcohol use: No   • Drug use: No   • Sexual activity: Defer   Social History Narrative    Lives with his wife.     Social Determinants of Health     Food Insecurity: No Food Insecurity (2023)    Hunger Vital Sign    • Worried About Running Out of Food in the Last Year: Never true    • Ran Out of Food in the Last Year: Never true       REVIEW OF SYSTEMS        All other systems reviewed and negative except as noted in HPI    PHYSICAL EXAMINATION        Visit Vitals  BP (!) 153/74   Pulse (!) 55   Temp 36.4 °C (97.5 °F) (Oral)   Resp 16   Ht 1.753 m (5' 9\")   Wt 83 kg (183 lb)   SpO2 98%   BMI 27.02 kg/m²     Body mass index is 27.02 kg/m².    Intake/Output Summary (Last 24 hours) at 6/15/2023 0449  Last data filed at 2023 2200  Gross per 24 hour   Intake 2200 ml   Output 4185 ml   Net -1985 ml       Physical Exam  Constitutional:       Appearance: Normal appearance. He is " normal weight.   HENT:      Head: Normocephalic and atraumatic.      Nose: Nose normal.      Mouth/Throat:      Mouth: Mucous membranes are moist.      Pharynx: Oropharynx is clear.   Eyes:      Extraocular Movements: Extraocular movements intact.      Pupils: Pupils are equal, round, and reactive to light.   Cardiovascular:      Rate and Rhythm: Normal rate and regular rhythm.      Pulses: Normal pulses.      Heart sounds: Normal heart sounds.   Pulmonary:      Effort: Pulmonary effort is normal.      Breath sounds: Normal breath sounds.   Abdominal:      General: Abdomen is flat. Bowel sounds are normal.      Palpations: Abdomen is soft.   Genitourinary:     Comments: Asher in place draining clear yellow urine, CBI  Musculoskeletal:         General: Normal range of motion.      Cervical back: Normal range of motion and neck supple.   Skin:     General: Skin is warm and dry.      Capillary Refill: Capillary refill takes less than 2 seconds.      Comments: GRAY drain on right side abdomen draining bloody drainage. Drsg CDI   Neurological:      General: No focal deficit present.      Mental Status: He is alert and oriented to person, place, and time.   Psychiatric:         Mood and Affect: Mood normal.         Behavior: Behavior normal.         LABS / EKG        Labs      SARS-CoV-2 (COVID-19) (no units)   Date/Time Value   05/20/2020 1914 Negative        ECG/Telemetry  Sinus rhythm with occasional Premature ventricular complexes   Right bundle branch block   When compared with ECG of 20-MAY-2020 14:52,   Right bundle branch block is now Present   Confirmed by Vasu Mendoza ([id number]) on 5/24/2023 4:36:48 PM      Specimen Collected: 05/24/23 07:31            Imaging  CXR-5/24/2023  --  IMPRESSION: No acute disease in the chest.    ASSESSMENT AND RECOMMENDATIONS           * S/P prostatectomy  Assessment & Plan  Treatment per primary team, Urology    Recommend DVT prophylaxis at the discretion of the surgeon  Incentive  spirometry and early ambulation post op  Post op bowel regimen  If present, recommend removal of Asher catheter as early as possible to prevent infection  Monitor CBC, BMP, and volume status in the perioperative period    Tobacco abuse  Assessment & Plan  Smoking cessation strongly recommended  Will order nicotine patch    Fatty liver disease, nonalcoholic  Assessment & Plan  LFTs WNL, will continue to monitor    Gastroesophageal reflux disease without esophagitis  Assessment & Plan  Continue Maalox as needed for indigestion    Type 2 diabetes mellitus with kidney complication, without long-term current use of insulin (CMS/[facility])  Assessment & Plan  POCT and ISS  Can resume oral meds at discharge    BPH with elevated PSA  Assessment & Plan  S/p postatectomy    Other specified hypothyroidism  Assessment & Plan  Continue levothyroxine    Pure hypercholesterolemia  Assessment & Plan  Continue atorvastatin    Essential hypertension  Assessment & Plan  Continue lisinopril-HCTZ and amolodipine              SAY Delaney  6/15/2023            Lactation Consult/Social Work referral

## 2023-06-15 NOTE — DISCHARGE SUMMARY
Inpatient Discharge Summary    BRIEF OVERVIEW  Admitting Provider: Forrest Mistry DO  Discharge Provider: Forrest Mistry DO  Primary Care Physician at Discharge: Clint Coker Jr., [phone]     Admission Date: 6/14/2023     Discharge Date: 6/15/2023    Primary Discharge Diagnosis  Bladder outlet obstruction hx urinary retention    Secondary Discharge Diagnosis  na    Discharge Disposition     Code Status at Discharge: Full Code    Discharge Medications     Medication List      START taking these medications    cephalexin 500 mg capsule  Commonly known as: KEFLEX  Take 1 capsule (500 mg total) by mouth 2 (two) times a day for 3 days. Please start day prior to cystogram (x-ray)  Dose: 500 mg     oxyCODONE-acetaminophen 5-325 mg per tablet  Commonly known as: PERCOCET  Take 1-2 tablets by mouth every 6 (six) hours as needed for moderate pain for up to 5 days.  Dose: 1-2 tablet     polyethylene glycol 17 gram packet  Commonly known as: MIRALAX  Take 17 g by mouth daily for 7 days.  Dose: 17 g        CONTINUE taking these medications    amLODIPine 10 mg tablet  Commonly known as: NORVASC  Take 1 tablet (10 mg total) by mouth daily.  Dose: 10 mg     atorvastatin 20 mg tablet  Commonly known as: LIPITOR  Take 20 mg by mouth nightly.  Dose: 20 mg     levothyroxine 137 mcg tablet  Commonly known as: SYNTHROID  Take 1 tablet (137 mcg total) by mouth once daily.  Dose: 137 mcg     lisinopriL-hydrochlorothiazide 20-12.5 mg per tablet  Commonly known as: PRINZIDE,ZESTORETIC  Take 1 tablet by mouth daily.  Dose: 1 tablet     metFORMIN 500 mg tablet  Commonly known as: GLUCOPHAGE  Take 1 tablet (500 mg total) by mouth 2 (two) times a day with meals.  Dose: 500 mg     tamsulosin 0.4 mg capsule  Commonly known as: FLOMAX  Take 0.4 mg by mouth nightly.  Dose: 0.4 mg            Active Issues Requiring Follow-up  Issue: Asher catheter  Responsible Individual: Dr Mistry   What is Needed: Follow up with Dr Mistry and  cystogram  Follow-up Appointments Arranged: Cystogram - 6/26/23 @ 915am [facility]; Follow up with Dr Mistry 6/27/23 @ 1230pm in the [place] office    Outpatient Follow-Up            In 4 days NSQ MRI2 (3T) [facility] - Radiology    In 1 week RH FL1 OP [facility] - Radiology - Diagnostic    In 1 month NSQ CT1 [facility]iology    In 3 months Clint Coker Jr, DO [facility] [place]            Test Results Pending at Discharge  Unresulted Labs (From admission, onward)     Start     Ordered    06/15/23 0600  Basic metabolic panel  Daily      Question:  Release to patient  Answer:  Immediate   Start Status   06/16/23 0600 Scheduled   06/17/23 0600 Scheduled   06/18/23 0600 Scheduled       06/14/23 1523    06/15/23 0600  CBC  Daily      Question:  Release to patient  Answer:  Immediate   Start Status   06/16/23 0600 Scheduled   06/17/23 0600 Scheduled   06/18/23 0600 Scheduled       06/14/23 1523    06/15/23 0500  Creatinine, body fluid Peritoneal Fluid  Once        Comments: Please send from GRAY Drain     Question:  Release to patient  Answer:  Immediate    06/14/23 1523    06/14/23 1417  Pathology Tissue Exam  RELEASE UPON ORDERING        Comments: Specimen 1: Pre-op diagnosis:  urinary retention  gross hematuria     Question:  Release to patient  Answer:  Immediate    06/14/23 1417                DETAILS OF HOSPITAL STAY    Presenting Problem/History of Present Illness  Acute urinary retention [R33.8]  Gross hematuria [R31.0]  Urinary retention [R33.9]      Hospital Course  S/p Robotic simple prostatectomy    Operative Procedures Performed  Procedure(s):  Robotic simple prostatectomy  Consults: see EMR    Pertinent Test Results: see emr

## 2023-06-15 NOTE — NURSING NOTE
Pt discharged. IV access removed and discharge instructions provided. Pt verbalized understanding of instructions. Pt to be transported to [facility] via wheelchair where he will be me by daughter and taken home via private vehicle.

## 2023-06-15 NOTE — PROGRESS NOTES
UROLOGY DAILY PROGRESS NOTE  Date: 6/15/2023     Subjective    No acute events overnight  Patient has been feeling well  CBI clamped at 5 AM  No output from GRAY drain    Objective       Physical Exam:  Vital signs in last 24 hours:  Temp:  [35.9 °C (96.7 °F)-36.6 °C (97.8 °F)] 36.6 °C (97.8 °F)  Heart Rate:  [55-86] 59  Resp:  [10-20] 16  BP: (147-188)/() 158/83        Intake/Output Summary (Last 24 hours) at 6/15/2023 0854  Last data filed at 6/15/2023 0600  Gross per 24 hour   Intake 2200 ml   Output 5785 ml   Net -3585 ml     Intake/Output this shift:  No intake/output data recorded.      Physical Exam  General: No acute distress  Heart: Regular rate  Lungs: Non labored breathing  GI: Soft, dressings dry, GRAY in place  :  Asher clear yellow off CBI    Labs:  CBC Results       06/15/23 05/24/23 04/10/23     0506 0820 2135    WBC 11.49 7.11 11.18    RBC 4.34 4.60 4.49    HGB 13.1 14.0 13.6    HCT 39.1 41.3 40.1    MCV 90.1 89.8 89.3    MCH 30.2 30.4 30.3    MCHC 33.5 33.9 33.9     178 143        BMP Results       06/15/23 05/24/23 04/26/23     0506 0820 0727     138 139    K 4.3 3.9 3.9    Cl 105 100 103    CO2 27 29 27    Glucose 152 241 136    BUN 14 16 11    Creatinine 0.9 1.1 1.0    Calcium 8.4 9.4 9.3    Anion Gap 6 9 9    EGFR >60.0 >60.0 >60.0        UA Results       05/04/23 2247    Color Yellow    Clarity Clear    Glucose Negative    Bilirubin Negative    Ketones Negative    Sp Grav 1.010    Blood +1    Ph 6.5    Protein Trace    Urobilinogen 0.2    Nitrite Negative    Leuk Est Negative    WBC 0 TO 3    RBC 36 TO 50    Bacteria None Seen         Comment for Blood at 2247 on 05/04/23: The sensitivity of the occult blood test is equivalent to approximately 4 intact RBC/HPF.    Comment for Protein at 2247 on 05/04/23: Trace False Positive Protein can be seen with alkaline or highly buffered urines or urine with high specific gravity. Suggest clinical correlation.    Comment for Leuk Est  at 2247 on 05/04/23: Results can be falsely negative due to high specific gravity, some antibiotics, glucose >3 g/dl, or WBC other than neutrophils.        Microbiology Results     Procedure Component Value Units Date/Time    Urine culture (clean catch) [[id number]]  (Normal) Collected: 05/24/23 0820    Specimen: Urine, Clean Catch Updated: 05/25/23 0958     Urine Culture No Growth (Limit of detection 1000 cfu/mL)          Imaging: All  Imaging was personally reviewed      Assessment/Plan:    68 y/o M w ARMSTRONG/hx urinary retention s/p Robotic simple prostatectomy 6/14/23    DC GRAY drain  Off CBI - disconnected  Ambulate  Reg diet  D/c this afternoon with domínguez catheter in place    Forrets Mistry DO, MBS, FACOS  Urologic Oncology and Complex General Urology  [facility]  [phone]

## 2023-06-15 NOTE — PLAN OF CARE
Care Coordination Discharge Plan Summary    Admission Assessment Summary    General Information  Readmission Within the last 30 days: no previous admission in last 30 days  Does patient have a :    Patient-Specific Goals (include timeframe): To go home    Living Arrangements  Arrived From: home  Current Living Arrangements: home  People in Home: spouse, child(elmer), adult  Home Accessibility:    Living Arrangement Comments: Pt sts lives Indep at home with wife and daughter. no steps to enter.    Social Determinants of Health - Screenings  Housing Stability  In the last 12 months, was there a time when you were not able to pay the mortgage or rent on time?: No  In the last 12 months, how many places have you lived?: 1  In the last 12 months, was there a time when you did not have a steady place to sleep or slept in a shelter (including now)?: No  Financial Resource Strain  How hard is it for you to pay for the very basics like food, housing, medical care, and heating?: Not hard at all  Transportation Needs  In the past 12 months, has lack of transportation kept you from medical appointments or from getting medications?: No  In the past 12 months, has lack of transportation kept you from meetings, work, or from getting things needed for daily living?: No    Functional Status Prior to Admission  Assistive Device/Animal Currently Used at Home: blood pressure cuff  Functional Status Comments: Indep with mobility no assist device  IADL Comments: Indep IADLs    Discharge Needs Assessment    Concerns to be Addressed: no discharge needs identified  Current Discharge Risk:    Anticipated Changes Related to Illness: none    Discharge Plan Summary    Patient Choice  Offered/Gave Vendor List: no  Patient's Choice of Community Agency(s): n/a       Discharge Plan:  Disposition/Destination: Home  / Home

## 2023-06-16 ENCOUNTER — APPOINTMENT (EMERGENCY)
Dept: RADIOLOGY | Facility: HOSPITAL | Age: 68
End: 2023-06-16
Attending: EMERGENCY MEDICINE
Payer: COMMERCIAL

## 2023-06-16 VITALS
OXYGEN SATURATION: 94 % | HEART RATE: 64 BPM | SYSTOLIC BLOOD PRESSURE: 151 MMHG | WEIGHT: 183.1 LBS | TEMPERATURE: 99.4 F | DIASTOLIC BLOOD PRESSURE: 93 MMHG | RESPIRATION RATE: 18 BRPM | BODY MASS INDEX: 27.12 KG/M2 | HEIGHT: 69 IN

## 2023-06-16 PROCEDURE — 36415 COLL VENOUS BLD VENIPUNCTURE: CPT | Performed by: EMERGENCY MEDICINE

## 2023-06-16 PROCEDURE — 63600105 HC IODINE BASED CONTRAST: Mod: JW | Performed by: EMERGENCY MEDICINE

## 2023-06-16 PROCEDURE — 74177 CT ABD & PELVIS W/CONTRAST: CPT | Mod: ME

## 2023-06-16 PROCEDURE — 87040 BLOOD CULTURE FOR BACTERIA: CPT | Mod: 91 | Performed by: EMERGENCY MEDICINE

## 2023-06-16 RX ADMIN — IOHEXOL 80 ML: 350 INJECTION, SOLUTION INTRAVENOUS at 00:17

## 2023-06-16 NOTE — DISCHARGE INSTRUCTIONS
Follow closely with your regular doctor.   Return to the ER for worsening in any way at all.  Continue your medications as previously prescribed.

## 2023-06-16 NOTE — ED PROVIDER NOTES
Emergency Medicine Note  HPI   HISTORY OF PRESENT ILLNESS     Patient denies chest pain, shortness of breath, nausea, vomiting, diarrhea.  He does report some mild abdominal discomfort, but nothing that seems to be out of proportion to having recent surgery.  Patient did not take anything for the pain. Pt here with fever - reports that he had prostate surgery. Pt has no complaints - aside from having a temp of 103 at home.              Patient History   PAST HISTORY     Reviewed from Nursing Triage:       Past Medical History:   Diagnosis Date   • BPH (benign prostatic hyperplasia)    • COVID-19 vaccination not done    • Fatty liver    • Graves disease    • Hypertension    • Hypothyroidism    • Lipid disorder    • Liver disease    • Male erectile dysfunction    • Type 2 diabetes mellitus (CMS/HCC)     pt denies       Past Surgical History:   Procedure Laterality Date   • CHOLECYSTECTOMY  2020   • COLONOSCOPY     • EYE SURGERY Bilateral     from Graves   • PROSTATE BIOPSY  2023   • SINUS SURGERY         Family History   Problem Relation Age of Onset   • Other Biological Mother         natural causes -  at age 75   • Hypertension Biological Father    • Other Biological Father         natural causes -  at age 78   • No Known Problems Biological Sister    • Lung cancer Neg Hx        Social History     Tobacco Use   • Smoking status: Every Day     Packs/day: 0.75     Years: 40.00     Pack years: 30.00     Types: Cigarettes     Start date:      Passive exposure: Current   • Smokeless tobacco: Never   Vaping Use   • Vaping Use: Never used   Substance Use Topics   • Alcohol use: No   • Drug use: No         Review of Systems   REVIEW OF SYSTEMS     Review of Systems   Constitutional: Positive for fever. Negative for chills.   HENT: Negative for congestion, rhinorrhea and sore throat.    Respiratory: Negative for cough and shortness of breath.    Cardiovascular: Negative for chest pain.    Gastrointestinal: Negative for abdominal pain, diarrhea and nausea.   Genitourinary: Negative for difficulty urinating.   Musculoskeletal: Negative for arthralgias, back pain, gait problem and neck pain.   Skin: Negative for rash.   Neurological: Negative for dizziness, light-headedness and headaches.         VITALS     ED Vitals    Date/Time Temp Pulse Resp BP SpO2 [facility]   06/16/23 0157 37.4 °C (99.4 °F) 64 18 151/93 94 % DMA   06/15/23 2129 37.6 °C (99.7 °F) 75 16 151/81 96 % MJS   06/15/23 2037 37.2 °C (98.9 °F) -- 17 157/83 97 % EDC        Pulse Ox %: 97 % (06/15/23 2216)  Pulse Ox Interpretation: Normal (06/15/23 2216)  Heart Rate: 75 (06/15/23 2216)  Rhythm Strip Interpretation: Normal Sinus Rhythm (06/15/23 2216)     Physical Exam   PHYSICAL EXAM     Physical Exam  Vitals and nursing note reviewed.   Constitutional:       Appearance: He is well-developed.   HENT:      Head: Normocephalic and atraumatic.   Eyes:      Conjunctiva/sclera: Conjunctivae normal.      Pupils: Pupils are equal, round, and reactive to light.   Cardiovascular:      Rate and Rhythm: Normal rate and regular rhythm.      Heart sounds: No murmur heard.  Pulmonary:      Effort: Pulmonary effort is normal. No respiratory distress.      Breath sounds: Normal breath sounds.   Abdominal:      General: Bowel sounds are normal.      Palpations: Abdomen is soft.      Tenderness: There is no abdominal tenderness.      Comments: Bandages over abdominal wall incisions; very mild tenderness to palpation   Genitourinary:     Comments: Asher catheter in place draining punch colored urine.  Musculoskeletal:         General: Normal range of motion.      Cervical back: Normal range of motion and neck supple.   Skin:     General: Skin is warm and dry.   Neurological:      Mental Status: He is alert and oriented to person, place, and time.           PROCEDURES     Procedures     DATA     Results     Procedure Component Value Units Date/Time    Blood Culture  Blood, Venous [[id number]] Collected: 06/16/23 0207    Specimen: Blood, Venous Updated: 06/16/23 0212    Blood Culture Blood, Venous [[id number]] Collected: 06/16/23 0208    Specimen: Blood, Venous Updated: 06/16/23 0212    SARS-CoV-2 (COVID-19), PCR Nasopharynx [[id number]]  (Normal) Collected: 06/15/23 2053    Specimen: Nasopharyngeal Swab from Nasopharynx Updated: 06/15/23 2146    Narrative:      The following orders were created for panel order SARS-CoV-2 (COVID-19), PCR Nasopharynx.  Procedure                               Abnormality         Status                     ---------                               -----------         ------                     SARS-COV-2 (COVID-19)/ F...[[id number]]  Normal              Final result                 Please view results for these tests on the individual orders.    SARS-COV-2 (COVID-19)/ FLU A/B, AND RSV, PCR Nasopharynx [[id number]]  (Normal) Collected: 06/15/23 2053    Specimen: Nasopharyngeal Swab from Nasopharynx Updated: 06/15/23 2146     SARS-CoV-2 (COVID-19) Negative     Influenza A Negative     Influenza B Negative     Respiratory Syncytial Virus Negative    Narrative:      Testing performed using real-time PCR for detection of COVID-19. EUA approved validation studies performed on site.     Urinalysis with Reflex Culture [[id number]]  (Abnormal) Collected: 06/15/23 2134    Specimen: Urine, Clean Catch Updated: 06/15/23 2144    Narrative:      The following orders were created for panel order Urinalysis with Reflex Culture.  Procedure                               Abnormality         Status                     ---------                               -----------         ------                     UA Reflex to Culture (Ma...[[id number]]  Abnormal            Final result               UA Microscopic[[id number]]               Abnormal            Final result                 Please view results for these tests on the individual orders.    UA Reflex to Culture (Macroscopic)  [FreeTextEntry1] : Annular lesion with raised border to face most consistent with tinea (ring worm).  Possible nummular eczema will be considered if lesion does not resolve with antifungal. .\par Treat with Ketaconazole cream BID X 2 weeks.\par Call if not improved.\par Nail, skin bitting habit, anxiety related. Continue with behavioral therapy, psychiatrist. Discussed diversions, when has urge to bite to rub fingers with Aquafor.    [[id number]]  (Abnormal) Collected: 06/15/23 2134    Specimen: Urine, Clean Catch Updated: 06/15/23 2144     Color, Urine Brown     Clarity, Urine Turbid     Specific Gravity, Urine 1.026     pH, Urine 6.5     Leukocyte Esterase +2     Nitrite, Urine Negative     Protein, Urine +3     Glucose, Urine Trace mg/dL      Ketones, Urine Negative mg/dL      Urobilinogen, Urine 0.2 EU/dL      Bilirubin, Urine Negative mg/dL      Blood, Urine +3     Comment: The sensitivity of the occult blood test is equivalent to approximately 4 intact RBC/HPF.       UA Microscopic [[id number]]  (Abnormal) Collected: 06/15/23 2134    Specimen: Urine, Clean Catch Updated: 06/15/23 2144     RBC, Urine Too Numerous To Count /HPF      WBC, Urine 35 TO 50 /HPF      Squamous Epithelial None Seen /hpf      Hyaline Cast None Seen /lpf      Bacteria, Urine None Seen /HPF      AMORPHOUS CRYSTALS Rare /hpf      Mucus +2 /LPF     Comprehensive metabolic panel [[id number]]  (Abnormal) Collected: 06/15/23 2103    Specimen: Blood, Venous Updated: 06/15/23 2133     Sodium 136 mEQ/L      Potassium 3.4 mEQ/L      Comment: Results obtained on plasma. Plasma Potassium values may be up to 0.4 mEQ/L less than serum values. The differences may be greater for patients with high platelet or white cell counts.        Chloride 105 mEQ/L      CO2 25 mEQ/L      BUN 19 mg/dL      Creatinine 1.0 mg/dL      Glucose 206 mg/dL      Calcium 8.5 mg/dL      AST (SGOT) 26 IU/L      ALT (SGPT) 26 IU/L      Alkaline Phosphatase 54 IU/L      Total Protein 7.5 g/dL      Comment: Test performed on plasma which typically contains approximately 0.4 g/dL more protein than serum.        Albumin 3.6 g/dL      Bilirubin, Total 0.4 mg/dL      eGFR >60.0 mL/min/1.73m*2      Anion Gap 6 mEQ/L     CBC and differential [[id number]]  (Abnormal) Collected: 06/15/23 2103    Specimen: Blood, Venous Updated: 06/15/23 2111     WBC 11.48 K/uL      RBC 4.53 M/uL      Hemoglobin 13.7 g/dL      Hematocrit  40.2 %      MCV 88.7 fL      MCH 30.2 pg      MCHC 34.1 g/dL      RDW 12.8 %      Platelets 160 K/uL      MPV 12.2 fL      Differential Type Auto     nRBC 0.0 %      Immature Granulocytes 0.3 %      Neutrophils 77.2 %      Lymphocytes 12.3 %      Monocytes 6.4 %      Eosinophils 3.5 %      Basophils 0.3 %      Immature Granulocytes, Absolute 0.04 K/uL      Neutrophils, Absolute 8.85 K/uL      Lymphocytes, Absolute 1.41 K/uL      Monocytes, Absolute 0.74 K/uL      Eosinophils, Absolute 0.40 K/uL      Basophils, Absolute 0.04 K/uL           Imaging Results          CT ABDOMEN PELVIS WITH IV CONTRAST (Preliminary result)  Result time 06/16/23 01:52:28    Preliminary Interpretation      Patient Name: Rhys Campos  Exam(s): a/p standard CT  MR#: [id number]      Preliminary Impression:    Comparison studies from 2020 and pelvic MRI dated 2/27/2023. Patchy opacities seen on the first images of the lung bases suggesting the possibility of pneumonic infiltrate. However respiratory motion limits this evaluation. The heart is not enlarged. No effusions.    Patient is status post prostatectomy with a Asher catheter in the bladder base extending into the bladder neck. Wall thickness of the bladder cannot be assessed due to lack of filling. Soft tissue swelling is seen surrounding this area and a small amount of air is observed groin, right greater than left, consistent with a history of recent surgery. Surgical skin staples are also present in the anterior abdominal wall, likely a laparoscopic procedure. No evidence of abscess formation.    Additional findings: The liver is at the upper limits of normal in size. The gallbladder is been surgically removed in the interval w  ith improvement of previously noted biliary dilatation. The remaining solid viscera are unremarkable.    The kidneys take up and excrete contrast symmetrically without hydronephrosis or perinephric fat stranding. Ureters are normal in caliber.    Bowel is  normal in caliber and wall thickness without surrounding fat stranding. The appendix is not identified. The cecum is located in the anterior midabdomen consistent with mobile cecum. Diverticula are observed in left colon without evidence of diverticulitis at this time. Mildly enlarged periaortic lymph nodes are present without change.    The osseous structures are unchanged. The abdominal aorta is normal in caliber except for mild ectatic changes in the infrarenal abdominal aorta.      Interpreted by: Joaquín Burnett MD, Jun 16, 2023 01:18 AM                              No orders to display       Scoring tools                                  ED Course & MDM   MDM / ED COURSE / CLINICAL IMPRESSION / DISPO     Medical Decision Making  67-year-old to the ER planing of fever; possible causes patient's symptoms considered including but not limited to postop infection, UTI, viral illness, etc.  Labs reviewed.  Patient remains asymptomatic without complaints and in no acute distress.  Case discussed with urology on-call (Dr. Rob) regarding patient.  Recommended patient discharged home and follow-up closely as an outpatient.  Test results discussed with patient and family at length.  Patient amenable to discharge home, advised to return to the ER for worsening or other concerns and to follow closely with his urologist.    Amount and/or Complexity of Data Reviewed  Labs: ordered. Decision-making details documented in ED Course.  Radiology: ordered and independent interpretation performed. Decision-making details documented in ED Course.      Risk  Prescription drug management.          ED Course as of 06/16/23 0429 Fri Jun 16, 2023   0427 CBC and differential(!) [RP]   0427 Comprehensive metabolic panel(!) [RP]   0427 Urinalysis with Reflex Culture(!) [RP]   0427 CT ABDOMEN PELVIS WITH IV CONTRAST [RP]      ED Course User Index  [RP] Michelle Jones,      Clinical Impression      Fever, unspecified fever cause      _________________     ED Disposition   Discharge                   Michelle Jones,   06/16/23 0430

## 2023-06-16 NOTE — ANESTHESIA POSTPROCEDURE EVALUATION
Patient: Rhys Campos    Procedure Summary     Date: 06/14/23 Room / Location:  OR 1 / [facility] OR    Anesthesia Start: 1241 Anesthesia Stop: 1517    Procedure: Robotic simple prostatectomy Diagnosis:       Acute urinary retention      Gross hematuria      (urinary retention)      (gross hematuria)    Surgeons: Forrest Mistry DO Responsible Provider: Vargas Diallo MD    Anesthesia Type: general ASA Status: 3          Anesthesia Type: general  PACU Vitals  6/14/2023 1513 - 6/14/2023 1613      6/14/2023  1525 6/14/2023  1530 6/14/2023  1540 6/14/2023  1550    BP: 164/99 164/107 184/105 182/93    Temp: 35.9 °C (96.7 °F) -- -- --    Pulse: 68 58 75 65    Resp: 14 14 20 12    SpO2: 95 % 95 % 94 % 96 %              6/14/2023  1600 6/14/2023  1610          BP: 188/85 162/98      Temp: -- --      Pulse: 64 67      Resp: 18 13      SpO2: 95 % 96 %              Anesthesia Post Evaluation    Pain management: adequate  Patient location during evaluation: PACU  Patient participation: complete - patient participated  Level of consciousness: awake and alert  Cardiovascular status: acceptable  Airway Patency: adequate  Respiratory status: acceptable  Hydration status: acceptable  Anesthetic complications: no

## 2023-06-17 LAB — BACTERIA UR CULT: NORMAL

## 2023-06-21 LAB
BACTERIA BLD CULT: NORMAL
BACTERIA BLD CULT: NORMAL

## 2023-06-26 ENCOUNTER — HOSPITAL ENCOUNTER (OUTPATIENT)
Dept: RADIOLOGY | Facility: HOSPITAL | Age: 68
Discharge: HOME | End: 2023-06-26
Attending: UROLOGY
Payer: COMMERCIAL

## 2023-06-26 DIAGNOSIS — R33.8 OTHER RETENTION OF URINE: ICD-10-CM

## 2023-06-26 PROCEDURE — 74430 CONTRAST X-RAY BLADDER: CPT

## 2023-06-26 PROCEDURE — BT101ZZ FLUOROSCOPY OF BLADDER USING LOW OSMOLAR CONTRAST: ICD-10-PCS | Performed by: RADIOLOGY

## 2023-06-26 PROCEDURE — 36100345 HC PROCEDURE ROOM 45MIN

## 2023-07-10 ENCOUNTER — HOSPITAL ENCOUNTER (OUTPATIENT)
Dept: RADIOLOGY | Facility: CLINIC | Age: 68
Discharge: HOME | End: 2023-07-10
Attending: FAMILY MEDICINE
Payer: COMMERCIAL

## 2023-07-10 DIAGNOSIS — M89.9 BONE LESION: ICD-10-CM

## 2023-07-20 ENCOUNTER — HOSPITAL ENCOUNTER (OUTPATIENT)
Dept: RADIOLOGY | Facility: CLINIC | Age: 68
Discharge: HOME | End: 2023-07-20
Attending: FAMILY MEDICINE
Payer: COMMERCIAL

## 2023-07-20 DIAGNOSIS — Z12.2 SCREENING FOR LUNG CANCER: ICD-10-CM

## 2023-07-20 DIAGNOSIS — Z72.0 TOBACCO ABUSE: ICD-10-CM

## 2023-07-20 DIAGNOSIS — R91.8 MULTIPLE LUNG NODULES ON CT: ICD-10-CM

## 2023-07-20 PROCEDURE — 71271 CT THORAX LUNG CANCER SCR C-: CPT | Mod: GA

## 2023-09-15 ENCOUNTER — APPOINTMENT (OUTPATIENT)
Dept: LAB | Age: 68
End: 2023-09-15
Attending: FAMILY MEDICINE
Payer: COMMERCIAL

## 2023-09-15 DIAGNOSIS — E11.9 DIET-CONTROLLED DIABETES MELLITUS (CMS/HCC): ICD-10-CM

## 2023-09-15 DIAGNOSIS — D69.6 PLATELETS DECREASED (CMS/HCC): ICD-10-CM

## 2023-09-15 DIAGNOSIS — E03.8 OTHER SPECIFIED HYPOTHYROIDISM: ICD-10-CM

## 2023-09-15 DIAGNOSIS — E78.00 PURE HYPERCHOLESTEROLEMIA: ICD-10-CM

## 2023-09-15 LAB
ALBUMIN SERPL-MCNC: 4.2 G/DL (ref 3.5–5.7)
ALP SERPL-CCNC: 58 IU/L (ref 34–125)
ALT SERPL-CCNC: 17 IU/L (ref 7–52)
ANION GAP SERPL CALC-SCNC: 5 MEQ/L (ref 3–15)
AST SERPL-CCNC: 20 IU/L (ref 13–39)
BASOPHILS # BLD: 0.02 K/UL (ref 0.01–0.1)
BASOPHILS NFR BLD: 0.3 %
BILIRUB SERPL-MCNC: 0.5 MG/DL (ref 0.3–1.2)
BUN SERPL-MCNC: 14 MG/DL (ref 7–25)
CALCIUM SERPL-MCNC: 9.3 MG/DL (ref 8.6–10.3)
CHLORIDE SERPL-SCNC: 106 MEQ/L (ref 98–107)
CHOLEST SERPL-MCNC: 152 MG/DL
CO2 SERPL-SCNC: 31 MEQ/L (ref 21–31)
CREAT SERPL-MCNC: 0.9 MG/DL (ref 0.7–1.3)
DIFFERENTIAL METHOD BLD: ABNORMAL
EOSINOPHIL # BLD: 0.25 K/UL (ref 0.04–0.54)
EOSINOPHIL NFR BLD: 4.1 %
ERYTHROCYTE [DISTWIDTH] IN BLOOD BY AUTOMATED COUNT: 13.1 % (ref 11.6–14.4)
EST. AVERAGE GLUCOSE BLD GHB EST-MCNC: 148 MG/DL
GFR SERPL CREATININE-BSD FRML MDRD: >60 ML/MIN/1.73M*2
GLUCOSE SERPL-MCNC: 106 MG/DL (ref 70–99)
HBA1C MFR BLD: 6.8 %
HCT VFR BLDCO AUTO: 40.9 % (ref 40.1–51)
HDLC SERPL-MCNC: 35 MG/DL
HDLC SERPL: 4.3 {RATIO}
HGB BLD-MCNC: 13.3 G/DL (ref 13.7–17.5)
IMM GRANULOCYTES # BLD AUTO: 0.01 K/UL (ref 0–0.08)
IMM GRANULOCYTES NFR BLD AUTO: 0.2 %
LDLC SERPL CALC-MCNC: 95 MG/DL
LYMPHOCYTES # BLD: 2.3 K/UL (ref 1.2–3.5)
LYMPHOCYTES NFR BLD: 38 %
MCH RBC QN AUTO: 30.4 PG (ref 28–33.2)
MCHC RBC AUTO-ENTMCNC: 32.5 G/DL (ref 32.2–36.5)
MCV RBC AUTO: 93.4 FL (ref 83–98)
MONOCYTES # BLD: 0.47 K/UL (ref 0.3–1)
MONOCYTES NFR BLD: 7.8 %
NEUTROPHILS # BLD: 3.01 K/UL (ref 1.7–7)
NEUTS SEG NFR BLD: 49.6 %
NONHDLC SERPL-MCNC: 117 MG/DL
NRBC BLD-RTO: 0 %
PDW BLD AUTO: 14 FL (ref 9.4–12.4)
PLATELET # BLD AUTO: 159 K/UL (ref 150–350)
POTASSIUM SERPL-SCNC: 4.4 MEQ/L (ref 3.5–5.1)
PROT SERPL-MCNC: 7.4 G/DL (ref 6–8.2)
RBC # BLD AUTO: 4.38 M/UL (ref 4.5–5.8)
SODIUM SERPL-SCNC: 142 MEQ/L (ref 136–145)
TRIGL SERPL-MCNC: 109 MG/DL
TSH SERPL DL<=0.05 MIU/L-ACNC: 1.24 MIU/L (ref 0.34–5.6)
WBC # BLD AUTO: 6.06 K/UL (ref 3.8–10.5)

## 2023-09-15 PROCEDURE — 85025 COMPLETE CBC W/AUTO DIFF WBC: CPT

## 2023-09-15 PROCEDURE — 80061 LIPID PANEL: CPT

## 2023-09-15 PROCEDURE — 83036 HEMOGLOBIN GLYCOSYLATED A1C: CPT

## 2023-09-15 PROCEDURE — 84443 ASSAY THYROID STIM HORMONE: CPT

## 2023-09-15 PROCEDURE — 36415 COLL VENOUS BLD VENIPUNCTURE: CPT

## 2023-09-15 PROCEDURE — 80053 COMPREHEN METABOLIC PANEL: CPT

## 2023-09-20 PROBLEM — D64.9 NORMOCYTIC ANEMIA: Status: ACTIVE | Noted: 2023-09-20

## 2023-09-21 ENCOUNTER — OFFICE VISIT (OUTPATIENT)
Dept: PRIMARY CARE | Facility: CLINIC | Age: 68
End: 2023-09-21
Payer: COMMERCIAL

## 2023-09-21 VITALS
WEIGHT: 178.8 LBS | RESPIRATION RATE: 17 BRPM | HEART RATE: 80 BPM | BODY MASS INDEX: 26.4 KG/M2 | TEMPERATURE: 97.7 F | SYSTOLIC BLOOD PRESSURE: 138 MMHG | DIASTOLIC BLOOD PRESSURE: 78 MMHG | OXYGEN SATURATION: 98 %

## 2023-09-21 DIAGNOSIS — I10 ESSENTIAL HYPERTENSION: ICD-10-CM

## 2023-09-21 DIAGNOSIS — Z90.79 S/P PROSTATECTOMY: ICD-10-CM

## 2023-09-21 DIAGNOSIS — E03.8 OTHER SPECIFIED HYPOTHYROIDISM: ICD-10-CM

## 2023-09-21 DIAGNOSIS — K63.5 POLYP OF COLON, UNSPECIFIED PART OF COLON, UNSPECIFIED TYPE: ICD-10-CM

## 2023-09-21 DIAGNOSIS — R91.8 MULTIPLE LUNG NODULES ON CT: ICD-10-CM

## 2023-09-21 DIAGNOSIS — E78.00 PURE HYPERCHOLESTEROLEMIA: ICD-10-CM

## 2023-09-21 DIAGNOSIS — R80.9 TYPE 2 DIABETES MELLITUS WITH MICROALBUMINURIA, WITHOUT LONG-TERM CURRENT USE OF INSULIN (CMS/HCC): Primary | ICD-10-CM

## 2023-09-21 DIAGNOSIS — L72.9 CYST OF SKIN: ICD-10-CM

## 2023-09-21 DIAGNOSIS — I77.811 AORTIC ECTASIA, ABDOMINAL (CMS/HCC): ICD-10-CM

## 2023-09-21 DIAGNOSIS — D64.9 NORMOCYTIC ANEMIA: ICD-10-CM

## 2023-09-21 DIAGNOSIS — E11.29 TYPE 2 DIABETES MELLITUS WITH MICROALBUMINURIA, WITHOUT LONG-TERM CURRENT USE OF INSULIN (CMS/HCC): Primary | ICD-10-CM

## 2023-09-21 PROCEDURE — 3075F SYST BP GE 130 - 139MM HG: CPT | Performed by: FAMILY MEDICINE

## 2023-09-21 PROCEDURE — 90694 VACC AIIV4 NO PRSRV 0.5ML IM: CPT | Performed by: FAMILY MEDICINE

## 2023-09-21 PROCEDURE — 90471 IMMUNIZATION ADMIN: CPT | Performed by: FAMILY MEDICINE

## 2023-09-21 PROCEDURE — 99214 OFFICE O/P EST MOD 30 MIN: CPT | Mod: 25 | Performed by: FAMILY MEDICINE

## 2023-09-21 PROCEDURE — 3008F BODY MASS INDEX DOCD: CPT | Performed by: FAMILY MEDICINE

## 2023-09-21 PROCEDURE — 3078F DIAST BP <80 MM HG: CPT | Performed by: FAMILY MEDICINE

## 2023-09-21 RX ORDER — AMLODIPINE BESYLATE 10 MG/1
10 TABLET ORAL DAILY
Qty: 90 TABLET | Refills: 1 | Status: SHIPPED | OUTPATIENT
Start: 2023-09-21 | End: 2024-03-09 | Stop reason: SDUPTHER

## 2023-09-21 RX ORDER — ATORVASTATIN CALCIUM 20 MG/1
20 TABLET, FILM COATED ORAL NIGHTLY
Qty: 90 TABLET | Refills: 3 | Status: SHIPPED | OUTPATIENT
Start: 2023-09-21 | End: 2024-03-26

## 2023-09-21 RX ORDER — METFORMIN HYDROCHLORIDE 500 MG/1
500 TABLET ORAL 2 TIMES DAILY WITH MEALS
Qty: 180 TABLET | Refills: 1 | Status: SHIPPED | OUTPATIENT
Start: 2023-09-21 | End: 2024-01-16

## 2023-09-21 RX ORDER — LEVOTHYROXINE SODIUM 137 UG/1
137 TABLET ORAL EVERY MORNING
Qty: 90 TABLET | Refills: 3 | Status: SHIPPED | OUTPATIENT
Start: 2023-09-21 | End: 2024-09-30 | Stop reason: SDUPTHER

## 2023-09-21 ASSESSMENT — ENCOUNTER SYMPTOMS
SHORTNESS OF BREATH: 0
CONSTIPATION: 0
PALPITATIONS: 0
CHEST TIGHTNESS: 0
BLOOD IN STOOL: 0
DIARRHEA: 0
DIZZINESS: 0
LIGHT-HEADEDNESS: 0
ANAL BLEEDING: 0
HEMATURIA: 0

## 2023-09-21 NOTE — ASSESSMENT & PLAN NOTE
- Well-controlled today, but even better at home in the 110s to 120s over 70s to low 80s  -Continue current medications

## 2023-09-21 NOTE — ASSESSMENT & PLAN NOTE
- Doing well after this and he will see the urologist next week as scheduled-they will make sure I am sent a consult note

## 2023-09-21 NOTE — ASSESSMENT & PLAN NOTE
- Although I would typically order a follow-up ultrasound this year, he already had a CT of the abdomen/pelvis which showed stability at 2.5 cm compared to 2022, so simply repeat in another year via ultrasound

## 2023-09-21 NOTE — ASSESSMENT & PLAN NOTE
- His A1c is now very well controlled so continue current dosage of metformin, which he is tolerating well and repeat labs in 6 months  -encouraged him to see alonzo alejandro

## 2023-09-21 NOTE — ASSESSMENT & PLAN NOTE
- TSH is excellent and his thyroid exam is normal so continue current dosage of levothyroxine and repeat in 1 year

## 2023-09-21 NOTE — ASSESSMENT & PLAN NOTE
- LDL is much better controlled and now less than 100, so we will continue current dosage of atorvastatin and repeat in 1 year

## 2023-09-21 NOTE — ASSESSMENT & PLAN NOTE
"- I am glad that he is seeing the pulmonologist next week and I look forward to reviewing their recommendations.  He remains without any symptoms and also remains very resistant to quitting smoking indicating that he would \"rather die than quit smoking\"  "

## 2023-09-21 NOTE — ASSESSMENT & PLAN NOTE
- His hemoglobin has been low normal for years and this reading is even the same as 2016, so likely that he has values that exist at the lower limit of normal, but we will still repeat with his iron studies in about 1 month

## 2023-09-21 NOTE — PROGRESS NOTES
Subjective      Patient ID: Rhys Campos is a 68 y.o. male.  1955      Here for follow-up of diabetes  -We added metformin 500 mg twice daily last visit and A1c improved from 7.3 down to 6.8%  -was due to see the eye doctor in May, but never did- he will schedule  -His FBG improved from 206 down to 106  -His LDL improved from 117 down to 95.  -has eliminated bread, allowing him to lose weight intentionally  -at gym 2-3 d/wk- no cp, sob, dizziness, lh.     -His TSH is well controlled at 1.24    Prostate cancer  -Status post prostatectomy on 6/14  -denies weak stream, incomplete emptying, or abnormal nocturia (once/night). Seeing Uro next wk    Normocytic anemia  -Hemoglobin slightly reduced from 13.7 down to 13.3  -denies brbpr, hematuria, or epistaxis.     Abnormal CT of the chest-I referred him to pulmonology last visit  -seeing in 4 days  -not coughing at all  -smoking 10 cigs/day    -He was due for his repeat colonoscopy in July- scheduled on 10/26.       The following have been reviewed and updated as appropriate in this visit:   Tobacco  Allergies  Meds  Problems  Surg Hx  Fam Hx       Review of Systems   HENT: Negative for nosebleeds.    Respiratory: Negative for chest tightness and shortness of breath.    Cardiovascular: Negative for chest pain, palpitations and leg swelling.   Gastrointestinal: Negative for anal bleeding, blood in stool, constipation and diarrhea.   Genitourinary: Negative for hematuria.        See hpi   Neurological: Negative for dizziness, syncope and light-headedness.       Objective     Vitals:    09/21/23 1301 09/21/23 1316   BP: 140/80 138/78   BP Location: Left upper arm Left upper arm   Patient Position: Sitting Sitting   Pulse: 80    Resp: 17    Temp: 36.5 °C (97.7 °F)    TempSrc: Temporal    SpO2: 98%    Weight: 81.1 kg (178 lb 12.8 oz)      Body mass index is 26.4 kg/m².    Physical Exam  Constitutional:       General: He is not in acute distress.      Appearance: Normal appearance. He is normal weight. He is not ill-appearing, toxic-appearing or diaphoretic.   HENT:      Head: Normocephalic and atraumatic.   Neck:      Thyroid: No thyroid mass, thyromegaly or thyroid tenderness.   Cardiovascular:      Rate and Rhythm: Normal rate and regular rhythm.      Heart sounds: Normal heart sounds. No murmur heard.     No friction rub. No gallop.   Pulmonary:      Effort: Pulmonary effort is normal. No respiratory distress.      Breath sounds: Normal breath sounds. No stridor. No wheezing, rhonchi or rales.   Abdominal:      General: Bowel sounds are normal. There is no distension.      Palpations: Abdomen is soft. There is no mass.      Tenderness: There is no abdominal tenderness. There is no guarding or rebound.      Hernia: No hernia is present.   Musculoskeletal:      Right lower leg: No edema.      Left lower leg: No edema.   Lymphadenopathy:      Cervical: No cervical adenopathy.   Neurological:      Mental Status: He is alert and oriented to person, place, and time. Mental status is at baseline.   Psychiatric:         Mood and Affect: Mood normal.         Behavior: Behavior normal.         Thought Content: Thought content normal.         Judgment: Judgment normal.         Assessment/Plan   Diagnoses and all orders for this visit:    Type 2 diabetes mellitus with microalbuminuria, without long-term current use of insulin (CMS/[facility]) (Primary)  Comments:  still stongly refusing covid shot despite rec. rec he consider rsv vaccine  Assessment & Plan:  - His A1c is now very well controlled so continue current dosage of metformin, which he is tolerating well and repeat labs in 6 months  -encouraged him to see alonzo Cardenas hypercholesterolemia  Assessment & Plan:  - LDL is much better controlled and now less than 100, so we will continue current dosage of atorvastatin and repeat in 1 year      Essential hypertension  Assessment & Plan:  - Well-controlled today, but even  "better at home in the 110s to 120s over 70s to low 80s  -Continue current medications    Orders:  -     amLODIPine (NORVASC) 10 mg tablet; Take 1 tablet (10 mg total) by mouth daily.    Other specified hypothyroidism  Assessment & Plan:  - TSH is excellent and his thyroid exam is normal so continue current dosage of levothyroxine and repeat in 1 year    Orders:  -     levothyroxine (SYNTHROID) 137 mcg tablet; Take 1 tablet (137 mcg total) by mouth every morning.    Normocytic anemia  Assessment & Plan:  - His hemoglobin has been low normal for years and this reading is even the same as 2016, so likely that he has values that exist at the lower limit of normal, but we will still repeat with his iron studies in about 1 month    Orders:  -     Hemoglobin and hematocrit, blood; Future  -     Iron and TIBC; Future  -     Ferritin; Future    Multiple lung nodules on CT  Assessment & Plan:  - I am glad that he is seeing the pulmonologist next week and I look forward to reviewing their recommendations.  He remains without any symptoms and also remains very resistant to quitting smoking indicating that he would \"rather die than quit smoking\"      Cyst of skin    S/P prostatectomy  Assessment & Plan:  - Doing well after this and he will see the urologist next week as scheduled-they will make sure I am sent a consult note      Polyp of colon, unspecified part of colon, unspecified type  Assessment & Plan:  - Colonoscopy as scheduled on 10/26      Aortic ectasia, abdominal (CMS/HCC)  Assessment & Plan:  - Although I would typically order a follow-up ultrasound this year, he already had a CT of the abdomen/pelvis which showed stability at 2.5 cm compared to 2022, so simply repeat in another year via ultrasound      Other orders  -     Influenza vaccine Quad Adjuvanted 65 and Older (FluAd Quad)  -     atorvastatin (LIPITOR) 20 mg tablet; Take 1 tablet (20 mg total) by mouth nightly.  -     metFORMIN (GLUCOPHAGE) 500 mg tablet; " Take 1 tablet (500 mg total) by mouth 2 (two) times a day with meals.

## 2023-10-13 ENCOUNTER — TELEPHONE (OUTPATIENT)
Dept: PRIMARY CARE | Facility: CLINIC | Age: 68
End: 2023-10-13
Payer: COMMERCIAL

## 2023-10-13 NOTE — TELEPHONE ENCOUNTER
We received a form from [facility], Dr. SILVA would like the patient to be cleared by Cardiologist, I called the patient and left him a message and I faxed the form back to them with Dr. SILVA's note on it    I put paperwork back in Dr. SILVA's bin

## 2023-10-19 DIAGNOSIS — E78.00 PURE HYPERCHOLESTEROLEMIA: ICD-10-CM

## 2023-10-19 RX ORDER — ROSUVASTATIN CALCIUM 10 MG/1
TABLET, COATED ORAL
Qty: 90 TABLET | Refills: 1 | OUTPATIENT
Start: 2023-10-19

## 2023-10-19 NOTE — TELEPHONE ENCOUNTER
Medicine Refill Request    Last Office Visit: 9/21/2023   Last Consult Visit: Visit date not found  Last Telemedicine Visit: Visit date not found    Next Appointment: 3/26/2024      Current Outpatient Medications:     amLODIPine (NORVASC) 10 mg tablet, Take 1 tablet (10 mg total) by mouth daily., Disp: 90 tablet, Rfl: 1    atorvastatin (LIPITOR) 20 mg tablet, Take 1 tablet (20 mg total) by mouth nightly., Disp: 90 tablet, Rfl: 3    levothyroxine (SYNTHROID) 137 mcg tablet, Take 1 tablet (137 mcg total) by mouth every morning., Disp: 90 tablet, Rfl: 3    lisinopriL-hydrochlorothiazide (PRINZIDE,ZESTORETIC) 20-12.5 mg per tablet, take 1 tablet by mouth once daily, Disp: 90 tablet, Rfl: 1    metFORMIN (GLUCOPHAGE) 500 mg tablet, Take 1 tablet (500 mg total) by mouth 2 (two) times a day with meals., Disp: 180 tablet, Rfl: 1    tamsulosin (FLOMAX) 0.4 mg capsule, Take 0.4 mg by mouth nightly., Disp: , Rfl:       BP Readings from Last 3 Encounters:   09/21/23 138/78   06/16/23 (!) 151/93   06/15/23 (!) 158/83       Recent Lab results:  Lab Results   Component Value Date    CHOL 152 09/15/2023   ,   Lab Results   Component Value Date    HDL 35 (L) 09/15/2023   ,   Lab Results   Component Value Date    LDLCALC 95 09/15/2023   ,   Lab Results   Component Value Date    TRIG 109 09/15/2023        Lab Results   Component Value Date    GLUCOSE 106 (H) 09/15/2023   ,   Lab Results   Component Value Date    HGBA1C 6.8 (H) 09/15/2023         Lab Results   Component Value Date    CREATININE 0.9 09/15/2023       Lab Results   Component Value Date    TSH 1.24 09/15/2023           Lab Results   Component Value Date    HGBA1C 6.8 (H) 09/15/2023

## 2023-12-08 DIAGNOSIS — I10 ESSENTIAL HYPERTENSION: ICD-10-CM

## 2023-12-08 RX ORDER — LISINOPRIL AND HYDROCHLOROTHIAZIDE 12.5; 2 MG/1; MG/1
1 TABLET ORAL DAILY
Qty: 90 TABLET | Refills: 1 | Status: SHIPPED | OUTPATIENT
Start: 2023-12-08 | End: 2024-03-09 | Stop reason: SDUPTHER

## 2023-12-08 NOTE — TELEPHONE ENCOUNTER
Lmom with DR SILVA note : Please remind patient to have his labs done as were ordered in September for a 1 month follow-up in October

## 2023-12-08 NOTE — TELEPHONE ENCOUNTER
Please remind patient to have his labs done as were ordered in September for a 1 month follow-up in October

## 2023-12-08 NOTE — TELEPHONE ENCOUNTER
Medicine Refill Request    Last Office Visit: 9/21/2023   Last Consult Visit: Visit date not found  Last Telemedicine Visit: Visit date not found    Next Appointment: 3/26/2024      Current Outpatient Medications:   •  amLODIPine (NORVASC) 10 mg tablet, Take 1 tablet (10 mg total) by mouth daily., Disp: 90 tablet, Rfl: 1  •  atorvastatin (LIPITOR) 20 mg tablet, Take 1 tablet (20 mg total) by mouth nightly., Disp: 90 tablet, Rfl: 3  •  levothyroxine (SYNTHROID) 137 mcg tablet, Take 1 tablet (137 mcg total) by mouth every morning., Disp: 90 tablet, Rfl: 3  •  lisinopriL-hydrochlorothiazide (PRINZIDE,ZESTORETIC) 20-12.5 mg per tablet, take 1 tablet by mouth once daily, Disp: 90 tablet, Rfl: 1  •  metFORMIN (GLUCOPHAGE) 500 mg tablet, Take 1 tablet (500 mg total) by mouth 2 (two) times a day with meals., Disp: 180 tablet, Rfl: 1  •  tamsulosin (FLOMAX) 0.4 mg capsule, Take 0.4 mg by mouth nightly., Disp: , Rfl:       BP Readings from Last 3 Encounters:   09/21/23 138/78   06/16/23 (!) 151/93   06/15/23 (!) 158/83       Recent Lab results:  Lab Results   Component Value Date    CHOL 152 09/15/2023   ,   Lab Results   Component Value Date    HDL 35 (L) 09/15/2023   ,   Lab Results   Component Value Date    LDLCALC 95 09/15/2023   ,   Lab Results   Component Value Date    TRIG 109 09/15/2023        Lab Results   Component Value Date    GLUCOSE 106 (H) 09/15/2023   ,   Lab Results   Component Value Date    HGBA1C 6.8 (H) 09/15/2023         Lab Results   Component Value Date    CREATININE 0.9 09/15/2023       Lab Results   Component Value Date    TSH 1.24 09/15/2023           Lab Results   Component Value Date    HGBA1C 6.8 (H) 09/15/2023

## 2023-12-18 ENCOUNTER — HOSPITAL ENCOUNTER (EMERGENCY)
Facility: HOSPITAL | Age: 68
Discharge: HOME | End: 2023-12-18
Attending: EMERGENCY MEDICINE
Payer: COMMERCIAL

## 2023-12-18 ENCOUNTER — APPOINTMENT (EMERGENCY)
Dept: RADIOLOGY | Facility: HOSPITAL | Age: 68
End: 2023-12-18
Payer: COMMERCIAL

## 2023-12-18 VITALS
BODY MASS INDEX: 25.72 KG/M2 | TEMPERATURE: 98.4 F | SYSTOLIC BLOOD PRESSURE: 148 MMHG | RESPIRATION RATE: 18 BRPM | WEIGHT: 169.7 LBS | DIASTOLIC BLOOD PRESSURE: 78 MMHG | OXYGEN SATURATION: 96 % | HEART RATE: 77 BPM | HEIGHT: 68 IN

## 2023-12-18 DIAGNOSIS — N39.0 LOWER URINARY TRACT INFECTION: ICD-10-CM

## 2023-12-18 DIAGNOSIS — N45.1 EPIDIDYMITIS, RIGHT: Primary | ICD-10-CM

## 2023-12-18 LAB
ALBUMIN SERPL-MCNC: 4 G/DL (ref 3.5–5.7)
ALP SERPL-CCNC: 68 IU/L (ref 34–125)
ALT SERPL-CCNC: 14 IU/L (ref 7–52)
AMORPH CRY #/AREA URNS HPF: ABNORMAL /HPF
ANION GAP SERPL CALC-SCNC: 9 MEQ/L (ref 3–15)
AST SERPL-CCNC: 19 IU/L (ref 13–39)
BACTERIA URNS QL MICRO: ABNORMAL /HPF
BASOPHILS # BLD: 0 K/UL (ref 0.01–0.1)
BASOPHILS NFR BLD: 0 %
BILIRUB SERPL-MCNC: 0.5 MG/DL (ref 0.3–1.2)
BILIRUB UR QL STRIP.AUTO: NEGATIVE MG/DL
BUN SERPL-MCNC: 32 MG/DL (ref 7–25)
CALCIUM SERPL-MCNC: 9.3 MG/DL (ref 8.6–10.3)
CAOX CRY URNS QL MICRO: 2 /HPF
CHLORIDE SERPL-SCNC: 101 MEQ/L (ref 98–107)
CLARITY UR REFRACT.AUTO: ABNORMAL
CO2 SERPL-SCNC: 29 MEQ/L (ref 21–31)
COLOR UR AUTO: YELLOW
CREAT SERPL-MCNC: 1.3 MG/DL (ref 0.7–1.3)
DIFFERENTIAL METHOD BLD: ABNORMAL
EGFRCR SERPLBLD CKD-EPI 2021: 59.8 ML/MIN/1.73M*2
EOSINOPHIL # BLD: 1.08 K/UL (ref 0.04–0.54)
EOSINOPHIL NFR BLD: 4 %
ERYTHROCYTE [DISTWIDTH] IN BLOOD BY AUTOMATED COUNT: 12.7 % (ref 11.6–14.4)
GLUCOSE SERPL-MCNC: 141 MG/DL (ref 70–99)
GLUCOSE UR STRIP.AUTO-MCNC: NEGATIVE MG/DL
HCT VFR BLDCO AUTO: 39.6 % (ref 40.1–51)
HGB BLD-MCNC: 13.4 G/DL (ref 13.7–17.5)
HGB UR QL STRIP.AUTO: 2
HYALINE CASTS #/AREA URNS LPF: ABNORMAL /LPF
KETONES UR STRIP.AUTO-MCNC: NEGATIVE MG/DL
LEUKOCYTE ESTERASE UR QL STRIP.AUTO: 3
LYMPHOCYTES # BLD: 2.7 K/UL (ref 1.2–3.5)
LYMPHOCYTES NFR BLD: 10 %
MCH RBC QN AUTO: 30.4 PG (ref 28–33.2)
MCHC RBC AUTO-ENTMCNC: 33.8 G/DL (ref 32.2–36.5)
MCV RBC AUTO: 89.8 FL (ref 83–98)
MONOCYTES # BLD: 0.54 K/UL (ref 0.3–1)
MONOCYTES NFR BLD: 2 %
MUCOUS THREADS URNS QL MICRO: ABNORMAL /LPF
NEUTS BAND # BLD: 1.08 K/UL (ref 0–0.53)
NEUTS BAND # BLD: 21.57 K/UL (ref 1.7–7)
NEUTS BAND NFR BLD: 4 %
NEUTS SEG NFR BLD: 80 %
NITRITE UR QL STRIP.AUTO: NEGATIVE
PDW BLD AUTO: 11.8 FL (ref 9.4–12.4)
PH UR STRIP.AUTO: 5.5 [PH]
PLAT MORPH BLD: NORMAL
PLATELET # BLD AUTO: 170 K/UL (ref 150–350)
PLATELET # BLD EST: ABNORMAL 10*3/UL
POTASSIUM SERPL-SCNC: 3.7 MEQ/L (ref 3.5–5.1)
PROT SERPL-MCNC: 8.3 G/DL (ref 6–8.2)
PROT UR QL STRIP.AUTO: 2
RBC # BLD AUTO: 4.41 M/UL (ref 4.5–5.8)
RBC #/AREA URNS HPF: ABNORMAL /HPF
SMUDGE CELLS BLD QL SMEAR: ABNORMAL
SODIUM SERPL-SCNC: 139 MEQ/L (ref 136–145)
SP GR UR REFRACT.AUTO: 1.03
SQUAMOUS URNS QL MICRO: ABNORMAL /HPF
UROBILINOGEN UR STRIP-ACNC: 3 EU/DL
WBC # BLD AUTO: 26.96 K/UL (ref 3.8–10.5)
WBC #/AREA URNS HPF: ABNORMAL /HPF

## 2023-12-18 PROCEDURE — 85025 COMPLETE CBC W/AUTO DIFF WBC: CPT | Performed by: EMERGENCY MEDICINE

## 2023-12-18 PROCEDURE — 36415 COLL VENOUS BLD VENIPUNCTURE: CPT

## 2023-12-18 PROCEDURE — 87591 N.GONORRHOEAE DNA AMP PROB: CPT

## 2023-12-18 PROCEDURE — 76870 US EXAM SCROTUM: CPT

## 2023-12-18 PROCEDURE — 81001 URINALYSIS AUTO W/SCOPE: CPT

## 2023-12-18 PROCEDURE — 80053 COMPREHEN METABOLIC PANEL: CPT | Performed by: EMERGENCY MEDICINE

## 2023-12-18 PROCEDURE — 80053 COMPREHEN METABOLIC PANEL: CPT

## 2023-12-18 PROCEDURE — 93975 VASCULAR STUDY: CPT

## 2023-12-18 PROCEDURE — 87077 CULTURE AEROBIC IDENTIFY: CPT | Performed by: EMERGENCY MEDICINE

## 2023-12-18 PROCEDURE — 87086 URINE CULTURE/COLONY COUNT: CPT | Performed by: EMERGENCY MEDICINE

## 2023-12-18 PROCEDURE — 85025 COMPLETE CBC W/AUTO DIFF WBC: CPT

## 2023-12-18 PROCEDURE — 81001 URINALYSIS AUTO W/SCOPE: CPT | Performed by: EMERGENCY MEDICINE

## 2023-12-18 PROCEDURE — 87086 URINE CULTURE/COLONY COUNT: CPT

## 2023-12-18 PROCEDURE — 99284 EMERGENCY DEPT VISIT MOD MDM: CPT

## 2023-12-18 PROCEDURE — 63700000 HC SELF-ADMINISTRABLE DRUG

## 2023-12-18 RX ORDER — LEVOFLOXACIN 750 MG/1
750 TABLET ORAL DAILY
Qty: 10 TABLET | Refills: 0 | Status: SHIPPED | OUTPATIENT
Start: 2023-12-18 | End: 2023-12-28

## 2023-12-18 RX ADMIN — LEVOFLOXACIN 750 MG: 500 TABLET, FILM COATED ORAL at 19:29

## 2023-12-19 LAB
C TRACH RRNA SPEC QL NAA+PROBE: NEGATIVE
N GONORRHOEA RRNA SPEC QL NAA+PROBE: NEGATIVE

## 2023-12-19 NOTE — ED ATTESTATION NOTE
The patient was evaluated and managed by the physician assistant / nurse practitioner.       Nia Paez DO  12/19/23 0008

## 2023-12-19 NOTE — DISCHARGE INSTRUCTIONS
Return to the ED for any increased pain, fevers, increased vomiting or diarrhea, unable to eat or drink, unable to urinate, or any worsening of symptoms. Follow up with your healthcare provider for re-evaluation.     Please take antibiotic listed and make a follow up appointment with your PCP and urology

## 2023-12-19 NOTE — ED PROVIDER NOTES
Emergency Medicine Note  HPI   HISTORY OF PRESENT ILLNESS     68-year-old male with a past medical history of BPH, hypertension, hyperlipidemia, and hypothyroidism is presenting with 2 days of swollen and painful right testicle.  Denies new sexual partner.  No history of STI.  Notes 1 episode of a low-grade fever and chills.  Denies nausea, vomiting, diarrhea, abdominal pain, suprapubic pain, urinary symptoms such as dysuria or hematuria, or penile pain.            Patient History   PAST HISTORY     Reviewed from Nursing Triage:       Past Medical History:   Diagnosis Date   • BPH (benign prostatic hyperplasia)    • COVID-19 vaccination not done    • Fatty liver    • Graves disease    • Hypertension    • Hypothyroidism    • Lipid disorder    • Liver disease    • Male erectile dysfunction    • Type 2 diabetes mellitus (CMS/HCC)     pt denies       Past Surgical History:   Procedure Laterality Date   • CHOLECYSTECTOMY  2020   • COLONOSCOPY     • EYE SURGERY Bilateral     from Graves   • PROSTATE BIOPSY  2023   • PROSTATECTOMY  2023   • SINUS SURGERY         Family History   Problem Relation Age of Onset   • Other Biological Mother         natural causes -  at age 75   • Hypertension Biological Father    • Other Biological Father         natural causes -  at age 78   • No Known Problems Biological Sister    • Lung cancer Neg Hx        Social History     Tobacco Use   • Smoking status: Every Day     Packs/day: 0.75     Years: 40.00     Additional pack years: 0.00     Total pack years: 30.00     Types: Cigarettes     Start date:      Passive exposure: Current   • Smokeless tobacco: Never   Vaping Use   • Vaping Use: Never used   Substance Use Topics   • Alcohol use: No   • Drug use: No         Review of Systems   REVIEW OF SYSTEMS     Review of Systems      VITALS     ED Vitals    Date/Time Temp Pulse Resp BP SpO2 [facility]   23 36.9 °C (98.4 °F) 77 18 148/78 96 % [facility]   23 1902 -- 74  20 153/89 100 %    12/18/23 1712 36.8 °C (98.2 °F) 74 16 148/82 100 % [facility]        Pulse Ox %: 100 % (12/18/23 1904)  Pulse Ox Interpretation: Normal (12/18/23 1904)           Physical Exam   PHYSICAL EXAM     Physical Exam  Exam conducted with a chaperone present.   Constitutional:       General: He is not in acute distress.     Appearance: Normal appearance. He is not ill-appearing or toxic-appearing.   Cardiovascular:      Rate and Rhythm: Normal rate.      Pulses: Normal pulses.   Pulmonary:      Effort: Pulmonary effort is normal. No respiratory distress.      Breath sounds: No wheezing.   Abdominal:      General: There is no distension.      Palpations: Abdomen is soft.      Tenderness: There is no abdominal tenderness. There is no guarding.   Genitourinary:     Penis: Normal.       Testes:         Right: Tenderness, swelling and testicular hydrocele present. Mass not present.         Left: Mass, tenderness, swelling or testicular hydrocele not present.      Epididymis:      Right: Inflamed and enlarged. Tenderness present.      Left: Not inflamed or enlarged. No tenderness.   Skin:     General: Skin is warm.   Neurological:      Mental Status: He is alert.           PROCEDURES     Procedures     DATA     Results     Procedure Component Value Units Date/Time    Chlamydia/GC RNA:ThinPrep/Urine/Swab Urine, Voided [[id number]] Collected: 12/18/23 1943    Specimen: Urine, Voided Updated: 12/18/23 1946    Comprehensive metabolic panel [[id number]]  (Abnormal) Collected: 12/18/23 1720    Specimen: Blood, Venous Updated: 12/18/23 1835     Sodium 139 mEQ/L      Potassium 3.7 mEQ/L      Comment: Results obtained on plasma. Plasma Potassium values may be up to 0.4 mEQ/L less than serum values. The differences may be greater for patients with high platelet or white cell counts.        Chloride 101 mEQ/L      CO2 29 mEQ/L      BUN 32 mg/dL      Creatinine 1.3 mg/dL      Glucose 141 mg/dL      Calcium 9.3 mg/dL      AST  (SGOT) 19 IU/L      ALT (SGPT) 14 IU/L      Alkaline Phosphatase 68 IU/L      Total Protein 8.3 g/dL      Comment: Test performed on plasma which typically contains approximately 0.4 g/dL more protein than serum.        Albumin 4.0 g/dL      Bilirubin, Total 0.5 mg/dL      eGFR 59.8 mL/min/1.73m*2      Comment: Calculation based on the Chronic Kidney Disease Epidemiology Collaboration (CKD-EPI) equation refit without adjustment for race.        Anion Gap 9 mEQ/L     CBC and differential [[id number]]  (Abnormal) Collected: 12/18/23 1720    Specimen: Blood, Venous Updated: 12/18/23 1806     WBC 26.96 K/uL      RBC 4.41 M/uL      Hemoglobin 13.4 g/dL      Hematocrit 39.6 %      MCV 89.8 fL      MCH 30.4 pg      MCHC 33.8 g/dL      RDW 12.7 %      Platelets 170 K/uL      MPV 11.8 fL      Differential Type Manu     Neutrophils 80 %      Lymphocytes 10 %      Monocytes 2 %      Eosinophils 4 %      Basophils 0 %      Bands 4 %      Neutrophils, Absolute 21.57 K/uL      Lymphocytes, Absolute 2.70 K/uL      Monocytes, Absolute 0.54 K/uL      Eosinophils, Absolute 1.08 K/uL      Basophils, Absolute 0.00 K/uL      Bands, Absolute 1.08 K/uL      PLT Morphology Normal     Platelet Estimate Adequate (150,000-400,000)     Smudge Cells Occasional    UA w/ reflex culture (ED Only) [[id number]]  (Abnormal) Collected: 12/18/23 1725    Specimen: Urine, Clean Catch Updated: 12/18/23 1745    Narrative:      The following orders were created for panel order UA w/ reflex culture (ED Only).  Procedure                               Abnormality         Status                     ---------                               -----------         ------                     UA Reflex to Culture (Ma...[[id number]]  Abnormal            Final result               UA Microscopic[[id number]]               Abnormal            Final result                 Please view results for these tests on the individual orders.    UA Microscopic [[id number]]  (Abnormal)  Collected: 12/18/23 1725    Specimen: Urine, Clean Catch Updated: 12/18/23 1745     RBC, Urine 10 TO 19 /HPF      WBC, Urine Too Numerous To Count /HPF      Squamous Epithelial Rare /hpf      Hyaline Cast 3 TO 9 /lpf      Bacteria, Urine Rare /HPF      Calcium Oxalate Crystals +2 /hpf      AMORPHOUS CRYSTALS Rare /hpf      Mucus Rare /LPF     UA Reflex to Culture (Macroscopic) [[id number]]  (Abnormal) Collected: 12/18/23 1725    Specimen: Urine, Clean Catch Updated: 12/18/23 1743     Color, Urine Yellow     Clarity, Urine Cloudy     Specific Gravity, Urine 1.026     pH, Urine 5.5     Leukocyte Esterase +3     Nitrite, Urine Negative     Protein, Urine +2     Glucose, Urine Negative mg/dL      Ketones, Urine Negative mg/dL      Urobilinogen, Urine 3.0 EU/dL      Bilirubin, Urine Negative mg/dL      Blood, Urine +2     Comment: The sensitivity of the occult blood test is equivalent to approximately 4 intact RBC/HPF.             Imaging Results          ULTRASOUND SCROTUM (Final result)  Result time 12/18/23 19:04:24    Final result                 Impression:    IMPRESSION:  1. No testicular torsion.  2. Right epididymoorchitis with moderate right hydrocele.  3. Unremarkable left testicle.             Narrative:    CLINICAL HISTORY: Right scrotal swelling and pain.    COMMENT:    Ultrasound examination of the testicles was performed utilizing grayscale,  color, and pulsed Doppler sonography.  Dedicated pulsed Doppler sonography of  the testicles is performed assess for torsion.    COMPARISON: There are no prior examinations available for comparison.    The right testicle measures 2.7 x 3.9 x 4.8 cm.  The right testicle is  homogeneous in echotexture without focal mass lesion identified.  The right  epididymal head measures 1.5 x 2.6 x 2.2and is enlarged and heterogeneous.  Markedly increased vascularity of the testicle and epididymis, suggesting  epididymoorchitis. Moderate right hydrocele.    The left testicle  measures 2.3 x 2.7 x 4.9 cm.  The left testicle is homogeneous  in echotexture without focal mass lesions identified.  The left epididymal head  measures 1.3 x 1.9 x 1.7 cm. And epididymal head cyst is present measuring 1.2  cm Unremarkable vascularity of the left testicle and epididymis. No left  hydrocele or varicocele.    Spectral Doppler interrogation of the testicles demonstrates normal arterial and  venous waveforms bilaterally.  No evidence for testicular torsion.    There is no varicocele.                               ULTRASOUND DOPPLER (Final result)  Result time 12/18/23 19:04:24    Final result                 Impression:    IMPRESSION:  1. No testicular torsion.  2. Right epididymoorchitis with moderate right hydrocele.  3. Unremarkable left testicle.             Narrative:    CLINICAL HISTORY: Right scrotal swelling and pain.    COMMENT:    Ultrasound examination of the testicles was performed utilizing grayscale,  color, and pulsed Doppler sonography.  Dedicated pulsed Doppler sonography of  the testicles is performed assess for torsion.    COMPARISON: There are no prior examinations available for comparison.    The right testicle measures 2.7 x 3.9 x 4.8 cm.  The right testicle is  homogeneous in echotexture without focal mass lesion identified.  The right  epididymal head measures 1.5 x 2.6 x 2.2and is enlarged and heterogeneous.  Markedly increased vascularity of the testicle and epididymis, suggesting  epididymoorchitis. Moderate right hydrocele.    The left testicle measures 2.3 x 2.7 x 4.9 cm.  The left testicle is homogeneous  in echotexture without focal mass lesions identified.  The left epididymal head  measures 1.3 x 1.9 x 1.7 cm. And epididymal head cyst is present measuring 1.2  cm Unremarkable vascularity of the left testicle and epididymis. No left  hydrocele or varicocele.    Spectral Doppler interrogation of the testicles demonstrates normal arterial and  venous waveforms bilaterally.   No evidence for testicular torsion.    There is no varicocele.                                No orders to display       Scoring tools                                  ED Course & MDM   MDM / ED COURSE / CLINICAL IMPRESSION / DISPO     Medical Decision Making  Epididymitis, right: acute illness or injury  Lower urinary tract infection: acute illness or injury  Amount and/or Complexity of Data Reviewed  Labs: ordered. Decision-making details documented in ED Course.  Radiology: ordered and independent interpretation performed. Decision-making details documented in ED Course.     Details: Evidence of right testicular swelling and epididymitis      Risk  Prescription drug management.          ED Course as of 12/18/23 2358   Mon Dec 18, 2023   1911 WBC(!): 26.96 [DY]   1922 WBC, Urine(!): Too Numerous To Count [DY]   1922 ULTRASOUND SCROTUM  Right epididymoorchitis with moderate right hydrocele [DY]   2355 750 mg of Levaquin given in ED.  Prescription sent for epididymitis and UTI.  Advised follow-up with PCP and urology follow-up provided.  Return precautions discussed. [DY]      ED Course User Index  [DY] Jessica Hoff PA C     Clinical Impression      Epididymitis, right   Lower urinary tract infection     _________________     ED Disposition   Discharge                   Jessica Hoff PA C  12/18/23 [id number]

## 2023-12-20 ENCOUNTER — TELEPHONE (OUTPATIENT)
Dept: PRIMARY CARE | Facility: CLINIC | Age: 68
End: 2023-12-20
Payer: COMMERCIAL

## 2023-12-20 NOTE — TELEPHONE ENCOUNTER
Patient was in [place] ED on 12-18-23 with Groin Swelling.  Please call patient for ER follow up.

## 2023-12-21 LAB
BACTERIA UR CULT: ABNORMAL
BACTERIA UR CULT: ABNORMAL

## 2023-12-29 ENCOUNTER — APPOINTMENT (OUTPATIENT)
Dept: LAB | Age: 68
End: 2023-12-29
Attending: FAMILY MEDICINE
Payer: COMMERCIAL

## 2023-12-29 DIAGNOSIS — D64.9 NORMOCYTIC ANEMIA: Primary | ICD-10-CM

## 2023-12-29 LAB
ERYTHROCYTE [DISTWIDTH] IN BLOOD BY AUTOMATED COUNT: 13.1 % (ref 11.6–14.4)
FERRITIN SERPL-MCNC: 181 NG/ML (ref 24–250)
HCT VFR BLDCO AUTO: 39 % (ref 40.1–51)
HGB BLD-MCNC: 12.9 G/DL (ref 13.7–17.5)
IRON SATN MFR SERPL: 22 % (ref 15–45)
IRON SERPL-MCNC: 70 UG/DL (ref 35–150)
MCH RBC QN AUTO: 30.4 PG (ref 28–33.2)
MCHC RBC AUTO-ENTMCNC: 33.1 G/DL (ref 32.2–36.5)
MCV RBC AUTO: 92 FL (ref 83–98)
PDW BLD AUTO: 12.9 FL (ref 9.4–12.4)
PLATELET # BLD AUTO: 209 K/UL (ref 150–350)
RBC # BLD AUTO: 4.24 M/UL (ref 4.5–5.8)
TIBC SERPL-MCNC: 314 UG/DL (ref 270–460)
UIBC SERPL-MCNC: 244 UG/DL (ref 180–360)
WBC # BLD AUTO: 7.52 K/UL (ref 3.8–10.5)

## 2023-12-29 PROCEDURE — 36415 COLL VENOUS BLD VENIPUNCTURE: CPT

## 2023-12-29 PROCEDURE — 83540 ASSAY OF IRON: CPT

## 2023-12-29 PROCEDURE — 85027 COMPLETE CBC AUTOMATED: CPT

## 2023-12-29 PROCEDURE — 82728 ASSAY OF FERRITIN: CPT

## 2023-12-29 PROCEDURE — 83550 IRON BINDING TEST: CPT

## 2024-01-09 NOTE — PROGRESS NOTES
Cardiology  Office Consult Note         Reason for visit:   Chief Complaint   Patient presents with   • Cardiac Evaluation       HPI     Rhys Campos is a 68 y.o. male who presents to the office for cardiovascular evaluation.    He has a history of CACS 182 (Left Main 13, LAD 1, , RCA 0) on 9/7/19, hypertension, dyslipidemia and diabetes.    He was previously seen in the office by Dr. Fox back in 2019.    He was referred by his PCP due to abnormal EKG / right bundle branch block on EKG dated 5/23/23.    He denies any chest pain, shortness of breath, edema, palpitations, near syncope or syncope.    His home BPs usually run 125-135/78-82.    He goes to the gym 3 times per week and lifts weights for 1 hour minimum.    FLP 9/15/23:  , , HDL 35, LDL 95      Pertinent lab results reviewed..    Past Medical History:   Diagnosis Date   • BPH (benign prostatic hyperplasia)    • Coronary artery disease     CACS 182:  Left Main 13, LAD 1, , RCA 0 on 9/7/19,   • COVID-19 vaccination not done    • Fatty liver    • Graves disease    • Hypertension    • Hypothyroidism    • Lipid disorder    • Liver disease    • Male erectile dysfunction    • Type 2 diabetes mellitus (CMS/HCC)        Past Surgical History:   Procedure Laterality Date   • CHOLECYSTECTOMY  05/2020   • COLONOSCOPY     • EYE SURGERY Bilateral     from Graves   • PROSTATE BIOPSY  04/11/2023   • SINUS SURGERY          Social History     Tobacco Use   Smoking Status Every Day   • Packs/day: 0.75   • Years: 40.00   • Additional pack years: 0.00   • Total pack years: 30.00   • Types: Cigarettes   • Start date: 1980   • Passive exposure: Current   Smokeless Tobacco Never     Social History     Tobacco Use   • Smoking status: Every Day     Packs/day: 0.75     Years: 40.00     Additional pack years: 0.00     Total pack years: 30.00     Types: Cigarettes     Start date: 1980     Passive exposure: Current   • Smokeless tobacco: Never    Vaping Use   • Vaping Use: Never used   Substance Use Topics   • Alcohol use: No   • Drug use: No       Family History   Problem Relation Age of Onset   • Other Biological Mother         natural causes -  at age 75   • Hypertension Biological Father    • Other Biological Father         natural causes -  at age 78   • No Known Problems Biological Sister    • Lung cancer Neg Hx        Allergies:  Nickel    Current Outpatient Medications   Medication Sig Dispense Refill   • amLODIPine (NORVASC) 10 mg tablet Take 1 tablet (10 mg total) by mouth daily. 90 tablet 1   • atorvastatin (LIPITOR) 20 mg tablet Take 1 tablet (20 mg total) by mouth nightly. 90 tablet 3   • levothyroxine (SYNTHROID) 137 mcg tablet Take 1 tablet (137 mcg total) by mouth every morning. 90 tablet 3   • lisinopriL-hydrochlorothiazide (PRINZIDE,ZESTORETIC) 20-12.5 mg per tablet take 1 tablet by mouth once daily 90 tablet 1   • metFORMIN (GLUCOPHAGE) 500 mg tablet Take 1 tablet (500 mg total) by mouth daily.       No current facility-administered medications for this visit.       Review of Systems   Constitutional: Negative for malaise/fatigue.   Cardiovascular: Negative for chest pain, dyspnea on exertion, irregular heartbeat, leg swelling, near-syncope, orthopnea, palpitations and syncope.   Hematologic/Lymphatic: Does not bruise/bleed easily.   Neurological: Negative for dizziness and light-headedness.   All other systems reviewed and are negative.      Objective     Vitals:    24 1436   BP: (!) 153/89   Pulse: 99   SpO2: 96%       Wt Readings from Last 1 Encounters:   24 77.9 kg (171 lb 12.8 oz)       Body mass index is 25.37 kg/m².    Physical Exam  Constitutional:       General: He is not in acute distress.     Appearance: Normal appearance.   HENT:      Head: Normocephalic.   Eyes:      Extraocular Movements: Extraocular movements intact.   Neck:      Vascular: No JVD.   Cardiovascular:      Rate and Rhythm: Normal rate and  regular rhythm.      Heart sounds: Normal heart sounds. No murmur heard.  Pulmonary:      Breath sounds: Normal breath sounds. No wheezing, rhonchi or rales.   Abdominal:      Palpations: Abdomen is soft.   Musculoskeletal:         General: No swelling.   Skin:     General: Skin is warm and dry.   Neurological:      Mental Status: He is alert.   Psychiatric:         Mood and Affect: Mood normal.         ECG   Sinus rhythm with sinus arrhythmia with occasional Premature ventricular complexes   Left axis deviation   Right bundle branch block   Minimal voltage criteria for LVH    Hematology  Lab Results   Component Value Date    WBC 7.52 12/29/2023    HGB 12.9 (L) 12/29/2023    HCT 39.0 (L) 12/29/2023     12/29/2023    INR 1.0 05/24/2023       Chemistries  Lab Results   Component Value Date     12/18/2023    K 3.7 12/18/2023     12/18/2023    CREATININE 1.3 12/18/2023    BUN 32 (H) 12/18/2023    CO2 29 12/18/2023    GLUCOSE 141 (H) 12/18/2023    CALCIUM 9.3 12/18/2023    MG 2.1 05/22/2020    ALT 14 12/18/2023    AST 19 12/18/2023       Cholesterol  Lab Results   Component Value Date    CHOL 152 09/15/2023    TRIG 109 09/15/2023    HDL 35 (L) 09/15/2023    LDLCALC 95 09/15/2023    NONHDLCALC 117 09/15/2023       Endocrine  Lab Results   Component Value Date    TSH 1.24 09/15/2023    FREET4 1.04 08/10/2018    HGBA1C 6.8 (H) 09/15/2023       Assessment/Plan     RBBB  He denies any symptoms suggestive of more advanced conduction system disease.  We will check a resting echocardiogram.  If this is unremarkable, I have not recommended any further cardiac testing in this regard.    Coronary artery calcification seen on CT scan  He denies any anginal symptoms.  There are no ischemic changes on his EKG.  Given this finding, I recommended that the patient resume his rosuvastatin to decrease his future risk of cardiovascular events and he agrees.    Pure hypercholesterolemia  With his known coronary  calcifications, I would recommend target LDL less than 70 with a non-HDL less than 100.    Essential hypertension  Although his blood pressure is elevated in the office, it has been reasonably wellcontrolled at home.  I have made no changes to this aspect of his medical regimen. He should continue to keep a low sodium diet to help maintain adequate blood pressure control.  He will continue to monitor his blood pressure at home and call us if it is routinely elevated.        Orders Placed This Encounter   Procedures   • ECG 12 lead     Scheduling Instructions:      PLEASE USE THIS ORDER FOR ECG'S PERFORMED IN PHYSICIAN OFFICES     Order Specific Question:   Release to patient     Answer:   Immediate     Order Specific Question:   Release to patient     Answer:   Immediate [1]   • Transthoracic echo (TTE) complete     Standing Status:   Future     Standing Expiration Date:   1/16/2026     Order Specific Question:   Is contrast and/or agitated saline (bubbles) indicated for the study? (Contrast = Definity OR Lumason)     Answer:   No     Order Specific Question:   Release to patient     Answer:   Immediate     Order Specific Question:   Release to patient     Answer:   Immediate [1]       Follow Up Plans:  Return if symptoms worsen or fail to improve.          I, Alisa Vaz, am scribing for, and in the presence of, Inder Bell Jr., MD.    IInder Jr., MD, personally performed the services described in this documentation as scribed by Alisa Vaz in my presence, and it is both accurate and complete.     Inder Bell Jr., MD  1/16/2024

## 2024-01-16 ENCOUNTER — OFFICE VISIT (OUTPATIENT)
Dept: CARDIOLOGY | Facility: CLINIC | Age: 69
End: 2024-01-16
Payer: COMMERCIAL

## 2024-01-16 VITALS
WEIGHT: 171.8 LBS | DIASTOLIC BLOOD PRESSURE: 89 MMHG | BODY MASS INDEX: 25.45 KG/M2 | OXYGEN SATURATION: 96 % | HEART RATE: 99 BPM | HEIGHT: 69 IN | SYSTOLIC BLOOD PRESSURE: 153 MMHG

## 2024-01-16 DIAGNOSIS — I10 ESSENTIAL HYPERTENSION: Primary | ICD-10-CM

## 2024-01-16 DIAGNOSIS — I25.10 CORONARY ARTERY CALCIFICATION SEEN ON CT SCAN: ICD-10-CM

## 2024-01-16 DIAGNOSIS — E78.00 PURE HYPERCHOLESTEROLEMIA: ICD-10-CM

## 2024-01-16 DIAGNOSIS — I45.10 RBBB: ICD-10-CM

## 2024-01-16 LAB
ATRIAL RATE: 79
P AXIS: 51
PR INTERVAL: 158
QRS DURATION: 158
QT INTERVAL: 432
QTC CALCULATION(BAZETT): 495
R AXIS: -36
T WAVE AXIS: 24
VENTRICULAR RATE: 79

## 2024-01-16 PROCEDURE — 3008F BODY MASS INDEX DOCD: CPT | Performed by: INTERNAL MEDICINE

## 2024-01-16 PROCEDURE — 99204 OFFICE O/P NEW MOD 45 MIN: CPT | Performed by: INTERNAL MEDICINE

## 2024-01-16 PROCEDURE — 93000 ELECTROCARDIOGRAM COMPLETE: CPT | Performed by: INTERNAL MEDICINE

## 2024-01-16 PROCEDURE — 3079F DIAST BP 80-89 MM HG: CPT | Performed by: INTERNAL MEDICINE

## 2024-01-16 PROCEDURE — 3077F SYST BP >= 140 MM HG: CPT | Performed by: INTERNAL MEDICINE

## 2024-01-16 RX ORDER — METFORMIN HYDROCHLORIDE 500 MG/1
500 TABLET ORAL DAILY
COMMUNITY
Start: 2024-01-16 | End: 2024-04-15 | Stop reason: SDUPTHER

## 2024-01-16 ASSESSMENT — ENCOUNTER SYMPTOMS
BRUISES/BLEEDS EASILY: 0
DYSPNEA ON EXERTION: 0
SYNCOPE: 0
PALPITATIONS: 0
LIGHT-HEADEDNESS: 0
IRREGULAR HEARTBEAT: 0
NEAR-SYNCOPE: 0
DIZZINESS: 0
ORTHOPNEA: 0

## 2024-01-16 NOTE — ASSESSMENT & PLAN NOTE
He denies any anginal symptoms.  There are no ischemic changes on his EKG.  Given this finding, I recommended that the patient resume his rosuvastatin to decrease his future risk of cardiovascular events and he agrees.

## 2024-01-16 NOTE — ASSESSMENT & PLAN NOTE
With his known coronary calcifications, I would recommend target LDL less than 70 with a non-HDL less than 100.

## 2024-01-16 NOTE — ASSESSMENT & PLAN NOTE
He denies any symptoms suggestive of more advanced conduction system disease.  We will check a resting echocardiogram.  If this is unremarkable, I have not recommended any further cardiac testing in this regard.

## 2024-01-16 NOTE — LETTER
January 16, 2024     Clint Coker Jr., DO  [address]  [address] [address]    Patient: Rhys Campos  YOB: 1955  Date of Visit: 1/16/2024      Dear Dr. Coker:    Thank you for referring Rhys Campos to me for evaluation. Below are my notes for this consultation.    If you have questions, please do not hesitate to call me. I look forward to following your patient along with you.         Sincerely,        Inder Bell Jr., MD        CC: No Recipients    Inder Bell Jr., MD  1/16/2024  2:39 PM  Signed       Cardiology  Office Consult Note         Reason for visit:   Chief Complaint   Patient presents with   • Cardiac Evaluation       HPI     Rhys Campos is a 68 y.o. male who presents to the office for cardiovascular evaluation.    He has a history of CACS 182 (Left Main 13, LAD 1, , RCA 0) on 9/7/19, hypertension, dyslipidemia and diabetes.    He was previously seen in the office by Dr. Fox back in 2019.    He was referred by his PCP due to abnormal EKG / right bundle branch block on EKG dated 5/23/23.    He denies any chest pain, shortness of breath, edema, palpitations, near syncope or syncope.    His home BPs usually run 125-135/78-82.    He goes to the gym 3 times per week and lifts weights for 1 hour minimum.    FLP 9/15/23:  , , HDL 35, LDL 95      Pertinent lab results reviewed..    Past Medical History:   Diagnosis Date   • BPH (benign prostatic hyperplasia)    • Coronary artery disease     CACS 182:  Left Main 13, LAD 1, , RCA 0 on 9/7/19,   • COVID-19 vaccination not done    • Fatty liver    • Graves disease    • Hypertension    • Hypothyroidism    • Lipid disorder    • Liver disease    • Male erectile dysfunction    • Type 2 diabetes mellitus (CMS/HCC)        Past Surgical History:   Procedure Laterality Date   • CHOLECYSTECTOMY  05/2020   • COLONOSCOPY     • EYE SURGERY Bilateral     from Graves   • PROSTATE  BIOPSY  2023   • SINUS SURGERY          Social History     Tobacco Use   Smoking Status Every Day   • Packs/day: 0.75   • Years: 40.00   • Additional pack years: 0.00   • Total pack years: 30.00   • Types: Cigarettes   • Start date:    • Passive exposure: Current   Smokeless Tobacco Never     Social History     Tobacco Use   • Smoking status: Every Day     Packs/day: 0.75     Years: 40.00     Additional pack years: 0.00     Total pack years: 30.00     Types: Cigarettes     Start date:      Passive exposure: Current   • Smokeless tobacco: Never   Vaping Use   • Vaping Use: Never used   Substance Use Topics   • Alcohol use: No   • Drug use: No       Family History   Problem Relation Age of Onset   • Other Biological Mother         natural causes -  at age 75   • Hypertension Biological Father    • Other Biological Father         natural causes -  at age 78   • No Known Problems Biological Sister    • Lung cancer Neg Hx        Allergies:  Nickel    Current Outpatient Medications   Medication Sig Dispense Refill   • amLODIPine (NORVASC) 10 mg tablet Take 1 tablet (10 mg total) by mouth daily. 90 tablet 1   • atorvastatin (LIPITOR) 20 mg tablet Take 1 tablet (20 mg total) by mouth nightly. 90 tablet 3   • levothyroxine (SYNTHROID) 137 mcg tablet Take 1 tablet (137 mcg total) by mouth every morning. 90 tablet 3   • lisinopriL-hydrochlorothiazide (PRINZIDE,ZESTORETIC) 20-12.5 mg per tablet take 1 tablet by mouth once daily 90 tablet 1   • metFORMIN (GLUCOPHAGE) 500 mg tablet Take 1 tablet (500 mg total) by mouth daily.       No current facility-administered medications for this visit.       Review of Systems   Constitutional: Negative for malaise/fatigue.   Cardiovascular: Negative for chest pain, dyspnea on exertion, irregular heartbeat, leg swelling, near-syncope, orthopnea, palpitations and syncope.   Hematologic/Lymphatic: Does not bruise/bleed easily.   Neurological: Negative for dizziness and  light-headedness.   All other systems reviewed and are negative.      Objective     Vitals:    01/16/24 1436   BP: (!) 153/89   Pulse: 99   SpO2: 96%       Wt Readings from Last 1 Encounters:   01/16/24 77.9 kg (171 lb 12.8 oz)       Body mass index is 25.37 kg/m².    Physical Exam  Constitutional:       General: He is not in acute distress.     Appearance: Normal appearance.   HENT:      Head: Normocephalic.   Eyes:      Extraocular Movements: Extraocular movements intact.   Neck:      Vascular: No JVD.   Cardiovascular:      Rate and Rhythm: Normal rate and regular rhythm.      Heart sounds: Normal heart sounds. No murmur heard.  Pulmonary:      Breath sounds: Normal breath sounds. No wheezing, rhonchi or rales.   Abdominal:      Palpations: Abdomen is soft.   Musculoskeletal:         General: No swelling.   Skin:     General: Skin is warm and dry.   Neurological:      Mental Status: He is alert.   Psychiatric:         Mood and Affect: Mood normal.         ECG   Sinus rhythm with sinus arrhythmia with occasional Premature ventricular complexes   Left axis deviation   Right bundle branch block   Minimal voltage criteria for LVH    Hematology  Lab Results   Component Value Date    WBC 7.52 12/29/2023    HGB 12.9 (L) 12/29/2023    HCT 39.0 (L) 12/29/2023     12/29/2023    INR 1.0 05/24/2023       Chemistries  Lab Results   Component Value Date     12/18/2023    K 3.7 12/18/2023     12/18/2023    CREATININE 1.3 12/18/2023    BUN 32 (H) 12/18/2023    CO2 29 12/18/2023    GLUCOSE 141 (H) 12/18/2023    CALCIUM 9.3 12/18/2023    MG 2.1 05/22/2020    ALT 14 12/18/2023    AST 19 12/18/2023       Cholesterol  Lab Results   Component Value Date    CHOL 152 09/15/2023    TRIG 109 09/15/2023    HDL 35 (L) 09/15/2023    LDLCALC 95 09/15/2023    NONHDLCALC 117 09/15/2023       Endocrine  Lab Results   Component Value Date    TSH 1.24 09/15/2023    FREET4 1.04 08/10/2018    HGBA1C 6.8 (H) 09/15/2023        Assessment/Plan     RBBB  He denies any symptoms suggestive of more advanced conduction system disease.  We will check a resting echocardiogram.  If this is unremarkable, I have not recommended any further cardiac testing in this regard.    Coronary artery calcification seen on CT scan  He denies any anginal symptoms.  There are no ischemic changes on his EKG.  Given this finding, I recommended that the patient resume his rosuvastatin to decrease his future risk of cardiovascular events and he agrees.    Pure hypercholesterolemia  With his known coronary calcifications, I would recommend target LDL less than 70 with a non-HDL less than 100.    Essential hypertension  Although his blood pressure is elevated in the office, it has been reasonably wellcontrolled at home.  I have made no changes to this aspect of his medical regimen. He should continue to keep a low sodium diet to help maintain adequate blood pressure control.  He will continue to monitor his blood pressure at home and call us if it is routinely elevated.        Orders Placed This Encounter   Procedures   • ECG 12 lead     Scheduling Instructions:      PLEASE USE THIS ORDER FOR ECG'S PERFORMED IN PHYSICIAN OFFICES     Order Specific Question:   Release to patient     Answer:   Immediate     Order Specific Question:   Release to patient     Answer:   Immediate [1]   • Transthoracic echo (TTE) complete     Standing Status:   Future     Standing Expiration Date:   1/16/2026     Order Specific Question:   Is contrast and/or agitated saline (bubbles) indicated for the study? (Contrast = Definity OR Lumason)     Answer:   No     Order Specific Question:   Release to patient     Answer:   Immediate     Order Specific Question:   Release to patient     Answer:   Immediate [1]       Follow Up Plans:  Return if symptoms worsen or fail to improve.         Alisa SOTO, am scribing for, and in the presence of, Inder Bell Jr., MD.    Inder SOTO  MD Pia, personally performed the services described in this documentation as scribed by Alisa Vaz in my presence, and it is both accurate and complete.     Inder Bell Jr., MD  1/16/2024

## 2024-01-16 NOTE — ASSESSMENT & PLAN NOTE
Although his blood pressure is elevated in the office, it has been reasonably wellcontrolled at home.  I have made no changes to this aspect of his medical regimen. He should continue to keep a low sodium diet to help maintain adequate blood pressure control.  He will continue to monitor his blood pressure at home and call us if it is routinely elevated.

## 2024-02-08 ENCOUNTER — HOSPITAL ENCOUNTER (OUTPATIENT)
Dept: CARDIOLOGY | Facility: CLINIC | Age: 69
Discharge: HOME | End: 2024-02-08
Attending: INTERNAL MEDICINE
Payer: COMMERCIAL

## 2024-02-08 VITALS
SYSTOLIC BLOOD PRESSURE: 148 MMHG | WEIGHT: 173.5 LBS | BODY MASS INDEX: 25.7 KG/M2 | HEIGHT: 69 IN | DIASTOLIC BLOOD PRESSURE: 78 MMHG

## 2024-02-08 DIAGNOSIS — I45.10 RBBB: ICD-10-CM

## 2024-02-08 PROCEDURE — 93306 TTE W/DOPPLER COMPLETE: CPT | Performed by: INTERNAL MEDICINE

## 2024-02-10 LAB
AORTIC SINUS VALSALVA: 3.8 CM
ASCENDING AORTA: 4.4 CM
AV PEAK GRADIENT: 13 MMHG
AV PEAK VELOCITY-S: 1.78 M/S
AV VALVE AREA: 1.72 CM2
BSA FOR ECHO PROCEDURE: 1.96 M2
E WAVE DECELERATION TIME: 323 MS
E/A RATIO: 0.7
E/E' RATIO: 11.1
E/LAT E' RATIO: 7
EDV (BP): 76.8 CM3
EF (A4C): 65.4 %
EF A2C: 58.2 %
EJECTION FRACTION: 63 %
EST RIGHT VENT SYSTOLIC PRESSURE BY TRICUSPID REGURGITATION JET: 29 MMHG
ESV (BP): 28.4 CM3
FRACTIONAL SHORTENING: 36.78 %
HEART RATE: 65 BPM
INTERVENTRICULAR SEPTUM: 1.11 CM
LA ESV (BP): 64.2 CM3
LA ESV INDEX (A2C): 36.89 CM3/M2
LA ESV INDEX (BP): 32.76 CM3/M2
LA/AORTA RATIO: 0.9
LAAS-AP2: 23 CM2
LAAS-AP4: 20.2 CM2
LAD 2D: 3.4 CM
LALD A4C: 5.67 CM
LALD A4C: 5.86 CM
LAV-S: 72.3 CM3
LEFT ATRIUM VOLUME INDEX: 28.52 CM3/M2
LEFT ATRIUM VOLUME: 55.9 CM3
LEFT INTERNAL DIMENSION IN SYSTOLE: 2.87 CM (ref 2.76–4.17)
LEFT VENTRICLE DIASTOLIC VOLUME INDEX: 33.98 CM3/M2
LEFT VENTRICLE DIASTOLIC VOLUME: 66.6 CM3
LEFT VENTRICLE SYSTOLIC VOLUME INDEX: 11.73 CM3/M2
LEFT VENTRICLE SYSTOLIC VOLUME: 23 CM3
LEFT VENTRICULAR INTERNAL DIMENSION IN DIASTOLE: 4.54 CM (ref 4.68–6.49)
LEFT VENTRICULAR POSTERIOR WALL IN END DIASTOLE: 1.12 CM (ref 0.61–1.14)
LV DIASTOLIC VOLUME: 87 CM3
LV ESV (APICAL 2 CHAMBER): 36.3 CM3
LVAD-AP2: 28.6 CM2
LVAD-AP4: 24.9 CM2
LVAS-AP2: 15.8 CM2
LVAS-AP4: 13.5 CM2
LVEDVI(A2C): 44.39 CM3/M2
LVEDVI(BP): 39.18 CM3/M2
LVESVI(A2C): 18.52 CM3/M2
LVESVI(BP): 14.49 CM3/M2
LVLD-AP2: 7.93 CM
LVLD-AP4: 7.68 CM
LVLS-AP2: 6.44 CM
LVLS-AP4: 6.58 CM
LVOT 2D: 2 CM
LVOT A: 3.14 CM2
LVOT PEAK VELOCITY: 1.24 M/S
LVOT PG: 6 MMHG
MLH CV ECHO AVA INDEX VELOCITY RATIO: 0.9
MV E'TISSUE VEL-LAT: 0.11 M/S
MV E'TISSUE VEL-MED: 0.07 M/S
MV PEAK A VEL: 1.07 M/S
MV PEAK E VEL: 0.76 M/S
MV STENOSIS PRESSURE HALF TIME: 94 MS
MV VALVE AREA P 1/2 METHOD: 2.34 CM2
POSTERIOR WALL: 1.12 CM
PV PEAK GRADIENT: 5 MMHG
PV PV: 1.07 M/S
RAP: 5 MMHG
RVOT VMAX: 0.66 M/S
SEPTAL TISSUE DOPPLER FREE WALL LATE DIA VELOCITY (APICAL 4 CHAMBER VIEW): 0.11 M/S
TR MAX PG: 23.81 MMHG
TRICUSPID VALVE PEAK REGURGITATION VELOCITY: 2.44 M/S
Z-SCORE OF LEFT VENTRICULAR DIMENSION IN END DIASTOLE: -1.94
Z-SCORE OF LEFT VENTRICULAR DIMENSION IN END SYSTOLE: -1.32
Z-SCORE OF LEFT VENTRICULAR POSTERIOR WALL IN END DIASTOLE: 1.55

## 2024-02-12 PROBLEM — I77.810 ASCENDING AORTA DILATATION (CMS/HCC): Status: ACTIVE | Noted: 2024-02-12

## 2024-03-09 DIAGNOSIS — I10 ESSENTIAL HYPERTENSION: ICD-10-CM

## 2024-03-11 RX ORDER — AMLODIPINE BESYLATE 10 MG/1
10 TABLET ORAL DAILY
Qty: 90 TABLET | Refills: 1 | Status: SHIPPED | OUTPATIENT
Start: 2024-03-11 | End: 2024-08-30

## 2024-03-11 RX ORDER — LISINOPRIL AND HYDROCHLOROTHIAZIDE 12.5; 2 MG/1; MG/1
1 TABLET ORAL DAILY
Qty: 90 TABLET | Refills: 1 | Status: SHIPPED | OUTPATIENT
Start: 2024-03-11 | End: 2024-09-30 | Stop reason: SDUPTHER

## 2024-03-11 NOTE — TELEPHONE ENCOUNTER
Medicine Refill Request    Last Office Visit: 9/21/2023   Last Consult Visit: Visit date not found  Last Telemedicine Visit: Visit date not found    Next Appointment: 3/26/2024      Current Outpatient Medications:   •  amLODIPine (NORVASC) 10 mg tablet, Take 1 tablet (10 mg total) by mouth daily., Disp: 90 tablet, Rfl: 1  •  atorvastatin (LIPITOR) 20 mg tablet, Take 1 tablet (20 mg total) by mouth nightly., Disp: 90 tablet, Rfl: 3  •  levothyroxine (SYNTHROID) 137 mcg tablet, Take 1 tablet (137 mcg total) by mouth every morning., Disp: 90 tablet, Rfl: 3  •  lisinopriL-hydrochlorothiazide (PRINZIDE,ZESTORETIC) 20-12.5 mg per tablet, take 1 tablet by mouth once daily, Disp: 90 tablet, Rfl: 1  •  metFORMIN (GLUCOPHAGE) 500 mg tablet, Take 1 tablet (500 mg total) by mouth daily., Disp: , Rfl:       BP Readings from Last 3 Encounters:   02/08/24 (!) 148/78   01/16/24 (!) 153/89   12/18/23 (!) 148/78       Recent Lab results:  Lab Results   Component Value Date    CHOL 152 09/15/2023   ,   Lab Results   Component Value Date    HDL 35 (L) 09/15/2023   ,   Lab Results   Component Value Date    LDLCALC 95 09/15/2023   ,   Lab Results   Component Value Date    TRIG 109 09/15/2023        Lab Results   Component Value Date    GLUCOSE 141 (H) 12/18/2023   ,   Lab Results   Component Value Date    HGBA1C 6.8 (H) 09/15/2023         Lab Results   Component Value Date    CREATININE 1.3 12/18/2023       Lab Results   Component Value Date    TSH 1.24 09/15/2023           Lab Results   Component Value Date    HGBA1C 6.8 (H) 09/15/2023

## 2024-03-25 PROBLEM — Z01.818 PREOP EXAM FOR INTERNAL MEDICINE: Status: RESOLVED | Noted: 2023-05-24 | Resolved: 2024-03-25

## 2024-03-26 ENCOUNTER — OFFICE VISIT (OUTPATIENT)
Dept: PRIMARY CARE | Facility: CLINIC | Age: 69
End: 2024-03-26
Payer: COMMERCIAL

## 2024-03-26 VITALS
HEART RATE: 70 BPM | TEMPERATURE: 98.8 F | RESPIRATION RATE: 17 BRPM | SYSTOLIC BLOOD PRESSURE: 134 MMHG | DIASTOLIC BLOOD PRESSURE: 72 MMHG | HEIGHT: 69 IN | BODY MASS INDEX: 25.8 KG/M2 | OXYGEN SATURATION: 98 % | WEIGHT: 174.2 LBS

## 2024-03-26 DIAGNOSIS — R80.9 TYPE 2 DIABETES MELLITUS WITH MICROALBUMINURIA, WITHOUT LONG-TERM CURRENT USE OF INSULIN (CMS/HCC): Primary | ICD-10-CM

## 2024-03-26 DIAGNOSIS — I77.810 ASCENDING AORTA DILATATION (CMS/HCC): ICD-10-CM

## 2024-03-26 DIAGNOSIS — R91.8 MULTIPLE LUNG NODULES ON CT: ICD-10-CM

## 2024-03-26 DIAGNOSIS — Z28.21 COVID-19 VACCINATION DECLINED: ICD-10-CM

## 2024-03-26 DIAGNOSIS — I10 ESSENTIAL HYPERTENSION: ICD-10-CM

## 2024-03-26 DIAGNOSIS — L98.9 SKIN LESION OF FACE: ICD-10-CM

## 2024-03-26 DIAGNOSIS — K63.5 POLYP OF COLON, UNSPECIFIED PART OF COLON, UNSPECIFIED TYPE: ICD-10-CM

## 2024-03-26 DIAGNOSIS — E03.8 OTHER SPECIFIED HYPOTHYROIDISM: ICD-10-CM

## 2024-03-26 DIAGNOSIS — I25.10 CORONARY ARTERY CALCIFICATION SEEN ON CT SCAN: ICD-10-CM

## 2024-03-26 DIAGNOSIS — D64.9 NORMOCYTIC ANEMIA: ICD-10-CM

## 2024-03-26 DIAGNOSIS — E11.29 TYPE 2 DIABETES MELLITUS WITH MICROALBUMINURIA, WITHOUT LONG-TERM CURRENT USE OF INSULIN (CMS/HCC): Primary | ICD-10-CM

## 2024-03-26 DIAGNOSIS — Z90.79 S/P PROSTATECTOMY: ICD-10-CM

## 2024-03-26 DIAGNOSIS — I77.811 AORTIC ECTASIA, ABDOMINAL (CMS/HCC): ICD-10-CM

## 2024-03-26 PROCEDURE — 99214 OFFICE O/P EST MOD 30 MIN: CPT | Performed by: FAMILY MEDICINE

## 2024-03-26 PROCEDURE — 3008F BODY MASS INDEX DOCD: CPT | Performed by: FAMILY MEDICINE

## 2024-03-26 PROCEDURE — 3075F SYST BP GE 130 - 139MM HG: CPT | Performed by: FAMILY MEDICINE

## 2024-03-26 PROCEDURE — 3078F DIAST BP <80 MM HG: CPT | Performed by: FAMILY MEDICINE

## 2024-03-26 RX ORDER — ATORVASTATIN CALCIUM 20 MG/1
10 TABLET, FILM COATED ORAL NIGHTLY
COMMUNITY
Start: 2024-03-26 | End: 2024-05-13

## 2024-03-26 ASSESSMENT — ENCOUNTER SYMPTOMS
ABDOMINAL PAIN: 0
FLANK PAIN: 0
CHEST TIGHTNESS: 0
SHORTNESS OF BREATH: 0
FREQUENCY: 0
DIAPHORESIS: 0
VOMITING: 0
BACK PAIN: 0
DIARRHEA: 0
HEMATURIA: 0
NAUSEA: 0
FEVER: 0
APPETITE CHANGE: 0
ABDOMINAL DISTENTION: 0
CHILLS: 0
DYSURIA: 0

## 2024-03-26 ASSESSMENT — PATIENT HEALTH QUESTIONNAIRE - PHQ9: SUM OF ALL RESPONSES TO PHQ9 QUESTIONS 1 & 2: 0

## 2024-03-26 NOTE — ASSESSMENT & PLAN NOTE
-He did see his eye doctor in January, continues on metformin daily, encouraged him to cut back on sweets in general, and we will check A1c.  Microalbumin checked today.

## 2024-03-26 NOTE — ASSESSMENT & PLAN NOTE
-Dr. Bell had recommended a goal LDL less than 70.  He has only restarted 10 mg of the atorvastatin daily so we will see what his lipid panel looks like with this and adjust accordingly.  - patient is indicating that he does not want to go higher than 10 mg, but we will see how his LDL looks and have a discussion at that time

## 2024-03-26 NOTE — ASSESSMENT & PLAN NOTE
-His TSH was under good control but we will continue to follow.  His thyroid exam remains normal and he remains clinically euthyroid

## 2024-03-26 NOTE — ASSESSMENT & PLAN NOTE
-They are in the process of scheduling the colonoscopy now they have received cardiac clearance for this summer in the next 3 months

## 2024-03-26 NOTE — PROGRESS NOTES
"    Subjective      Patient ID: Rhys Campos is a 68 y.o. male.  1955      Here for a follow-up of diabetes    Diabetes-due for microalbumin now  -Diabetic eye exam: 1/8/24- f/u 1 yr.   Last A1c 6.8% September 2023  -down a few lbs since last visit- is eating ice cream 3d/wk.    Had colonoscopy 10/2023 scheduled, but this got postponed until May or June.     Hypothyroidism-TSH was 1.24 in September    Hypertension/CAD-saw Dr. Bell on 1/16 and was recommended to restart Lipitor with a goal LDL less than 70.  Echo showed ascending aortic dilatation of 4.4 cm with no change compared to 2019. He is only taking 10 mg (half tablet). No chest pain or palpitations. At gym 3d/wk.     Anemia-his hemoglobin was 12.9 in December with normal iron studies. No bloody stools.     Lung nodules-he saw the pulmonologist in September and recommended low-dose CT lung screen in July with order provided. No wheezing, coughing, or sob.         The following have been reviewed and updated as appropriate in this visit:   Tobacco  Meds  Surg Hx  Fam Hx       Review of Systems   Constitutional:  Negative for appetite change, chills, diaphoresis and fever.   Respiratory:  Negative for chest tightness and shortness of breath.    Cardiovascular:  Negative for chest pain and leg swelling.   Gastrointestinal:  Negative for abdominal distention, abdominal pain, diarrhea, nausea and vomiting.   Genitourinary:  Negative for dysuria, flank pain, frequency, hematuria and urgency.        Strong stream and no nocturia at all   Musculoskeletal:  Negative for back pain.       Objective     Vitals:    03/26/24 1437 03/26/24 1517   BP: 126/76 134/72   BP Location: Right upper arm Left upper arm   Patient Position: Sitting Sitting   Pulse: 70    Resp: 17    Temp: 37.1 °C (98.8 °F)    TempSrc: Temporal    SpO2: 98%    Weight: 79 kg (174 lb 3.2 oz)    Height: 1.753 m (5' 9\")      Body mass index is 25.72 kg/m².    Physical Exam  Constitutional:  "      General: He is not in acute distress.     Appearance: Normal appearance. He is normal weight. He is not ill-appearing, toxic-appearing or diaphoretic.   HENT:      Head: Normocephalic and atraumatic.   Neck:      Thyroid: No thyroid mass, thyromegaly or thyroid tenderness.   Cardiovascular:      Rate and Rhythm: Normal rate and regular rhythm.      Heart sounds: Normal heart sounds. No murmur heard.     No friction rub. No gallop.      Comments: Regular rate and rhythm with an occasional single asx ectopic beat noted every 20 beats or so  Pulmonary:      Effort: Pulmonary effort is normal. No respiratory distress.      Breath sounds: Normal breath sounds. No stridor. No wheezing, rhonchi or rales.   Abdominal:      General: Bowel sounds are normal. There is no distension.      Palpations: Abdomen is soft. There is no mass.      Tenderness: There is no abdominal tenderness. There is no guarding or rebound.      Hernia: No hernia is present.   Musculoskeletal:      Right lower leg: No edema.      Left lower leg: No edema.   Lymphadenopathy:      Cervical: No cervical adenopathy.   Skin:     Comments: Dark brown irregularly shaped lesion just lateral to his nose on the left   Neurological:      Mental Status: He is alert and oriented to person, place, and time. Mental status is at baseline.   Psychiatric:         Mood and Affect: Mood normal.         Behavior: Behavior normal.         Thought Content: Thought content normal.         Judgment: Judgment normal.         Assessment/Plan   Diagnoses and all orders for this visit:    Type 2 diabetes mellitus with microalbuminuria, without long-term current use of insulin (CMS/[facility])  (Primary)  Assessment & Plan:  -He did see his eye doctor in January, continues on metformin daily, encouraged him to cut back on sweets in general, and we will check A1c.  Microalbumin checked today.    Orders:  -     Hemoglobin A1c; Future  -     Microalbumin/Creatinine Ur  Random    Essential hypertension  Assessment & Plan:  -Actually very well-controlled today so continue current medicines and see me in 6 months      Coronary artery calcification seen on CT scan  Assessment & Plan:  -Dr. Bell had recommended a goal LDL less than 70.  He has only restarted 10 mg of the atorvastatin daily so we will see what his lipid panel looks like with this and adjust accordingly.  - patient is indicating that he does not want to go higher than 10 mg, but we will see how his LDL looks and have a discussion at that time    Orders:  -     Comprehensive metabolic panel; Future  -     Lipid panel; Future    Ascending aorta dilatation (CMS/HCC)  Assessment & Plan:  -Was noted to be stable at the cardiology appointment on echocardiogram compared to 2019 and continue to follow with her cardiologist      Other specified hypothyroidism  Assessment & Plan:  -His TSH was under good control but we will continue to follow.  His thyroid exam remains normal and he remains clinically euthyroid    Orders:  -     TSH 3rd Generation; Future    S/P prostatectomy  Assessment & Plan:  -See urologist for 6-month follow-up visit that is due in July as planned      Multiple lung nodules on CT  Assessment & Plan:  -He will have his CT scan done in July as the pulmonologist ordered in September      Normocytic anemia  Assessment & Plan:  -His iron studies were normal in December but we will continue to follow his hemoglobin with labs    Orders:  -     CBC and Differential; Future    Polyp of colon, unspecified part of colon, unspecified type  Assessment & Plan:  -They are in the process of scheduling the colonoscopy now they have received cardiac clearance for this summer in the next 3 months      Skin lesion of face  Comments:  He will see dermatology, Dr. Weinberg, in May to r/o melanoma    COVID-19 vaccination declined    Aortic ectasia, abdominal (CMS/HCC)  Assessment & Plan:  -We will repeat this in the fall for a 1  year follow-up after his CT of his abdomen last summer

## 2024-03-27 LAB
ALBUMIN/CREAT UR: 36 MG/G CREAT (ref 0–29)
CREAT UR-MCNC: 154.3 MG/DL
MICROALBUMIN UR-MCNC: 55 UG/ML

## 2024-04-15 DIAGNOSIS — E11.29 TYPE 2 DIABETES MELLITUS WITH DIABETIC MICROALBUMINURIA, WITHOUT LONG-TERM CURRENT USE OF INSULIN (CMS/HCC): Primary | ICD-10-CM

## 2024-04-15 DIAGNOSIS — R80.9 TYPE 2 DIABETES MELLITUS WITH DIABETIC MICROALBUMINURIA, WITHOUT LONG-TERM CURRENT USE OF INSULIN (CMS/HCC): Primary | ICD-10-CM

## 2024-04-16 ENCOUNTER — TELEPHONE (OUTPATIENT)
Dept: PRIMARY CARE | Facility: CLINIC | Age: 69
End: 2024-04-16
Payer: COMMERCIAL

## 2024-04-16 RX ORDER — METFORMIN HYDROCHLORIDE 500 MG/1
500 TABLET ORAL DAILY
Qty: 90 TABLET | Refills: 0 | Status: SHIPPED | OUTPATIENT
Start: 2024-04-16 | End: 2024-07-15

## 2024-04-16 NOTE — TELEPHONE ENCOUNTER
LMOM and sent my chart message   
Medicine Refill Request    Last Office Visit: 3/26/2024   Last Consult Visit: Visit date not found  Last Telemedicine Visit: Visit date not found    Next Appointment: 9/30/2024      Current Outpatient Medications:     amLODIPine (NORVASC) 10 mg tablet, Take 1 tablet (10 mg total) by mouth daily., Disp: 90 tablet, Rfl: 1    atorvastatin (LIPITOR) 20 mg tablet, Take 0.5 tablets (10 mg total) by mouth nightly., Disp: , Rfl:     levothyroxine (SYNTHROID) 137 mcg tablet, Take 1 tablet (137 mcg total) by mouth every morning., Disp: 90 tablet, Rfl: 3    lisinopriL-hydrochlorothiazide (PRINZIDE,ZESTORETIC) 20-12.5 mg per tablet, Take 1 tablet by mouth daily., Disp: 90 tablet, Rfl: 1    metFORMIN (GLUCOPHAGE) 500 mg tablet, Take 1 tablet (500 mg total) by mouth daily., Disp: , Rfl:       BP Readings from Last 3 Encounters:   03/26/24 134/72   02/08/24 (!) 148/78   01/16/24 (!) 153/89       Recent Lab results:  Lab Results   Component Value Date    CHOL 152 09/15/2023   ,   Lab Results   Component Value Date    HDL 35 (L) 09/15/2023   ,   Lab Results   Component Value Date    LDLCALC 95 09/15/2023   ,   Lab Results   Component Value Date    TRIG 109 09/15/2023        Lab Results   Component Value Date    GLUCOSE 141 (H) 12/18/2023   ,   Lab Results   Component Value Date    HGBA1C 6.8 (H) 09/15/2023         Lab Results   Component Value Date    CREATININE 1.3 12/18/2023       Lab Results   Component Value Date    TSH 1.24 09/15/2023           Lab Results   Component Value Date    HGBA1C 6.8 (H) 09/15/2023     
Please remind patient labs are due now  
12-Feb-2024 01:40

## 2024-05-08 ENCOUNTER — APPOINTMENT (OUTPATIENT)
Dept: LAB | Age: 69
End: 2024-05-08
Attending: FAMILY MEDICINE
Payer: COMMERCIAL

## 2024-05-08 DIAGNOSIS — D64.9 NORMOCYTIC ANEMIA: ICD-10-CM

## 2024-05-08 DIAGNOSIS — R80.9 TYPE 2 DIABETES MELLITUS WITH MICROALBUMINURIA, WITHOUT LONG-TERM CURRENT USE OF INSULIN (CMS/HCC): ICD-10-CM

## 2024-05-08 DIAGNOSIS — E03.8 OTHER SPECIFIED HYPOTHYROIDISM: ICD-10-CM

## 2024-05-08 DIAGNOSIS — I25.10 CORONARY ARTERY CALCIFICATION SEEN ON CT SCAN: ICD-10-CM

## 2024-05-08 DIAGNOSIS — E11.29 TYPE 2 DIABETES MELLITUS WITH MICROALBUMINURIA, WITHOUT LONG-TERM CURRENT USE OF INSULIN (CMS/HCC): ICD-10-CM

## 2024-05-08 DIAGNOSIS — R50.9 FEBRILE ILLNESS: ICD-10-CM

## 2024-05-08 LAB
ALBUMIN SERPL-MCNC: 4.1 G/DL (ref 3.5–5.7)
ALP SERPL-CCNC: 56 IU/L (ref 34–125)
ALT SERPL-CCNC: 13 IU/L (ref 7–52)
ANION GAP SERPL CALC-SCNC: 10 MEQ/L (ref 3–15)
AST SERPL-CCNC: 20 IU/L (ref 13–39)
BACTERIA URNS QL MICRO: ABNORMAL /HPF
BASOPHILS # BLD: 0.02 K/UL (ref 0.01–0.1)
BASOPHILS NFR BLD: 0.3 %
BILIRUB SERPL-MCNC: 0.4 MG/DL (ref 0.3–1.2)
BILIRUB UR QL STRIP.AUTO: NEGATIVE MG/DL
BUN SERPL-MCNC: 15 MG/DL (ref 7–25)
CALCIUM SERPL-MCNC: 9.1 MG/DL (ref 8.6–10.3)
CHLORIDE SERPL-SCNC: 103 MEQ/L (ref 98–107)
CHOLEST SERPL-MCNC: 154 MG/DL
CLARITY UR REFRACT.AUTO: CLEAR
CO2 SERPL-SCNC: 26 MEQ/L (ref 21–31)
COLOR UR AUTO: YELLOW
CREAT SERPL-MCNC: 0.9 MG/DL (ref 0.7–1.3)
DIFFERENTIAL METHOD BLD: ABNORMAL
EGFRCR SERPLBLD CKD-EPI 2021: >60 ML/MIN/1.73M*2
EOSINOPHIL # BLD: 0.22 K/UL (ref 0.04–0.54)
EOSINOPHIL NFR BLD: 3.7 %
ERYTHROCYTE [DISTWIDTH] IN BLOOD BY AUTOMATED COUNT: 13.1 % (ref 11.6–14.4)
EST. AVERAGE GLUCOSE BLD GHB EST-MCNC: 143 MG/DL
GLUCOSE SERPL-MCNC: 118 MG/DL (ref 70–99)
GLUCOSE UR STRIP.AUTO-MCNC: NEGATIVE MG/DL
HBA1C MFR BLD: 6.6 %
HCT VFR BLD AUTO: 39.8 % (ref 40.1–51)
HDLC SERPL-MCNC: 35 MG/DL
HDLC SERPL: 4.4 {RATIO}
HGB BLD-MCNC: 13.3 G/DL (ref 13.7–17.5)
HGB UR QL STRIP.AUTO: NEGATIVE
HYALINE CASTS #/AREA URNS LPF: ABNORMAL /LPF
IMM GRANULOCYTES # BLD AUTO: 0.01 K/UL (ref 0–0.08)
IMM GRANULOCYTES NFR BLD AUTO: 0.2 %
KETONES UR STRIP.AUTO-MCNC: NEGATIVE MG/DL
LDLC SERPL CALC-MCNC: 100 MG/DL
LEUKOCYTE ESTERASE UR QL STRIP.AUTO: NEGATIVE
LYMPHOCYTES # BLD: 1.8 K/UL (ref 1.2–3.5)
LYMPHOCYTES NFR BLD: 30.6 %
MCH RBC QN AUTO: 30.6 PG (ref 28–33.2)
MCHC RBC AUTO-ENTMCNC: 33.4 G/DL (ref 32.2–36.5)
MCV RBC AUTO: 91.5 FL (ref 83–98)
MONOCYTES # BLD: 0.52 K/UL (ref 0.3–1)
MONOCYTES NFR BLD: 8.8 %
MUCOUS THREADS URNS QL MICRO: ABNORMAL /LPF
NEUTROPHILS # BLD: 3.31 K/UL (ref 1.7–7)
NEUTS SEG NFR BLD: 56.4 %
NITRITE UR QL STRIP.AUTO: NEGATIVE
NONHDLC SERPL-MCNC: 119 MG/DL
NRBC BLD-RTO: 0 %
PDW BLD AUTO: 14.1 FL (ref 9.4–12.4)
PH UR STRIP.AUTO: 6 [PH]
PLATELET # BLD AUTO: 139 K/UL (ref 150–350)
POTASSIUM SERPL-SCNC: 4.3 MEQ/L (ref 3.5–5.1)
PROT SERPL-MCNC: 7.3 G/DL (ref 6–8.2)
PROT UR QL STRIP.AUTO: 1
RBC # BLD AUTO: 4.35 M/UL (ref 4.5–5.8)
RBC #/AREA URNS HPF: ABNORMAL /HPF
SODIUM SERPL-SCNC: 139 MEQ/L (ref 136–145)
SP GR UR REFRACT.AUTO: 1.02
SQUAMOUS URNS QL MICRO: ABNORMAL /HPF
TRIGL SERPL-MCNC: 93 MG/DL
TSH SERPL DL<=0.05 MIU/L-ACNC: 1 MIU/L (ref 0.34–5.6)
UROBILINOGEN UR STRIP-ACNC: 0.2 EU/DL
WBC # BLD AUTO: 5.88 K/UL (ref 3.8–10.5)
WBC #/AREA URNS HPF: ABNORMAL /HPF

## 2024-05-08 PROCEDURE — 80053 COMPREHEN METABOLIC PANEL: CPT

## 2024-05-08 PROCEDURE — 86618 LYME DISEASE ANTIBODY: CPT

## 2024-05-08 PROCEDURE — 83036 HEMOGLOBIN GLYCOSYLATED A1C: CPT

## 2024-05-08 PROCEDURE — 36415 COLL VENOUS BLD VENIPUNCTURE: CPT

## 2024-05-08 PROCEDURE — 84443 ASSAY THYROID STIM HORMONE: CPT

## 2024-05-08 PROCEDURE — 80061 LIPID PANEL: CPT

## 2024-05-08 PROCEDURE — 81001 URINALYSIS AUTO W/SCOPE: CPT

## 2024-05-08 PROCEDURE — 87086 URINE CULTURE/COLONY COUNT: CPT

## 2024-05-08 PROCEDURE — 85025 COMPLETE CBC W/AUTO DIFF WBC: CPT

## 2024-05-10 LAB
B BURGDOR AB SER IA-ACNC: 0.34 RATIO
BACTERIA UR CULT: NORMAL

## 2024-05-13 ENCOUNTER — OFFICE VISIT (OUTPATIENT)
Dept: PRIMARY CARE | Facility: CLINIC | Age: 69
End: 2024-05-13
Payer: COMMERCIAL

## 2024-05-13 ENCOUNTER — HOSPITAL ENCOUNTER (OUTPATIENT)
Dept: RADIOLOGY | Age: 69
Discharge: HOME | End: 2024-05-13
Attending: FAMILY MEDICINE
Payer: COMMERCIAL

## 2024-05-13 VITALS
SYSTOLIC BLOOD PRESSURE: 130 MMHG | OXYGEN SATURATION: 98 % | WEIGHT: 173.2 LBS | BODY MASS INDEX: 25.65 KG/M2 | TEMPERATURE: 97.9 F | RESPIRATION RATE: 17 BRPM | HEART RATE: 80 BPM | HEIGHT: 69 IN | DIASTOLIC BLOOD PRESSURE: 78 MMHG

## 2024-05-13 DIAGNOSIS — I10 ESSENTIAL HYPERTENSION: ICD-10-CM

## 2024-05-13 DIAGNOSIS — R80.9 TYPE 2 DIABETES MELLITUS WITH DIABETIC MICROALBUMINURIA, WITHOUT LONG-TERM CURRENT USE OF INSULIN (CMS/HCC): Primary | ICD-10-CM

## 2024-05-13 DIAGNOSIS — K63.5 POLYP OF COLON, UNSPECIFIED PART OF COLON, UNSPECIFIED TYPE: ICD-10-CM

## 2024-05-13 DIAGNOSIS — D64.9 NORMOCYTIC ANEMIA: ICD-10-CM

## 2024-05-13 DIAGNOSIS — E11.29 TYPE 2 DIABETES MELLITUS WITH DIABETIC MICROALBUMINURIA, WITHOUT LONG-TERM CURRENT USE OF INSULIN (CMS/HCC): Primary | ICD-10-CM

## 2024-05-13 DIAGNOSIS — E03.8 OTHER SPECIFIED HYPOTHYROIDISM: ICD-10-CM

## 2024-05-13 DIAGNOSIS — J06.9 VIRAL URI WITH COUGH: ICD-10-CM

## 2024-05-13 DIAGNOSIS — D69.6 THROMBOCYTOPENIA (CMS/HCC): ICD-10-CM

## 2024-05-13 DIAGNOSIS — I25.10 CORONARY ARTERY CALCIFICATION SEEN ON CT SCAN: ICD-10-CM

## 2024-05-13 PROCEDURE — 3078F DIAST BP <80 MM HG: CPT | Performed by: FAMILY MEDICINE

## 2024-05-13 PROCEDURE — 3075F SYST BP GE 130 - 139MM HG: CPT | Performed by: FAMILY MEDICINE

## 2024-05-13 PROCEDURE — 71046 X-RAY EXAM CHEST 2 VIEWS: CPT

## 2024-05-13 PROCEDURE — 99214 OFFICE O/P EST MOD 30 MIN: CPT | Performed by: FAMILY MEDICINE

## 2024-05-13 PROCEDURE — 3008F BODY MASS INDEX DOCD: CPT | Performed by: FAMILY MEDICINE

## 2024-05-13 RX ORDER — ATORVASTATIN CALCIUM 20 MG/1
20 TABLET, FILM COATED ORAL NIGHTLY
COMMUNITY
Start: 2024-05-13 | End: 2024-09-30 | Stop reason: SDUPTHER

## 2024-05-13 ASSESSMENT — ENCOUNTER SYMPTOMS
COUGH: 1
PALPITATIONS: 0
RHINORRHEA: 0
LIGHT-HEADEDNESS: 0
CONSTIPATION: 0
BLOOD IN STOOL: 0
CHEST TIGHTNESS: 0
ANAL BLEEDING: 0
SHORTNESS OF BREATH: 0
FEVER: 0
DIARRHEA: 0
CHILLS: 0
DIZZINESS: 0

## 2024-05-13 NOTE — ASSESSMENT & PLAN NOTE
-His #1 priority at this time is to get his colonoscopy arranged since he remains overdue and he will do so

## 2024-05-13 NOTE — ASSESSMENT & PLAN NOTE
-His A1c looks good with just 1 tablet of metformin daily so continue same and repeat A1c with next labs.  -His eye doctor is up-to-date from January

## 2024-05-13 NOTE — ASSESSMENT & PLAN NOTE
-Already much improved  -No evidence of superimposed bacterial infection on exam and his only symptom is a baseline minor amount of cough and chest congestion, likely due to his smoking and known emphysema  -Since I do hear this more localized to the left base, just to be safe, even though this cleared with cough, I would like for him to get a chest x-ray and I anticipate this will be normal  -We discussed the importance of vaccination to prevent illness

## 2024-05-13 NOTE — ASSESSMENT & PLAN NOTE
-As previously documented, has been intermittently present since at least 2016.  Is stable, as is his minor thrombocytopenia, which has fluctuated over the last 8 years as well intermittently    -Will repeat with iron studies in 3 months

## 2024-05-13 NOTE — ASSESSMENT & PLAN NOTE
-Remains with no symptoms in this regard, but since his LDL remains above goal at 100, he will increase his atorvastatin up to a full 20 mg tablet and we will repeat before I see him in September.  His goal LDL should be closer to 70.  He will let me know if any myopathy or abdominal pain with this

## 2024-05-13 NOTE — PROGRESS NOTES
"    Subjective      Patient ID: Rhys Campos is a 68 y.o. male.  1955      Here to review labs   -overall feeling well now.     -As documented, on 4/10 and 5/7, on both occasions, he had felt runny nose, stuffy nose, achiness, low-grade feeling of fever (though he did not have a fever), and each time symptoms resolved within 2 to 3 days.  He did test negative for COVID both times.  -His Lyme test was normal and his urine showed no evidence of urinary tract infection with a negative urine culture  -His hemoglobin was 13.3 and platelet 139  -these sx 100% resolved now with no recurrence  -denies ever feeling cp, sob, palpitations, dizziness.     Diabetes-his A1c is 0.6% on once daily metformin in the evening.  His FBG was 118.    Hypothyroidism-his TSH was 1.00    CAD-his LDL was 100 and HDL 35 on just 10 mg of atorvastatin daily        The following have been reviewed and updated as appropriate in this visit:   Tobacco  Meds  Problems  Med Hx  Surg Hx  Fam Hx       Review of Systems   Constitutional:  Negative for chills and fever.   HENT:  Negative for congestion and rhinorrhea (resolved now).    Respiratory:  Positive for cough (back to mild baseline cough from smoking with occasional mucous in chest). Negative for chest tightness and shortness of breath.    Cardiovascular:  Negative for chest pain, palpitations and leg swelling.   Gastrointestinal:  Negative for anal bleeding, blood in stool, constipation and diarrhea.   Neurological:  Negative for dizziness, syncope and light-headedness.       Objective     Vitals:    05/13/24 1132 05/13/24 1210   BP: 130/80 130/78   BP Location: Left upper arm Left upper arm   Patient Position: Sitting Sitting   Pulse: 80    Resp: 17    Temp: 36.6 °C (97.9 °F)    TempSrc: Temporal    SpO2: 98%    Weight: 78.6 kg (173 lb 3.2 oz)    Height: 1.753 m (5' 9\")      Body mass index is 25.58 kg/m².    Physical Exam  Constitutional:       General: He is not in acute " distress.     Appearance: Normal appearance. He is normal weight. He is not ill-appearing, toxic-appearing or diaphoretic.   HENT:      Head: Normocephalic and atraumatic.      Jaw: No trismus.      Right Ear: Hearing, tympanic membrane, ear canal and external ear normal.      Left Ear: Hearing, tympanic membrane, ear canal and external ear normal.      Nose: No septal deviation, mucosal edema or rhinorrhea.      Right Sinus: No maxillary sinus tenderness or frontal sinus tenderness.      Left Sinus: No maxillary sinus tenderness or frontal sinus tenderness.      Mouth/Throat:      Mouth: Mucous membranes are moist.      Dentition: Normal dentition.      Pharynx: Oropharynx is clear. No oropharyngeal exudate, posterior oropharyngeal erythema or uvula swelling.      Tonsils: No tonsillar exudate.      Comments: Some mild posterior pharyngeal mucus noted  Eyes:      General: No scleral icterus.  Neck:      Thyroid: No thyroid mass, thyromegaly or thyroid tenderness.   Cardiovascular:      Rate and Rhythm: Normal rate and regular rhythm.      Heart sounds: Normal heart sounds. No murmur heard.     No friction rub. No gallop.      Comments: Regular rate and rhythm with an occasional single asx ectopic beat noted in the context of regular rhythm  Pulmonary:      Effort: Pulmonary effort is normal. No respiratory distress.      Breath sounds: No stridor. Rhonchi (at L base but cleared with coughing) present. No wheezing or rales.   Musculoskeletal:      Right lower leg: No edema.      Left lower leg: No edema.   Lymphadenopathy:      Cervical: No cervical adenopathy.   Neurological:      Mental Status: He is alert and oriented to person, place, and time. Mental status is at baseline.         Assessment/Plan   Diagnoses and all orders for this visit:    Type 2 diabetes mellitus with diabetic microalbuminuria, without long-term current use of insulin (CMS/[facility]) (Primary)  Assessment & Plan:  -His A1c looks good with just 1  tablet of metformin daily so continue same and repeat A1c with next labs.  -His eye doctor is up-to-date from January    Orders:  -     Hemoglobin A1c; Future    Essential hypertension  Assessment & Plan:  -Actually looks very well-controlled today      Coronary artery calcification seen on CT scan  Assessment & Plan:  -Remains with no symptoms in this regard, but since his LDL remains above goal at 100, he will increase his atorvastatin up to a full 20 mg tablet and we will repeat before I see him in September.  His goal LDL should be closer to 70.  He will let me know if any myopathy or abdominal pain with this    Orders:  -     Comprehensive metabolic panel; Future  -     Lipid panel; Future    Other specified hypothyroidism    Normocytic anemia  Assessment & Plan:  -As previously documented, has been intermittently present since at least 2016.  Is stable, as is his minor thrombocytopenia, which has fluctuated over the last 8 years as well intermittently    -Will repeat with iron studies in 3 months    Orders:  -     CBC and Differential; Future  -     Iron and TIBC; Future  -     Ferritin; Future    Thrombocytopenia (CMS/HCC)  -     CBC and Differential; Future    Polyp of colon, unspecified part of colon, unspecified type  Assessment & Plan:  -His #1 priority at this time is to get his colonoscopy arranged since he remains overdue and he will do so      Viral URI with cough  Assessment & Plan:  -Already much improved  -No evidence of superimposed bacterial infection on exam and his only symptom is a baseline minor amount of cough and chest congestion, likely due to his smoking and known emphysema  -Since I do hear this more localized to the left base, just to be safe, even though this cleared with cough, I would like for him to get a chest x-ray and I anticipate this will be normal  -We discussed the importance of vaccination to prevent illness    Orders:  -     X-RAY CHEST 2 VIEWS; Future

## 2024-06-04 ENCOUNTER — APPOINTMENT (OUTPATIENT)
Dept: URBAN - METROPOLITAN AREA CLINIC 203 | Age: 69
Setting detail: DERMATOLOGY
End: 2024-06-06

## 2024-06-04 DIAGNOSIS — L72.0 EPIDERMAL CYST: ICD-10-CM

## 2024-06-04 DIAGNOSIS — D485 NEOPLASM OF UNCERTAIN BEHAVIOR OF SKIN: ICD-10-CM

## 2024-06-04 PROBLEM — D48.5 NEOPLASM OF UNCERTAIN BEHAVIOR OF SKIN: Status: ACTIVE | Noted: 2024-06-04

## 2024-06-04 PROCEDURE — 11102 TANGNTL BX SKIN SINGLE LES: CPT

## 2024-06-04 PROCEDURE — OTHER BIOPSY BY SHAVE METHOD: OTHER

## 2024-06-04 PROCEDURE — OTHER INCISION AND DRAINAGE: OTHER

## 2024-06-04 PROCEDURE — OTHER ORDER TESTS: OTHER

## 2024-06-04 PROCEDURE — 10060 I&D ABSCESS SIMPLE/SINGLE: CPT

## 2024-06-04 ASSESSMENT — LOCATION SIMPLE DESCRIPTION DERM
LOCATION SIMPLE: LEFT NOSE
LOCATION SIMPLE: POSTERIOR NECK

## 2024-06-04 ASSESSMENT — LOCATION DETAILED DESCRIPTION DERM
LOCATION DETAILED: RIGHT MEDIAL TRAPEZIAL NECK
LOCATION DETAILED: LEFT NASAL SIDEWALL

## 2024-06-04 ASSESSMENT — LOCATION ZONE DERM
LOCATION ZONE: NECK
LOCATION ZONE: NOSE

## 2024-06-04 NOTE — PROCEDURE: ORDER TESTS
Lab Facility: [id number]
Performing Laboratory: [id number]
Expected Date Of Service: 06/04/2024
Billing Type: Third-Party Bill
Bill For Surgical Tray: no

## 2024-06-04 NOTE — PROCEDURE: INCISION AND DRAINAGE
Detail Level: Detailed
Lesion Type: Cyst
Method: 11 blade
Curette: No
Anesthesia Type: 1% lidocaine with epinephrine
Size Of Lesion In Cm (Optional But May Be Required For Some Insurances): 2.5
Wound Care: Petrolatum
Dressing: dry sterile dressing
Epidermal Sutures: 4-0 Ethilon
Epidermal Closure: simple interrupted
Suture Text: The incision was partially closed with
Preparation Text: The area was prepped in the usual clean fashion.
Curette Text (Optional): After the contents were expressed a curette was used to partially remove the cyst wall.
Consent was obtained and risks were reviewed including but not limited to delayed wound healing, infection, need for multiple I and D's, and pain.
Post-Care Instructions: I reviewed with the patient in detail post-care instructions. Patient should keep wound covered and call the office should any redness, pain, swelling or worsening occur.

## 2024-06-04 NOTE — PROCEDURE: BIOPSY BY SHAVE METHOD
Detail Level: Detailed
Depth Of Biopsy: dermis
Was A Bandage Applied: Yes
Size Of Lesion In Cm: 0.6
X Size Of Lesion In Cm: 0
Biopsy Type: H and E
Biopsy Method: Dermablade
Anesthesia Type: 1% lidocaine with epinephrine
Anesthesia Volume In Cc: 0.5
Hemostasis: Drysol
Wound Care: Petrolatum
Dressing: bandage
Destruction After The Procedure: No
Type Of Destruction Used: Curettage
Curettage Text: The wound bed was treated with curettage after the biopsy was performed.
Cryotherapy Text: The wound bed was treated with cryotherapy after the biopsy was performed.
Electrodesiccation Text: The wound bed was treated with electrodesiccation after the biopsy was performed.
Electrodesiccation And Curettage Text: The wound bed was treated with electrodesiccation and curettage after the biopsy was performed.
Silver Nitrate Text: The wound bed was treated with silver nitrate after the biopsy was performed.
Lab: [id number]
Consent: Written consent was obtained and risks were reviewed including but not limited to scarring, infection, bleeding, scabbing, incomplete removal, nerve damage and allergy to anesthesia.
Post-Care Instructions: I reviewed with the patient in detail post-care instructions. Patient is to keep the biopsy site dry overnight, and then apply bacitracin twice daily until healed. Patient may apply hydrogen peroxide soaks to remove any crusting.
Notification Instructions: Patient will be notified of biopsy results. However, patient instructed to call the office if not contacted within 2 weeks.
Billing Type: Third-Party Bill
Information: Selecting Yes will display possible errors in your note based on the variables you have selected. This validation is only offered as a suggestion for you. PLEASE NOTE THAT THE VALIDATION TEXT WILL BE REMOVED WHEN YOU FINALIZE YOUR NOTE. IF YOU WANT TO FAX A PRELIMINARY NOTE YOU WILL NEED TO TOGGLE THIS TO 'NO' IF YOU DO NOT WANT IT IN YOUR FAXED NOTE.

## 2024-07-01 ENCOUNTER — TELEPHONE (OUTPATIENT)
Dept: PULMONOLOGY | Facility: HOSPITAL | Age: 69
End: 2024-07-01
Payer: COMMERCIAL

## 2024-07-01 VITALS — BODY MASS INDEX: 27.15 KG/M2 | HEIGHT: 67 IN | WEIGHT: 173 LBS

## 2024-07-01 DIAGNOSIS — Z12.2 SCREENING FOR MALIGNANT NEOPLASM OF RESPIRATORY ORGAN: ICD-10-CM

## 2024-07-01 DIAGNOSIS — F17.210 CIGARETTE SMOKER: Primary | ICD-10-CM

## 2024-07-01 DIAGNOSIS — Z87.891 PERSONAL HISTORY OF TOBACCO USE, PRESENTING HAZARDS TO HEALTH: ICD-10-CM

## 2024-07-01 NOTE — TELEPHONE ENCOUNTER
Patient was screened by navigator and is ready to be scheduled.  Patient can schedule once he has an authorization number, wife Suellen is calling for that.

## 2024-07-14 DIAGNOSIS — E11.29 TYPE 2 DIABETES MELLITUS WITH DIABETIC MICROALBUMINURIA, WITHOUT LONG-TERM CURRENT USE OF INSULIN (CMS/HCC): ICD-10-CM

## 2024-07-14 DIAGNOSIS — R80.9 TYPE 2 DIABETES MELLITUS WITH DIABETIC MICROALBUMINURIA, WITHOUT LONG-TERM CURRENT USE OF INSULIN (CMS/HCC): ICD-10-CM

## 2024-07-15 RX ORDER — METFORMIN HYDROCHLORIDE 500 MG/1
500 TABLET ORAL DAILY
Qty: 90 TABLET | Refills: 1 | Status: SHIPPED | OUTPATIENT
Start: 2024-07-15 | End: 2025-01-08

## 2024-07-15 NOTE — TELEPHONE ENCOUNTER
Medicine Refill Request    Last Office Visit: 5/13/2024   Last Consult Visit: Visit date not found  Last Telemedicine Visit: Visit date not found    Next Appointment: 9/30/2024      Current Outpatient Medications:     amLODIPine (NORVASC) 10 mg tablet, Take 1 tablet (10 mg total) by mouth daily., Disp: 90 tablet, Rfl: 1    atorvastatin (LIPITOR) 20 mg tablet, Take 1 tablet (20 mg total) by mouth nightly., Disp: , Rfl:     levothyroxine (SYNTHROID) 137 mcg tablet, Take 1 tablet (137 mcg total) by mouth every morning., Disp: 90 tablet, Rfl: 3    lisinopriL-hydrochlorothiazide (PRINZIDE,ZESTORETIC) 20-12.5 mg per tablet, Take 1 tablet by mouth daily., Disp: 90 tablet, Rfl: 1    metFORMIN (GLUCOPHAGE) 500 mg tablet, Take 1 tablet (500 mg total) by mouth daily., Disp: 90 tablet, Rfl: 0      BP Readings from Last 3 Encounters:   05/13/24 130/78   03/26/24 134/72   02/08/24 (!) 148/78       Recent Lab results:  Lab Results   Component Value Date    CHOL 154 05/08/2024   ,   Lab Results   Component Value Date    HDL 35 (L) 05/08/2024   ,   Lab Results   Component Value Date    LDLCALC 100 05/08/2024   ,   Lab Results   Component Value Date    TRIG 93 05/08/2024        Lab Results   Component Value Date    GLUCOSE 118 (H) 05/08/2024   ,   Lab Results   Component Value Date    HGBA1C 6.6 (H) 05/08/2024         Lab Results   Component Value Date    CREATININE 0.9 05/08/2024       Lab Results   Component Value Date    TSH 1.00 05/08/2024           Lab Results   Component Value Date    HGBA1C 6.6 (H) 05/08/2024

## 2024-08-12 ENCOUNTER — APPOINTMENT (OUTPATIENT)
Dept: LAB | Age: 69
End: 2024-08-12
Attending: FAMILY MEDICINE
Payer: COMMERCIAL

## 2024-08-12 DIAGNOSIS — E11.29 TYPE 2 DIABETES MELLITUS WITH DIABETIC MICROALBUMINURIA, WITHOUT LONG-TERM CURRENT USE OF INSULIN (CMS/HCC): ICD-10-CM

## 2024-08-12 DIAGNOSIS — D69.6 THROMBOCYTOPENIA (CMS/HCC): ICD-10-CM

## 2024-08-12 DIAGNOSIS — I25.10 CORONARY ARTERY CALCIFICATION SEEN ON CT SCAN: ICD-10-CM

## 2024-08-12 DIAGNOSIS — D64.9 NORMOCYTIC ANEMIA: ICD-10-CM

## 2024-08-12 DIAGNOSIS — R80.9 TYPE 2 DIABETES MELLITUS WITH DIABETIC MICROALBUMINURIA, WITHOUT LONG-TERM CURRENT USE OF INSULIN (CMS/HCC): ICD-10-CM

## 2024-08-12 LAB
ALBUMIN SERPL-MCNC: 4.3 G/DL (ref 3.5–5.7)
ALP SERPL-CCNC: 48 IU/L (ref 34–125)
ALT SERPL-CCNC: 15 IU/L (ref 7–52)
ANION GAP SERPL CALC-SCNC: 7 MEQ/L (ref 3–15)
AST SERPL-CCNC: 19 IU/L (ref 13–39)
BASOPHILS # BLD: 0.04 K/UL (ref 0.01–0.1)
BASOPHILS NFR BLD: 0.6 %
BILIRUB SERPL-MCNC: 0.5 MG/DL (ref 0.3–1.2)
BUN SERPL-MCNC: 16 MG/DL (ref 7–25)
CALCIUM SERPL-MCNC: 9.6 MG/DL (ref 8.6–10.3)
CHLORIDE SERPL-SCNC: 105 MEQ/L (ref 98–107)
CHOLEST SERPL-MCNC: 169 MG/DL
CO2 SERPL-SCNC: 29 MEQ/L (ref 21–31)
CREAT SERPL-MCNC: 1 MG/DL (ref 0.7–1.3)
DIFFERENTIAL METHOD BLD: NORMAL
EGFRCR SERPLBLD CKD-EPI 2021: >60 ML/MIN/1.73M*2
EOSINOPHIL # BLD: 0.21 K/UL (ref 0.04–0.54)
EOSINOPHIL NFR BLD: 3.3 %
ERYTHROCYTE [DISTWIDTH] IN BLOOD BY AUTOMATED COUNT: 13.1 % (ref 11.6–14.4)
EST. AVERAGE GLUCOSE BLD GHB EST-MCNC: 134 MG/DL
FERRITIN SERPL-MCNC: 115 NG/ML (ref 24–250)
GLUCOSE SERPL-MCNC: 110 MG/DL (ref 70–99)
HBA1C MFR BLD: 6.3 %
HCT VFR BLD AUTO: 41.6 % (ref 40.1–51)
HDLC SERPL-MCNC: 35 MG/DL
HDLC SERPL: 4.8 {RATIO}
HGB BLD-MCNC: 13.7 G/DL (ref 13.7–17.5)
IMM GRANULOCYTES # BLD AUTO: 0.01 K/UL (ref 0–0.08)
IMM GRANULOCYTES NFR BLD AUTO: 0.2 %
IRON SATN MFR SERPL: 23 % (ref 15–45)
IRON SERPL-MCNC: 80 UG/DL (ref 35–150)
LDLC SERPL CALC-MCNC: 111 MG/DL
LYMPHOCYTES # BLD: 2.13 K/UL (ref 1.2–3.5)
LYMPHOCYTES NFR BLD: 33.6 %
MCH RBC QN AUTO: 30.3 PG (ref 28–33.2)
MCHC RBC AUTO-ENTMCNC: 32.9 G/DL (ref 32.2–36.5)
MCV RBC AUTO: 92 FL (ref 83–98)
MONOCYTES # BLD: 0.57 K/UL (ref 0.3–1)
MONOCYTES NFR BLD: 9 %
NEUTROPHILS # BLD: 3.37 K/UL (ref 1.7–7)
NEUTS SEG NFR BLD: 53.3 %
NONHDLC SERPL-MCNC: 134 MG/DL
NRBC BLD-RTO: 0 %
PDW BLD AUTO: NORMAL FL
PLATELET # BLD AUTO: 159 K/UL (ref 150–350)
POTASSIUM SERPL-SCNC: 4.4 MEQ/L (ref 3.5–5.1)
PROT SERPL-MCNC: 7.5 G/DL (ref 6–8.2)
RBC # BLD AUTO: 4.52 M/UL (ref 4.5–5.8)
SODIUM SERPL-SCNC: 141 MEQ/L (ref 136–145)
TIBC SERPL-MCNC: 350 UG/DL (ref 270–460)
TRIGL SERPL-MCNC: 116 MG/DL
UIBC SERPL-MCNC: 270 UG/DL (ref 180–360)
WBC # BLD AUTO: 6.33 K/UL (ref 3.8–10.5)

## 2024-08-12 PROCEDURE — 82728 ASSAY OF FERRITIN: CPT

## 2024-08-12 PROCEDURE — 85025 COMPLETE CBC W/AUTO DIFF WBC: CPT

## 2024-08-12 PROCEDURE — 83036 HEMOGLOBIN GLYCOSYLATED A1C: CPT

## 2024-08-12 PROCEDURE — 36415 COLL VENOUS BLD VENIPUNCTURE: CPT

## 2024-08-12 PROCEDURE — 80053 COMPREHEN METABOLIC PANEL: CPT

## 2024-08-12 PROCEDURE — 83540 ASSAY OF IRON: CPT

## 2024-08-12 PROCEDURE — 80061 LIPID PANEL: CPT

## 2024-08-19 ENCOUNTER — HOSPITAL ENCOUNTER (OUTPATIENT)
Dept: RADIOLOGY | Facility: CLINIC | Age: 69
Discharge: HOME | End: 2024-08-19
Attending: INTERNAL MEDICINE
Payer: COMMERCIAL

## 2024-08-19 DIAGNOSIS — Z87.891 PERSONAL HISTORY OF TOBACCO USE, PRESENTING HAZARDS TO HEALTH: ICD-10-CM

## 2024-08-19 DIAGNOSIS — Z12.2 SCREENING FOR MALIGNANT NEOPLASM OF RESPIRATORY ORGAN: ICD-10-CM

## 2024-08-19 DIAGNOSIS — F17.210 CIGARETTE SMOKER: ICD-10-CM

## 2024-08-19 PROCEDURE — 71271 CT THORAX LUNG CANCER SCR C-: CPT

## 2024-08-30 DIAGNOSIS — I10 ESSENTIAL HYPERTENSION: ICD-10-CM

## 2024-08-30 RX ORDER — AMLODIPINE BESYLATE 10 MG/1
10 TABLET ORAL DAILY
Qty: 90 TABLET | Refills: 0 | Status: SHIPPED | OUTPATIENT
Start: 2024-08-30 | End: 2024-11-25

## 2024-09-27 ENCOUNTER — APPOINTMENT (OUTPATIENT)
Dept: URBAN - METROPOLITAN AREA CLINIC 203 | Age: 69
Setting detail: DERMATOLOGY
End: 2024-09-27

## 2024-09-27 DIAGNOSIS — L72.0 EPIDERMAL CYST: ICD-10-CM

## 2024-09-27 PROCEDURE — OTHER INTRALESIONAL KENALOG: OTHER

## 2024-09-27 PROCEDURE — 11900 INJECT SKIN LESIONS </W 7: CPT

## 2024-09-27 PROCEDURE — OTHER DEFER: OTHER

## 2024-09-27 ASSESSMENT — LOCATION ZONE DERM: LOCATION ZONE: TRUNK

## 2024-09-27 ASSESSMENT — LOCATION DETAILED DESCRIPTION DERM: LOCATION DETAILED: LEFT SUPERIOR UPPER BACK

## 2024-09-27 ASSESSMENT — LOCATION SIMPLE DESCRIPTION DERM: LOCATION SIMPLE: LEFT UPPER BACK

## 2024-09-27 NOTE — PROCEDURE: INTRALESIONAL KENALOG
Total Volume (Ccs): .3
Medical Necessity Clause: This procedure was medically necessary because the lesions that were treated were:
Administered By (Optional): JOHNSON
How Many Mls Were Removed From The 40 Mg/Ml (5ml) Vial When Preparing The Injectable Solution?: 0
Kenalog Preparation: Kenalog
Validate Note Data When Using Inventory: Yes
Kenalog Type Of Vial: Multiple Dose
Consent: The risks of atrophy were reviewed with the patient.
Include Z78.9 (Other Specified Conditions Influencing Health Status) As An Associated Diagnosis?: No
Detail Level: Detailed
Concentration Of Kenalog Solution Injected (Mg/Ml): 10.0
Show Inventory Tab: Hide

## 2024-09-29 PROBLEM — J06.9 VIRAL URI WITH COUGH: Status: RESOLVED | Noted: 2024-05-13 | Resolved: 2024-09-29

## 2024-09-29 PROBLEM — Z90.79 HISTORY OF PROSTATECTOMY: Status: ACTIVE | Noted: 2024-09-29

## 2024-09-29 PROBLEM — Z85.46 HISTORY OF PROSTATE CANCER: Status: ACTIVE | Noted: 2024-09-29

## 2024-09-30 ENCOUNTER — OFFICE VISIT (OUTPATIENT)
Dept: PRIMARY CARE | Facility: CLINIC | Age: 69
End: 2024-09-30
Payer: COMMERCIAL

## 2024-09-30 VITALS
BODY MASS INDEX: 25.51 KG/M2 | HEIGHT: 69 IN | HEART RATE: 62 BPM | WEIGHT: 172.2 LBS | OXYGEN SATURATION: 92 % | SYSTOLIC BLOOD PRESSURE: 124 MMHG | DIASTOLIC BLOOD PRESSURE: 76 MMHG | TEMPERATURE: 97.3 F

## 2024-09-30 DIAGNOSIS — I77.810 ASCENDING AORTA DILATATION (CMS/HCC): ICD-10-CM

## 2024-09-30 DIAGNOSIS — E11.29 TYPE 2 DIABETES MELLITUS WITH DIABETIC MICROALBUMINURIA, WITHOUT LONG-TERM CURRENT USE OF INSULIN (CMS/HCC): Primary | ICD-10-CM

## 2024-09-30 DIAGNOSIS — R80.9 TYPE 2 DIABETES MELLITUS WITH DIABETIC MICROALBUMINURIA, WITHOUT LONG-TERM CURRENT USE OF INSULIN (CMS/HCC): Primary | ICD-10-CM

## 2024-09-30 DIAGNOSIS — E78.00 PURE HYPERCHOLESTEROLEMIA: ICD-10-CM

## 2024-09-30 DIAGNOSIS — I10 ESSENTIAL HYPERTENSION: ICD-10-CM

## 2024-09-30 DIAGNOSIS — R91.8 MULTIPLE LUNG NODULES ON CT: ICD-10-CM

## 2024-09-30 DIAGNOSIS — D64.9 NORMOCYTIC ANEMIA: ICD-10-CM

## 2024-09-30 DIAGNOSIS — K63.5 POLYP OF COLON, UNSPECIFIED PART OF COLON, UNSPECIFIED TYPE: ICD-10-CM

## 2024-09-30 DIAGNOSIS — Z90.79 HISTORY OF PROSTATECTOMY: ICD-10-CM

## 2024-09-30 DIAGNOSIS — E03.8 OTHER SPECIFIED HYPOTHYROIDISM: ICD-10-CM

## 2024-09-30 DIAGNOSIS — I77.811 AORTIC ECTASIA, ABDOMINAL (CMS/HCC): ICD-10-CM

## 2024-09-30 DIAGNOSIS — Z23 NEED FOR IMMUNIZATION AGAINST INFLUENZA: ICD-10-CM

## 2024-09-30 PROCEDURE — 99214 OFFICE O/P EST MOD 30 MIN: CPT | Mod: 25 | Performed by: FAMILY MEDICINE

## 2024-09-30 PROCEDURE — 90471 IMMUNIZATION ADMIN: CPT | Performed by: FAMILY MEDICINE

## 2024-09-30 PROCEDURE — 3008F BODY MASS INDEX DOCD: CPT | Performed by: FAMILY MEDICINE

## 2024-09-30 PROCEDURE — 3078F DIAST BP <80 MM HG: CPT | Performed by: FAMILY MEDICINE

## 2024-09-30 PROCEDURE — 3074F SYST BP LT 130 MM HG: CPT | Performed by: FAMILY MEDICINE

## 2024-09-30 PROCEDURE — 90662 IIV NO PRSV INCREASED AG IM: CPT | Performed by: FAMILY MEDICINE

## 2024-09-30 RX ORDER — ASPIRIN 81 MG/1
81 TABLET ORAL DAILY
COMMUNITY
End: 2024-09-30

## 2024-09-30 RX ORDER — LEVOTHYROXINE SODIUM 137 UG/1
137 TABLET ORAL EVERY MORNING
Qty: 90 TABLET | Refills: 3 | Status: SHIPPED | OUTPATIENT
Start: 2024-09-30

## 2024-09-30 RX ORDER — ATORVASTATIN CALCIUM 20 MG/1
20 TABLET, FILM COATED ORAL NIGHTLY
Qty: 90 TABLET | Refills: 1 | Status: SHIPPED | OUTPATIENT
Start: 2024-09-30 | End: 2025-03-31

## 2024-09-30 RX ORDER — LISINOPRIL AND HYDROCHLOROTHIAZIDE 12.5; 2 MG/1; MG/1
1 TABLET ORAL DAILY
Qty: 90 TABLET | Refills: 1 | Status: SHIPPED | OUTPATIENT
Start: 2024-09-30 | End: 2025-03-31 | Stop reason: SDUPTHER

## 2024-09-30 ASSESSMENT — PATIENT HEALTH QUESTIONNAIRE - PHQ9
SUM OF ALL RESPONSES TO PHQ9 QUESTIONS 1 & 2: 0
SUM OF ALL RESPONSES TO PHQ9 QUESTIONS 1 & 2: 0

## 2024-09-30 ASSESSMENT — ENCOUNTER SYMPTOMS
FATIGUE: 0
DIZZINESS: 0
VOMITING: 0
CHILLS: 0
CHEST TIGHTNESS: 0
FEVER: 0
CONSTIPATION: 0
BLOOD IN STOOL: 0
ANAL BLEEDING: 0
LIGHT-HEADEDNESS: 0
WHEEZING: 0
SHORTNESS OF BREATH: 0

## 2024-09-30 NOTE — ASSESSMENT & PLAN NOTE
His 18-month follow-up is due now so this will be checked via ultrasound  Orders:    ULTRASOUND ABDOMINAL AORTA (RETROPERITONEAL LIMITED); Future

## 2024-09-30 NOTE — ASSESSMENT & PLAN NOTE
Blood pressure is perfect today so continue current regimen and see me in 6 months  Orders:    TSH 3rd Generation; Future    lisinopriL-hydrochlorothiazide (PRINZIDE,ZESTORETIC) 20-12.5 mg per tablet; Take 1 tablet by mouth daily.

## 2024-09-30 NOTE — ASSESSMENT & PLAN NOTE
Up-to-date with seeing the urologist earlier this month and he was just recommended annual follow-up.

## 2024-09-30 NOTE — PROGRESS NOTES
"    Subjective      Patient ID: Rhys Campos is a 69 y.o. male.  1955      Here for follow-up of his blood pressure and blood sugars  -His A1c was 6.3% with his labs  -His LDL increased from 100 up to 111 and HDL was 35.  I asked him to increase the atorvastatin from 20 mg up to 40 mg, but he wanted to wait until we discussed today  -Hemoglobin was normal at 13.7 with normal iron studies  -He last saw his cardiologist in January    -He saw his urologist in September and was recommended just to follow-up in 1 year and his PSA was checked    -He had a CT scan of his lungs done in August which was stable lung RADS 2.        The following have been reviewed and updated as appropriate in this visit:   Allergies  Problems  Med Hx  Surg Hx  Fam Hx       Review of Systems   Constitutional:  Negative for chills, fatigue and fever.   Respiratory:  Negative for chest tightness, shortness of breath and wheezing.    Cardiovascular:  Negative for chest pain and leg swelling.   Gastrointestinal:  Negative for anal bleeding, blood in stool, constipation and vomiting.   Genitourinary:         Good stream   Neurological:  Negative for dizziness, syncope and light-headedness.       Objective     Vitals:    09/30/24 1439 09/30/24 1538   BP: 124/80 124/76   BP Location: Right upper arm Left upper arm   Patient Position: Sitting Sitting   Pulse: 62    Temp: 36.3 °C (97.3 °F)    TempSrc: Temporal    SpO2: 92%    Weight: 78.1 kg (172 lb 3.2 oz)    Height: 1.753 m (5' 9.02\")      Body mass index is 25.42 kg/m².    Physical Exam  Constitutional:       General: He is not in acute distress.     Appearance: Normal appearance. He is normal weight. He is not ill-appearing, toxic-appearing or diaphoretic.   HENT:      Head: Normocephalic and atraumatic.   Neck:      Thyroid: No thyroid mass, thyromegaly or thyroid tenderness.   Cardiovascular:      Rate and Rhythm: Normal rate and regular rhythm.      Heart sounds: Murmur (2/6 perfecto " heard best RUSB) heard.      No friction rub. No gallop.      Comments: Regular rhythm with occasional single asx ectopic beats (consistent with prior EKG report from January the cardiology office)  Pulmonary:      Effort: Pulmonary effort is normal. No respiratory distress.      Breath sounds: Normal breath sounds. No stridor. No wheezing, rhonchi or rales.   Abdominal:      General: Bowel sounds are normal. There is no distension.      Palpations: Abdomen is soft. There is no mass.      Tenderness: There is no abdominal tenderness. There is no guarding or rebound.      Hernia: No hernia is present.   Musculoskeletal:      Right lower leg: No edema.      Left lower leg: No edema.   Lymphadenopathy:      Cervical: No cervical adenopathy.   Neurological:      Mental Status: He is alert and oriented to person, place, and time. Mental status is at baseline.   Psychiatric:         Mood and Affect: Mood normal.         Behavior: Behavior normal.         Thought Content: Thought content normal.         Judgment: Judgment normal.         Assessment & Plan  Type 2 diabetes mellitus with diabetic microalbuminuria, without long-term current use of insulin (CMS/[facility])  His A1c was very well-controlled.  We will check his microalbumin urine sample with next labs.  He will see his eye doctor once per year and he is due in January.    Orders:    Hemoglobin A1c; Future    Basic metabolic panel; Future    Microalbumin/Creatinine Ur Random; Future    Essential hypertension  Blood pressure is perfect today so continue current regimen and see me in 6 months  Orders:    TSH 3rd Generation; Future    lisinopriL-hydrochlorothiazide (PRINZIDE,ZESTORETIC) 20-12.5 mg per tablet; Take 1 tablet by mouth daily.    Pure hypercholesterolemia  We discussed his LDL and goal to be close to 70.  He was eating more sweets over the summer, but 1 to make a concerted effort to reduce over the next 6 months.  He knows my recommendation is to increase  atorvastatin up to 40 mg, but he declines this currently.  He agrees to do so though if LDL remains above 100 next test  Orders:    Lipid panel; Future    Normocytic anemia  Normalized on labs and can be trended annually       Multiple lung nodules on CT  His CT scan from August showed stability       Polyp of colon, unspecified part of colon, unspecified type  He still has not yet scheduled his colonoscopy but understands importance of doing so ASAP       Other specified hypothyroidism  His annual TSH check will be done with next labs and his thyroid exam remains normal  Orders:    levothyroxine (SYNTHROID) 137 mcg tablet; Take 1 tablet (137 mcg total) by mouth every morning.    History of prostatectomy  Up-to-date with seeing the urologist earlier this month and he was just recommended annual follow-up.       Aortic ectasia, abdominal (CMS/HCC)  His 18-month follow-up is due now so this will be checked via ultrasound  Orders:    ULTRASOUND ABDOMINAL AORTA (RETROPERITONEAL LIMITED); Future    Ascending aorta dilatation (CMS/HCC)  -Recent echocardiogram was noted to be stable dating back to 2019 so apparently was just recommended as needed follow-up with Dr. Bell         Need for immunization against influenza

## 2024-09-30 NOTE — ASSESSMENT & PLAN NOTE
-Recent echocardiogram was noted to be stable dating back to 2019 so apparently was just recommended as needed follow-up with Dr. Bell

## 2024-09-30 NOTE — ASSESSMENT & PLAN NOTE
His A1c was very well-controlled.  We will check his microalbumin urine sample with next labs.  He will see his eye doctor once per year and he is due in January.    Orders:    Hemoglobin A1c; Future    Basic metabolic panel; Future    Microalbumin/Creatinine Ur Random; Future

## 2024-09-30 NOTE — ASSESSMENT & PLAN NOTE
His annual TSH check will be done with next labs and his thyroid exam remains normal  Orders:    levothyroxine (SYNTHROID) 137 mcg tablet; Take 1 tablet (137 mcg total) by mouth every morning.

## 2024-10-31 ENCOUNTER — APPOINTMENT (OUTPATIENT)
Dept: URBAN - METROPOLITAN AREA CLINIC 203 | Age: 69
Setting detail: DERMATOLOGY
End: 2024-10-31

## 2024-10-31 DIAGNOSIS — L72.0 EPIDERMAL CYST: ICD-10-CM

## 2024-10-31 PROCEDURE — OTHER MIPS QUALITY: OTHER

## 2024-10-31 PROCEDURE — 12031 INTMD RPR S/A/T/EXT 2.5 CM/<: CPT

## 2024-10-31 PROCEDURE — 11402 EXC TR-EXT B9+MARG 1.1-2 CM: CPT

## 2024-10-31 PROCEDURE — OTHER EXCISION (NO PATHOLOGY): OTHER

## 2024-10-31 ASSESSMENT — LOCATION DETAILED DESCRIPTION DERM: LOCATION DETAILED: RIGHT SUPERIOR UPPER BACK

## 2024-10-31 ASSESSMENT — LOCATION SIMPLE DESCRIPTION DERM: LOCATION SIMPLE: RIGHT UPPER BACK

## 2024-10-31 ASSESSMENT — LOCATION ZONE DERM: LOCATION ZONE: TRUNK

## 2024-10-31 NOTE — PROCEDURE: EXCISION (NO PATHOLOGY)
Medical Necessity Information: It is in your best interest to select a reason for this procedure from the list below. All of these items fulfill various CMS LCD requirements except lesion extends to a margin.
Include Z78.9 (Other Specified Conditions Influencing Health Status) As An Associated Diagnosis?: No
Medical Necessity Clause: This procedure was medically necessary because the lesion that was treated was:
Size Of Lesion In Cm: 2
X Size Of Lesion In Cm (Optional): 0
Excision Method: Elliptical
Repair Type: Intermediate
Intermediate / Complex Repair - Final Wound Length In Cm: 1
Undermining Type: Entire Wound
Debridement Text: The wound edges were debrided prior to proceeding with the closure to facilitate wound healing.
Helical Rim Text: The closure involved the helical rim.
Vermilion Border Text: The closure involved the vermilion border.
Nostril Rim Text: The closure involved the nostril rim.
Retention Suture Text: Retention sutures were placed to support the closure and prevent dehiscence.
Suture Removal: 12 days
Detail Level: Detailed
Excision Depth: adipose tissue
Scalpel Size: 15 blade
Anesthesia Type: 1% lidocaine with epinephrine
Additional Anesthesia Volume In Cc: 6
Hemostasis: Electrocautery
Estimated Blood Loss (Cc): minimal
Deep Sutures: 3-0 Polysorb
Epidermal Sutures: 3-0 Nylon
Epidermal Closure: running
Additional Epidermal Closure (Optional): simple interrupted
Wound Care: Petrolatum
Dressing: dry sterile dressing
Complex Repair Preamble Text (Leave Blank If You Do Not Want): Extensive wide undermining was performed.
Intermediate Repair Preamble Text (Leave Blank If You Do Not Want): Undermining was performed with blunt dissection.
Fusiform Excision Additional Text (Leave Blank If You Do Not Want): The margin was drawn around the clinically apparent lesion.  A fusiform shape was then drawn on the skin incorporating the lesion and margins.  Incisions were then made along these lines to the appropriate tissue plane and the lesion was extirpated.
Eliptical Excision Additional Text (Leave Blank If You Do Not Want): The margin was drawn around the clinically apparent lesion.  An elliptical shape was then drawn on the skin incorporating the lesion and margins.  Incisions were then made along these lines to the appropriate tissue plane and the lesion was extirpated.
Saucerization Excision Additional Text (Leave Blank If You Do Not Want): The margin was drawn around the clinically apparent lesion.  Incisions were then made along these lines, in a tangential fashion, to the appropriate tissue plane and the lesion was extirpated.
Slit Excision Additional Text (Leave Blank If You Do Not Want): A linear line was drawn on the skin overlying the lesion. An incision was made slowly until the lesion was visualized.  Once visualized, the lesion was removed with blunt dissection.
Consent was obtained from the patient. The risks and benefits to therapy were discussed in detail. Specifically, the risks of infection, scarring, bleeding, prolonged wound healing, incomplete removal, allergy to anesthesia, nerve injury and recurrence were addressed. Prior to the procedure, the treatment site was clearly identified and confirmed by the patient. All components of Universal Protocol/PAUSE Rule completed.
Render Post-Care Instructions In Note?: yes
Post-Care Instructions: I reviewed with the patient in detail post-care instructions. Patient is not to engage in any heavy lifting, exercise, or swimming for the next 14 days. Should the patient develop any fevers, chills, bleeding, severe pain patient will contact the office immediately.

## 2024-11-01 ENCOUNTER — TELEPHONE (OUTPATIENT)
Dept: CARDIOLOGY | Facility: CLINIC | Age: 69
End: 2024-11-01
Payer: COMMERCIAL

## 2024-11-01 NOTE — TELEPHONE ENCOUNTER
The patient is scheduled for a colonoscopy on 11/27/24 with Dr. Jaramillo.  They are asking for clearance.  I called and left the patient a message asking for a call back to schedule.  The fax number is [phone]

## 2024-11-12 ENCOUNTER — APPOINTMENT (OUTPATIENT)
Dept: URBAN - METROPOLITAN AREA CLINIC 203 | Age: 69
Setting detail: DERMATOLOGY
End: 2024-11-22

## 2024-11-12 DIAGNOSIS — L72.0 EPIDERMAL CYST: ICD-10-CM

## 2024-11-12 PROCEDURE — OTHER SUTURE REMOVAL (GLOBAL PERIOD): OTHER

## 2024-11-12 PROCEDURE — 99024 POSTOP FOLLOW-UP VISIT: CPT

## 2024-11-12 ASSESSMENT — LOCATION SIMPLE DESCRIPTION DERM: LOCATION SIMPLE: RIGHT UPPER BACK

## 2024-11-12 ASSESSMENT — LOCATION DETAILED DESCRIPTION DERM: LOCATION DETAILED: RIGHT SUPERIOR UPPER BACK

## 2024-11-12 ASSESSMENT — LOCATION ZONE DERM: LOCATION ZONE: TRUNK

## 2024-11-12 NOTE — PROCEDURE: SUTURE REMOVAL (GLOBAL PERIOD)
Detail Level: Detailed
Add 35461 Cpt? (Important Note: In 2017 The Use Of 69612 Is Being Tracked By Cms To Determine Future Global Period Reimbursement For Global Periods): yes
Suture Removal Completed By (Optional): LANDON

## 2024-11-19 ENCOUNTER — TELEPHONE (OUTPATIENT)
Dept: CARDIOLOGY | Facility: CLINIC | Age: 69
End: 2024-11-19

## 2024-11-19 ENCOUNTER — OFFICE VISIT (OUTPATIENT)
Dept: CARDIOLOGY | Facility: CLINIC | Age: 69
End: 2024-11-19
Payer: COMMERCIAL

## 2024-11-19 VITALS
BODY MASS INDEX: 25.33 KG/M2 | HEIGHT: 69 IN | SYSTOLIC BLOOD PRESSURE: 138 MMHG | OXYGEN SATURATION: 98 % | WEIGHT: 171 LBS | DIASTOLIC BLOOD PRESSURE: 90 MMHG | HEART RATE: 61 BPM

## 2024-11-19 DIAGNOSIS — I77.810 ASCENDING AORTA DILATATION (CMS/HCC): ICD-10-CM

## 2024-11-19 DIAGNOSIS — I10 ESSENTIAL HYPERTENSION: ICD-10-CM

## 2024-11-19 DIAGNOSIS — E78.00 PURE HYPERCHOLESTEROLEMIA: ICD-10-CM

## 2024-11-19 DIAGNOSIS — I25.10 CORONARY ARTERY CALCIFICATION SEEN ON CT SCAN: ICD-10-CM

## 2024-11-19 DIAGNOSIS — Z01.810 PREOP CARDIOVASCULAR EXAM: Primary | ICD-10-CM

## 2024-11-19 LAB
ATRIAL RATE: 61
ATRIAL RATE: 62
P AXIS: 45
P AXIS: 50
PR INTERVAL: 150
PR INTERVAL: 152
QRS DURATION: 104
QRS DURATION: 104
QT INTERVAL: 444
QT INTERVAL: 444
QTC CALCULATION(BAZETT): 446
QTC CALCULATION(BAZETT): 450
R AXIS: -20
R AXIS: -30
T WAVE AXIS: 14
T WAVE AXIS: 3
VENTRICULAR RATE: 61
VENTRICULAR RATE: 62

## 2024-11-19 PROCEDURE — 3075F SYST BP GE 130 - 139MM HG: CPT | Performed by: NURSE PRACTITIONER

## 2024-11-19 PROCEDURE — 93000 ELECTROCARDIOGRAM COMPLETE: CPT | Performed by: NURSE PRACTITIONER

## 2024-11-19 PROCEDURE — 99214 OFFICE O/P EST MOD 30 MIN: CPT | Performed by: NURSE PRACTITIONER

## 2024-11-19 PROCEDURE — 3008F BODY MASS INDEX DOCD: CPT | Performed by: NURSE PRACTITIONER

## 2024-11-19 PROCEDURE — 3080F DIAST BP >= 90 MM HG: CPT | Performed by: NURSE PRACTITIONER

## 2024-11-19 ASSESSMENT — ENCOUNTER SYMPTOMS
PALPITATIONS: 0
SYNCOPE: 0
DYSPNEA ON EXERTION: 0
IRREGULAR HEARTBEAT: 0
DIZZINESS: 0
LIGHT-HEADEDNESS: 0
BRUISES/BLEEDS EASILY: 0
SHORTNESS OF BREATH: 0

## 2024-11-19 NOTE — PATIENT INSTRUCTIONS
- take amlodipine and atorvastatin on schedule  - hold the lisinopril-hctz the morning of procedure

## 2024-11-19 NOTE — ASSESSMENT & PLAN NOTE
- His blood pressure is acceptable in the office today, reasonably controlled with home readings.  - Continue same dose lisinopril/HCTZ, amlodipine.  - In addition to medications, he should maintain a low-sodium diet.  - He was advised to hold his lisinopril/HCTZ on the morning of his procedure.

## 2024-11-19 NOTE — ASSESSMENT & PLAN NOTE
- His LDL was 111 when most recently checked.  He has been watching his diet and plans to have this rechecked by his PCP in the next couple of months.

## 2024-11-19 NOTE — ASSESSMENT & PLAN NOTE
TTE 2/8/24: 4.4 cm ascending aorta dilatation  - stable compared to study in 2019.   -BP management as noted under hypertension plan  - Continue to follow with periodic surveillance imaging.

## 2024-11-19 NOTE — ASSESSMENT & PLAN NOTE
- He denies anginal symptoms and His EKG has no ischemic changes. He is tolerating current medical regimen without side effects.   - We will continue the same medications.

## 2024-11-19 NOTE — ASSESSMENT & PLAN NOTE
Plan is for colonoscopy with Dr. Jaramillo  on 11/21/24   - Pt has no active cardiopulmonary symptoms  - Pt has adequate functional capacity, able to walk 2 blocks or climb a flight of stairs without cardiopulmonary limitation at recent baseline  - This procedure is not included in the NSQIP surgical database and so the Lopez and other modern non-cardiac surgical risk calculators are not applicable. However:   - In historic registries, these were found to be low risk procedures (<1% risk major adverse cardiac event [MACE] in all comers)  - Given low risk of MACE (<1%) and absence of acute cardiac decompensation; additional testing or intervention from our standpoint would not   - He was advised to hold his lisinopril-HCTZ the morning of his procedure.  Pt is an appropriate candidate from our perspective

## 2024-11-19 NOTE — LETTER
November 19, 2024     Clint Coker Jr., DO  [id number] [facility]  [address] [address]    Patient: Rhys Campos  YOB: 1955  Date of Visit: 11/19/2024      Dear Dr. Coker:    Thank you for referring Rhys Campos to me for evaluation. Below are my notes for this consultation.    If you have questions, please do not hesitate to call me. I look forward to following your patient along with you.         Sincerely,        DONNA Alvarado        CC: MD Boy Torres Jr., MD Nolan, Kimberly W, CRNP  11/19/2024  5:09 PM  Sign when Signing Visit       Pre-Operative   Consult Note         Reason for visit:   Chief Complaint   Patient presents with   • Cardiac Clearance       HPI    Rhys Campos comes to the office today for preoperative cardiac evaluation prior to a colonoscopy with Dr. Jaramillo  on 11/21/24.     He was last seen in the office on 1/16/24 by Dr. Breann Fournier for CACS 182 (Left Main 13, LAD 1, , RCA 0) on 9/7/19, hypertension, dyslipidemia and diabetes. He had been referred by heis PCP for re-evaluation due to abnormal EKG/RBB. He denied cardiac complaints. He exercised regualrly. He had stopped his rosuvastatin. It was recommended that he have an echo and resume his rosuvastatin.     He presents today with his daughter. He has been feeling well. He is now taking atorvastatin. He is active at work as  for the [employer] and exercises at the gym. He can walk at least 2 blocks and climb a flight of stairs without CV limitations. He denies chest pain/discomfort, shortness of breath, feeling lightheaded/dizzy, syncope/near syncope and LE edema.     He checks his blood pressure at home, this is usually 125/80.  Past Medical History:   Diagnosis Date   • BPH (benign prostatic hyperplasia)    • Coronary artery disease     CACS 182:  Left Main 13, LAD 1, , RCA 0 on 9/7/19,   • COVID-19 vaccination not done    • Fatty  liver    • Graves disease    • Hypertension    • Hypothyroidism    • Lipid disorder    • Liver disease    • Male erectile dysfunction    • Type 2 diabetes mellitus (CMS/HCC)        Past Surgical History   Procedure Laterality Date   • Cholecystectomy  2020   • CHOLECYSTECTOMY LAPAROSCOPIC N/A 2020    Performed by Zak Ivory MD at  OR   • Colonoscopy     • Eye surgery Bilateral     from Graves   • Prostate biopsy  2023   • Prostatectomy  2023   • Robotic simple prostatectomy N/A 2023    Performed by Forrest Mistry DO at  OR   • Sinus surgery         Social History     Tobacco Use   Smoking Status Every Day   • Current packs/day: 0.75   • Average packs/day: 0.7 packs/day for 44.9 years (33.7 ttl pk-yrs)   • Types: Cigarettes   • Start date:    • Passive exposure: Past   Smokeless Tobacco Never     Social History     Tobacco Use   • Smoking status: Every Day     Current packs/day: 0.75     Average packs/day: 0.7 packs/day for 44.9 years (33.7 ttl pk-yrs)     Types: Cigarettes     Start date:      Passive exposure: Past   • Smokeless tobacco: Never   Vaping Use   • Vaping status: Never Used   Substance Use Topics   • Alcohol use: No   • Drug use: No       Family History   Problem Relation Name Age of Onset   • Other Biological Mother          natural causes -  at age 75   • Hypertension Biological Father     • Other Biological Father          natural causes -  at age 78   • No Known Problems Biological Sister     • Lung cancer Neg Hx         Allergies:  Nickel    Current Outpatient Medications   Medication Sig Dispense Refill   • amLODIPine (NORVASC) 10 mg tablet take 1 tablet by mouth once daily 90 tablet 0   • atorvastatin (LIPITOR) 20 mg tablet Take 1 tablet (20 mg total) by mouth nightly. 90 tablet 1   • levothyroxine (SYNTHROID) 137 mcg tablet Take 1 tablet (137 mcg total) by mouth every morning. 90 tablet 3   • lisinopriL-hydrochlorothiazide (PRINZIDE,ZESTORETIC)  20-12.5 mg per tablet Take 1 tablet by mouth daily. 90 tablet 1   • metFORMIN (GLUCOPHAGE) 500 mg tablet take 1 tablet by mouth once daily 90 tablet 1     No current facility-administered medications for this visit.       Review of Systems   Cardiovascular:  Negative for chest pain, dyspnea on exertion, irregular heartbeat, leg swelling, palpitations and syncope.   Respiratory:  Negative for shortness of breath.    Hematologic/Lymphatic: Does not bruise/bleed easily.   Neurological:  Negative for dizziness and light-headedness.       Objective    Vitals:    11/19/24 1558   BP: (!) 138/90   Pulse: 61   SpO2: 98%       Wt Readings from Last 1 Encounters:   11/19/24 77.6 kg (171 lb)       Physical Exam  Vitals reviewed.   Constitutional:       General: He is not in acute distress.     Appearance: Normal appearance.   HENT:      Head: Normocephalic and atraumatic.   Eyes:      General: No scleral icterus.  Neck:      Vascular: No carotid bruit.   Cardiovascular:      Rate and Rhythm: Normal rate and regular rhythm.      Heart sounds: S1 normal and S2 normal. No murmur heard.     No S3 or S4 sounds.   Pulmonary:      Effort: Pulmonary effort is normal.      Breath sounds: Normal breath sounds.   Musculoskeletal:      Right lower leg: No edema.      Left lower leg: No edema.   Skin:     General: Skin is warm and dry.   Neurological:      General: No focal deficit present.      Mental Status: He is alert. Mental status is at baseline.   Psychiatric:         Mood and Affect: Mood normal.         Thought Content: Thought content normal.         Judgment: Judgment normal.       ECG   Normal sinus rhythm   Nonspecific ST abnormality   Abnormal ECG     Labs   Lab Results   Component Value Date    WBC 6.33 08/12/2024    HGB 13.7 08/12/2024    HCT 41.6 08/12/2024     08/12/2024    ALT 15 08/12/2024    AST 19 08/12/2024     08/12/2024    K 4.4 08/12/2024     08/12/2024    CREATININE 1.0 08/12/2024    BUN 16  08/12/2024    CO2 29 08/12/2024    PT 13.4 05/24/2023    GLUCOSE 110 (H) 08/12/2024    HGBA1C 6.3 (H) 08/12/2024         Estimated Functional Capacity  The patient has a Duke Activity Status Index score of 52.95, corresponding to an expected exertional capacity of 9.25 METS.          Assessment/Plan    Coronary artery calcification seen on CT scan  - He denies anginal symptoms and His EKG has no ischemic changes. He is tolerating current medical regimen without side effects.   - We will continue the same medications.       Ascending aorta dilatation (CMS/HCC)  TTE 2/8/24: 4.4 cm ascending aorta dilatation  - stable compared to study in 2019.   -BP management as noted under hypertension plan  - Continue to follow with periodic surveillance imaging.    Essential hypertension  - His blood pressure is acceptable in the office today, reasonably controlled with home readings.  - Continue same dose lisinopril/HCTZ, amlodipine.  - In addition to medications, he should maintain a low-sodium diet.  - He was advised to hold his lisinopril/HCTZ on the morning of his procedure.    Pure hypercholesterolemia  - His LDL was 111 when most recently checked.  He has been watching his diet and plans to have this rechecked by his PCP in the next couple of months.    Preop cardiovascular exam  Plan is for colonoscopy with Dr. Jaramillo  on 11/21/24   - Pt has no active cardiopulmonary symptoms  - Pt has adequate functional capacity, able to walk 2 blocks or climb a flight of stairs without cardiopulmonary limitation at recent baseline  - This procedure is not included in the NSQIP surgical database and so the Lopez and other modern non-cardiac surgical risk calculators are not applicable. However:   - In historic registries, these were found to be low risk procedures (<1% risk major adverse cardiac event [MACE] in all comers)  - Given low risk of MACE (<1%) and absence of acute cardiac decompensation; additional testing or intervention from  our standpoint would not   - He was advised to hold his lisinopril-HCTZ the morning of his procedure.  Pt is an appropriate candidate from our perspective                  DONNA Alvarado  11/19/2024

## 2024-11-19 NOTE — PROGRESS NOTES
Pre-Operative   Consult Note         Reason for visit:   Chief Complaint   Patient presents with    Cardiac Clearance       HPI     Rhys Campos comes to the office today for preoperative cardiac evaluation prior to a colonoscopy with Dr. Jaramillo  on 11/21/24.     He was last seen in the office on 1/16/24 by Dr. Breann Fournier for CACS 182 (Left Main 13, LAD 1, , RCA 0) on 9/7/19, hypertension, dyslipidemia and diabetes. He had been referred by heis PCP for re-evaluation due to abnormal EKG/RBB. He denied cardiac complaints. He exercised regualrly. He had stopped his rosuvastatin. It was recommended that he have an echo and resume his rosuvastatin.     He presents today with his daughter. He has been feeling well. He is now taking atorvastatin. He is active at work as  for the [employer] and exercises at the gym. He can walk at least 2 blocks and climb a flight of stairs without CV limitations. He denies chest pain/discomfort, shortness of breath, feeling lightheaded/dizzy, syncope/near syncope and LE edema.     He checks his blood pressure at home, this is usually 125/80.  Past Medical History:   Diagnosis Date    BPH (benign prostatic hyperplasia)     Coronary artery disease     CACS 182:  Left Main 13, LAD 1, , RCA 0 on 9/7/19,    COVID-19 vaccination not done     Fatty liver     Graves disease     Hypertension     Hypothyroidism     Lipid disorder     Liver disease     Male erectile dysfunction     Type 2 diabetes mellitus (CMS/HCC)        Past Surgical History   Procedure Laterality Date    Cholecystectomy  05/2020    CHOLECYSTECTOMY LAPAROSCOPIC N/A 5/21/2020    Performed by Zak Ivory MD at  OR    Colonoscopy      Eye surgery Bilateral     from Graves    Prostate biopsy  04/11/2023    Prostatectomy  06/14/2023    Robotic simple prostatectomy N/A 6/14/2023    Performed by Forrest Mistry DO at  OR    Sinus surgery         Social History     Tobacco Use   Smoking  Status Every Day    Current packs/day: 0.75    Average packs/day: 0.7 packs/day for 44.9 years (33.7 ttl pk-yrs)    Types: Cigarettes    Start date:     Passive exposure: Past   Smokeless Tobacco Never     Social History     Tobacco Use    Smoking status: Every Day     Current packs/day: 0.75     Average packs/day: 0.7 packs/day for 44.9 years (33.7 ttl pk-yrs)     Types: Cigarettes     Start date:      Passive exposure: Past    Smokeless tobacco: Never   Vaping Use    Vaping status: Never Used   Substance Use Topics    Alcohol use: No    Drug use: No       Family History   Problem Relation Name Age of Onset    Other Biological Mother          natural causes -  at age 75    Hypertension Biological Father      Other Biological Father          natural causes -  at age 78    No Known Problems Biological Sister      Lung cancer Neg Hx         Allergies:  Nickel    Current Outpatient Medications   Medication Sig Dispense Refill    amLODIPine (NORVASC) 10 mg tablet take 1 tablet by mouth once daily 90 tablet 0    atorvastatin (LIPITOR) 20 mg tablet Take 1 tablet (20 mg total) by mouth nightly. 90 tablet 1    levothyroxine (SYNTHROID) 137 mcg tablet Take 1 tablet (137 mcg total) by mouth every morning. 90 tablet 3    lisinopriL-hydrochlorothiazide (PRINZIDE,ZESTORETIC) 20-12.5 mg per tablet Take 1 tablet by mouth daily. 90 tablet 1    metFORMIN (GLUCOPHAGE) 500 mg tablet take 1 tablet by mouth once daily 90 tablet 1     No current facility-administered medications for this visit.       Review of Systems   Cardiovascular:  Negative for chest pain, dyspnea on exertion, irregular heartbeat, leg swelling, palpitations and syncope.   Respiratory:  Negative for shortness of breath.    Hematologic/Lymphatic: Does not bruise/bleed easily.   Neurological:  Negative for dizziness and light-headedness.       Objective     Vitals:    24 1558   BP: (!) 138/90   Pulse: 61   SpO2: 98%       Wt Readings from Last 1  Encounters:   11/19/24 77.6 kg (171 lb)       Physical Exam  Vitals reviewed.   Constitutional:       General: He is not in acute distress.     Appearance: Normal appearance.   HENT:      Head: Normocephalic and atraumatic.   Eyes:      General: No scleral icterus.  Neck:      Vascular: No carotid bruit.   Cardiovascular:      Rate and Rhythm: Normal rate and regular rhythm.      Heart sounds: S1 normal and S2 normal. No murmur heard.     No S3 or S4 sounds.   Pulmonary:      Effort: Pulmonary effort is normal.      Breath sounds: Normal breath sounds.   Musculoskeletal:      Right lower leg: No edema.      Left lower leg: No edema.   Skin:     General: Skin is warm and dry.   Neurological:      General: No focal deficit present.      Mental Status: He is alert. Mental status is at baseline.   Psychiatric:         Mood and Affect: Mood normal.         Thought Content: Thought content normal.         Judgment: Judgment normal.       ECG   Normal sinus rhythm   Nonspecific ST abnormality   Abnormal ECG     Labs   Lab Results   Component Value Date    WBC 6.33 08/12/2024    HGB 13.7 08/12/2024    HCT 41.6 08/12/2024     08/12/2024    ALT 15 08/12/2024    AST 19 08/12/2024     08/12/2024    K 4.4 08/12/2024     08/12/2024    CREATININE 1.0 08/12/2024    BUN 16 08/12/2024    CO2 29 08/12/2024    PT 13.4 05/24/2023    GLUCOSE 110 (H) 08/12/2024    HGBA1C 6.3 (H) 08/12/2024         Estimated Functional Capacity  The patient has a Duke Activity Status Index score of 52.95, corresponding to an expected exertional capacity of 9.25 METS.          Assessment/Plan     Coronary artery calcification seen on CT scan  - He denies anginal symptoms and His EKG has no ischemic changes. He is tolerating current medical regimen without side effects.   - We will continue the same medications.       Ascending aorta dilatation (CMS/HCC)  TTE 2/8/24: 4.4 cm ascending aorta dilatation  - stable compared to study in 2019.    -BP management as noted under hypertension plan  - Continue to follow with periodic surveillance imaging.    Essential hypertension  - His blood pressure is acceptable in the office today, reasonably controlled with home readings.  - Continue same dose lisinopril/HCTZ, amlodipine.  - In addition to medications, he should maintain a low-sodium diet.  - He was advised to hold his lisinopril/HCTZ on the morning of his procedure.    Pure hypercholesterolemia  - His LDL was 111 when most recently checked.  He has been watching his diet and plans to have this rechecked by his PCP in the next couple of months.    Preop cardiovascular exam  Plan is for colonoscopy with Dr. Jaramillo  on 11/21/24   - Pt has no active cardiopulmonary symptoms  - Pt has adequate functional capacity, able to walk 2 blocks or climb a flight of stairs without cardiopulmonary limitation at recent baseline  - This procedure is not included in the NSQIP surgical database and so the Lopez and other modern non-cardiac surgical risk calculators are not applicable. However:   - In historic registries, these were found to be low risk procedures (<1% risk major adverse cardiac event [MACE] in all comers)  - Given low risk of MACE (<1%) and absence of acute cardiac decompensation; additional testing or intervention from our standpoint would not   - He was advised to hold his lisinopril-HCTZ the morning of his procedure.  Pt is an appropriate candidate from our perspective                  DONNA Alvarado  11/19/2024

## 2024-11-19 NOTE — TELEPHONE ENCOUNTER
L/m on v/m for patient to call us to schedule a 6 month follow up with Dr. Crain. Per Juana Henley NP.

## 2024-11-25 DIAGNOSIS — I10 ESSENTIAL HYPERTENSION: ICD-10-CM

## 2024-11-25 RX ORDER — AMLODIPINE BESYLATE 10 MG/1
10 TABLET ORAL DAILY
Qty: 90 TABLET | Refills: 1 | Status: SHIPPED | OUTPATIENT
Start: 2024-11-25 | End: 2025-03-31 | Stop reason: SDUPTHER

## 2024-11-25 NOTE — TELEPHONE ENCOUNTER
Medicine Refill Request    Last Office Visit: 9/30/2024   Last Consult Visit: Visit date not found  Last Telemedicine Visit: Visit date not found    Next Appointment: 3/31/2025      Current Outpatient Medications:     amLODIPine (NORVASC) 10 mg tablet, take 1 tablet by mouth once daily, Disp: 90 tablet, Rfl: 0    atorvastatin (LIPITOR) 20 mg tablet, Take 1 tablet (20 mg total) by mouth nightly., Disp: 90 tablet, Rfl: 1    levothyroxine (SYNTHROID) 137 mcg tablet, Take 1 tablet (137 mcg total) by mouth every morning., Disp: 90 tablet, Rfl: 3    lisinopriL-hydrochlorothiazide (PRINZIDE,ZESTORETIC) 20-12.5 mg per tablet, Take 1 tablet by mouth daily., Disp: 90 tablet, Rfl: 1    metFORMIN (GLUCOPHAGE) 500 mg tablet, take 1 tablet by mouth once daily, Disp: 90 tablet, Rfl: 1      BP Readings from Last 3 Encounters:   11/19/24 (!) 138/90   09/30/24 124/76   05/13/24 130/78       Recent Lab results:  Lab Results   Component Value Date    CHOL 169 08/12/2024   ,   Lab Results   Component Value Date    HDL 35 (L) 08/12/2024   ,   Lab Results   Component Value Date    LDLCALC 111 (H) 08/12/2024   ,   Lab Results   Component Value Date    TRIG 116 08/12/2024        Lab Results   Component Value Date    GLUCOSE 110 (H) 08/12/2024   ,   Lab Results   Component Value Date    HGBA1C 6.3 (H) 08/12/2024         Lab Results   Component Value Date    CREATININE 1.0 08/12/2024       Lab Results   Component Value Date    TSH 1.00 05/08/2024           Lab Results   Component Value Date    HGBA1C 6.3 (H) 08/12/2024

## 2025-01-08 DIAGNOSIS — R80.9 TYPE 2 DIABETES MELLITUS WITH DIABETIC MICROALBUMINURIA, WITHOUT LONG-TERM CURRENT USE OF INSULIN (CMS/HCC): ICD-10-CM

## 2025-01-08 DIAGNOSIS — E11.29 TYPE 2 DIABETES MELLITUS WITH DIABETIC MICROALBUMINURIA, WITHOUT LONG-TERM CURRENT USE OF INSULIN (CMS/HCC): ICD-10-CM

## 2025-01-08 RX ORDER — METFORMIN HYDROCHLORIDE 500 MG/1
500 TABLET ORAL DAILY
Qty: 90 TABLET | Refills: 1 | Status: SHIPPED | OUTPATIENT
Start: 2025-01-08

## 2025-01-08 NOTE — TELEPHONE ENCOUNTER
Medicine Refill Request    Last Office Visit: 9/30/2024   Last Consult Visit: Visit date not found  Last Telemedicine Visit: Visit date not found    Next Appointment: 3/31/2025      Current Outpatient Medications:     amLODIPine (NORVASC) 10 mg tablet, take 1 tablet by mouth once daily, Disp: 90 tablet, Rfl: 1    atorvastatin (LIPITOR) 20 mg tablet, Take 1 tablet (20 mg total) by mouth nightly., Disp: 90 tablet, Rfl: 1    levothyroxine (SYNTHROID) 137 mcg tablet, Take 1 tablet (137 mcg total) by mouth every morning., Disp: 90 tablet, Rfl: 3    lisinopriL-hydrochlorothiazide (PRINZIDE,ZESTORETIC) 20-12.5 mg per tablet, Take 1 tablet by mouth daily., Disp: 90 tablet, Rfl: 1    metFORMIN (GLUCOPHAGE) 500 mg tablet, take 1 tablet by mouth once daily, Disp: 90 tablet, Rfl: 1      BP Readings from Last 3 Encounters:   11/19/24 (!) 138/90   09/30/24 124/76   05/13/24 130/78       Recent Lab results:  Lab Results   Component Value Date    CHOL 169 08/12/2024   ,   Lab Results   Component Value Date    HDL 35 (L) 08/12/2024   ,   Lab Results   Component Value Date    LDLCALC 111 (H) 08/12/2024   ,   Lab Results   Component Value Date    TRIG 116 08/12/2024        Lab Results   Component Value Date    GLUCOSE 110 (H) 08/12/2024   ,   Lab Results   Component Value Date    HGBA1C 6.3 (H) 08/12/2024         Lab Results   Component Value Date    CREATININE 1.0 08/12/2024       Lab Results   Component Value Date    TSH 1.00 05/08/2024           Lab Results   Component Value Date    HGBA1C 6.3 (H) 08/12/2024

## 2025-03-22 ENCOUNTER — APPOINTMENT (OUTPATIENT)
Dept: LAB | Age: 70
End: 2025-03-22
Attending: FAMILY MEDICINE
Payer: COMMERCIAL

## 2025-03-22 DIAGNOSIS — R80.9 TYPE 2 DIABETES MELLITUS WITH DIABETIC MICROALBUMINURIA, WITHOUT LONG-TERM CURRENT USE OF INSULIN (CMS/HCC): ICD-10-CM

## 2025-03-22 DIAGNOSIS — E78.00 PURE HYPERCHOLESTEROLEMIA: ICD-10-CM

## 2025-03-22 DIAGNOSIS — E11.29 TYPE 2 DIABETES MELLITUS WITH DIABETIC MICROALBUMINURIA, WITHOUT LONG-TERM CURRENT USE OF INSULIN (CMS/HCC): ICD-10-CM

## 2025-03-22 DIAGNOSIS — I10 ESSENTIAL HYPERTENSION: ICD-10-CM

## 2025-03-22 LAB
ANION GAP SERPL CALC-SCNC: 9 MEQ/L (ref 3–15)
BUN SERPL-MCNC: 20 MG/DL (ref 7–25)
CALCIUM SERPL-MCNC: 9.4 MG/DL (ref 8.6–10.3)
CHLORIDE SERPL-SCNC: 105 MEQ/L (ref 98–107)
CHOLEST SERPL-MCNC: 163 MG/DL
CO2 SERPL-SCNC: 27 MEQ/L (ref 21–31)
CREAT SERPL-MCNC: 0.8 MG/DL (ref 0.7–1.3)
CREAT UR-MCNC: 99.5 MG/DL
EGFRCR SERPLBLD CKD-EPI 2021: >60 ML/MIN/1.73M*2
EST. AVERAGE GLUCOSE BLD GHB EST-MCNC: 128 MG/DL
GLUCOSE SERPL-MCNC: 92 MG/DL (ref 70–99)
HBA1C MFR BLD: 6.1 %
HDLC SERPL-MCNC: 35 MG/DL
HDLC SERPL: 4.7 {RATIO}
LDLC SERPL CALC-MCNC: 107 MG/DL
MICROALBUMIN UR-MCNC: 284.6 MG/L
MICROALBUMIN/CREAT UR: 286 UG/MG
NONHDLC SERPL-MCNC: 128 MG/DL
POTASSIUM SERPL-SCNC: 4.5 MEQ/L (ref 3.5–5.1)
SODIUM SERPL-SCNC: 141 MEQ/L (ref 136–145)
TRIGL SERPL-MCNC: 103 MG/DL
TSH SERPL DL<=0.05 MIU/L-ACNC: 2.13 MIU/L (ref 0.34–5.6)

## 2025-03-22 PROCEDURE — 82570 ASSAY OF URINE CREATININE: CPT

## 2025-03-22 PROCEDURE — 83036 HEMOGLOBIN GLYCOSYLATED A1C: CPT

## 2025-03-22 PROCEDURE — 84443 ASSAY THYROID STIM HORMONE: CPT

## 2025-03-22 PROCEDURE — 36415 COLL VENOUS BLD VENIPUNCTURE: CPT

## 2025-03-22 PROCEDURE — 80048 BASIC METABOLIC PNL TOTAL CA: CPT

## 2025-03-22 PROCEDURE — 80061 LIPID PANEL: CPT

## 2025-03-30 PROBLEM — D64.9 NORMOCYTIC ANEMIA: Status: RESOLVED | Noted: 2023-09-20 | Resolved: 2025-03-30

## 2025-03-30 PROBLEM — B96.81 HELICOBACTER PYLORI GASTRITIS: Status: ACTIVE | Noted: 2025-03-30

## 2025-03-30 PROBLEM — Z01.810 PREOP CARDIOVASCULAR EXAM: Status: RESOLVED | Noted: 2023-05-24 | Resolved: 2025-03-30

## 2025-03-30 PROBLEM — K29.70 HELICOBACTER PYLORI GASTRITIS: Status: ACTIVE | Noted: 2025-03-30

## 2025-03-31 ENCOUNTER — HOSPITAL ENCOUNTER (OUTPATIENT)
Dept: RADIOLOGY | Age: 70
Discharge: HOME | End: 2025-03-31
Attending: FAMILY MEDICINE
Payer: COMMERCIAL

## 2025-03-31 ENCOUNTER — OFFICE VISIT (OUTPATIENT)
Dept: PRIMARY CARE | Facility: CLINIC | Age: 70
End: 2025-03-31
Payer: COMMERCIAL

## 2025-03-31 VITALS
SYSTOLIC BLOOD PRESSURE: 150 MMHG | WEIGHT: 174 LBS | OXYGEN SATURATION: 98 % | HEART RATE: 79 BPM | TEMPERATURE: 98.7 F | HEIGHT: 69 IN | BODY MASS INDEX: 25.77 KG/M2 | DIASTOLIC BLOOD PRESSURE: 70 MMHG | RESPIRATION RATE: 16 BRPM

## 2025-03-31 DIAGNOSIS — E11.29 TYPE 2 DIABETES MELLITUS WITH DIABETIC MICROALBUMINURIA, WITHOUT LONG-TERM CURRENT USE OF INSULIN (CMS/HCC): Primary | ICD-10-CM

## 2025-03-31 DIAGNOSIS — B96.81 HELICOBACTER PYLORI GASTRITIS: ICD-10-CM

## 2025-03-31 DIAGNOSIS — I25.10 CORONARY ARTERY CALCIFICATION SEEN ON CT SCAN: ICD-10-CM

## 2025-03-31 DIAGNOSIS — K29.70 HELICOBACTER PYLORI GASTRITIS: ICD-10-CM

## 2025-03-31 DIAGNOSIS — E78.00 PURE HYPERCHOLESTEROLEMIA: ICD-10-CM

## 2025-03-31 DIAGNOSIS — K63.5 POLYP OF COLON, UNSPECIFIED PART OF COLON, UNSPECIFIED TYPE: ICD-10-CM

## 2025-03-31 DIAGNOSIS — E89.0 POSTABLATIVE HYPOTHYROIDISM: ICD-10-CM

## 2025-03-31 DIAGNOSIS — R80.9 TYPE 2 DIABETES MELLITUS WITH DIABETIC MICROALBUMINURIA, WITHOUT LONG-TERM CURRENT USE OF INSULIN (CMS/HCC): Primary | ICD-10-CM

## 2025-03-31 DIAGNOSIS — I77.811 AORTIC ECTASIA, ABDOMINAL (CMS/HCC): ICD-10-CM

## 2025-03-31 DIAGNOSIS — I10 ESSENTIAL HYPERTENSION: ICD-10-CM

## 2025-03-31 PROCEDURE — 3077F SYST BP >= 140 MM HG: CPT | Performed by: FAMILY MEDICINE

## 2025-03-31 PROCEDURE — 3008F BODY MASS INDEX DOCD: CPT | Performed by: FAMILY MEDICINE

## 2025-03-31 PROCEDURE — 76775 US EXAM ABDO BACK WALL LIM: CPT

## 2025-03-31 PROCEDURE — 99214 OFFICE O/P EST MOD 30 MIN: CPT | Performed by: FAMILY MEDICINE

## 2025-03-31 PROCEDURE — 3078F DIAST BP <80 MM HG: CPT | Performed by: FAMILY MEDICINE

## 2025-03-31 RX ORDER — AMLODIPINE BESYLATE 10 MG/1
10 TABLET ORAL DAILY
Qty: 90 TABLET | Refills: 1 | Status: SHIPPED | OUTPATIENT
Start: 2025-03-31 | End: 2025-05-09 | Stop reason: SDUPTHER

## 2025-03-31 RX ORDER — ROSUVASTATIN CALCIUM 20 MG/1
20 TABLET, COATED ORAL DAILY
Qty: 90 TABLET | Refills: 1 | Status: SHIPPED | OUTPATIENT
Start: 2025-03-31 | End: 2025-05-09 | Stop reason: SDUPTHER

## 2025-03-31 RX ORDER — LISINOPRIL AND HYDROCHLOROTHIAZIDE 12.5; 2 MG/1; MG/1
1 TABLET ORAL DAILY
Qty: 90 TABLET | Refills: 1 | Status: SHIPPED | OUTPATIENT
Start: 2025-03-31 | End: 2025-05-29

## 2025-03-31 ASSESSMENT — ENCOUNTER SYMPTOMS
SHORTNESS OF BREATH: 0
UNEXPECTED WEIGHT CHANGE: 0
NAUSEA: 0
PALPITATIONS: 0
CONSTIPATION: 0
LIGHT-HEADEDNESS: 0
FATIGUE: 0
VOMITING: 0
ANAL BLEEDING: 0
DIARRHEA: 0
CHEST TIGHTNESS: 0
BLOOD IN STOOL: 0
DIZZINESS: 0
ABDOMINAL PAIN: 0

## 2025-03-31 NOTE — ASSESSMENT & PLAN NOTE
Our goal LDL for him will be close to 70, but certainly less than 100.  Orders:    Lipid panel; Future

## 2025-03-31 NOTE — PROGRESS NOTES
"Subjective      Patient ID: Rhys Campos is a 69 y.o. male.  1955      Rhys is here for a follow-up of his labs and blood pressure    DM  -His A1c has improved from 6.3 down to 6.1%.  His microalbumin has increased from 36 up to 286.  BMP normal.  -Eye Dr: He last saw January 2024 and he has an appointment scheduled the summer. No changes in vision     Hyperlipidemia-his LDL improved from 111 down to 107.  His ultrasound showed stability with no evidence of AAA.  - no dizziness, lh, cp, or sob.   -avoids fast foods. Has cut back ice cream. Fat free creamer in coffee.     His TSH is 2.13- normal energy    His colonoscopy was done November 2024 and a 1 year follow-up was recommended since he had 7 tubular adenomas. No bowel changes  His upper endoscopy showed H. pylori gastritis-he reports that he was treated with a 2-week course of medication. No ascending acid now. No brbpr or melena. No abd pain or swallowing issues. No n/v.         The following have been reviewed and updated as appropriate in this visit:   Meds       Review of Systems   Constitutional:  Negative for fatigue and unexpected weight change.   Eyes:  Negative for visual disturbance.   Respiratory:  Negative for chest tightness and shortness of breath.    Cardiovascular:  Negative for chest pain, palpitations and leg swelling.   Gastrointestinal:  Negative for abdominal pain, anal bleeding, blood in stool, constipation, diarrhea, nausea and vomiting.   Neurological:  Negative for dizziness, syncope and light-headedness.       Objective     Vitals:    03/31/25 1458 03/31/25 1523   BP: (!) 168/70 (!) 150/70   BP Location: Left upper arm Left upper arm   Patient Position: Sitting Sitting   Pulse: 79    Resp: 16    Temp: 37.1 °C (98.7 °F)    TempSrc: Temporal    SpO2: 98%    Weight: 78.9 kg (174 lb)    Height: 1.753 m (5' 9.02\")      Body mass index is 25.68 kg/m².    Physical Exam  Constitutional:       General: He is not in acute distress.     " Appearance: Normal appearance. He is normal weight. He is not ill-appearing, toxic-appearing or diaphoretic.   HENT:      Head: Normocephalic and atraumatic.   Cardiovascular:      Rate and Rhythm: Normal rate and regular rhythm.      Heart sounds: Murmur (2/6 perfecto heard best RUSB) heard.      No friction rub. No gallop.      Comments: Regular rate and rhythm at 84 bpm's with occasional single asx ectopic beat noted.  Pulmonary:      Effort: Pulmonary effort is normal. No respiratory distress.      Breath sounds: Normal breath sounds. No stridor. No wheezing, rhonchi or rales.   Musculoskeletal:      Right lower leg: No edema.      Left lower leg: No edema.   Neurological:      Mental Status: He is alert and oriented to person, place, and time. Mental status is at baseline.         Assessment & Plan  Type 2 diabetes mellitus with diabetic microalbuminuria, without long-term current use of insulin (CMS/[facility])  His A1c actually looks quite good at 6.1%.  If it remains this good in 3 months, we will discontinue his metformin.  He has done a good job cutting back on sweets.  He will see his eye doctor in the next several months as planned.  Orders:    Hemoglobin A1c; Future    CBC and Differential; Future    Pure hypercholesterolemia  His LDL remains slightly elevated despite his dietary improvements.  He agrees to increase his statin intensity by switching from atorvastatin 20 mg over to rosuvastatin 20 mg and he will repeat his labs in approximately 3 months.  He will keep up the great work from a dietary perspective  Orders:    Comprehensive metabolic panel; Future    Lipid panel; Future    Essential hypertension  His home blood pressure was 125/71 at work.  We know that he has elevated blood pressures here at the office, but he continues to feel very well.  BP elevated today as expected, but he reports his home values are excellent in the 120s over 70s.  He will get this checked at work again through the nurse there  and send me his readings in 1 to 2 weeks.  I can share that with his cardiologist.  Orders:    CBC and Differential; Future    lisinopriL-hydrochlorothiazide (PRINZIDE,ZESTORETIC) 20-12.5 mg per tablet; Take 1 tablet by mouth daily.    amLODIPine (NORVASC) 10 mg tablet; Take 1 tablet (10 mg total) by mouth daily.    Coronary artery calcification seen on CT scan  Our goal LDL for him will be close to 70, but certainly less than 100.  Orders:    Lipid panel; Future    Postablative hypothyroidism  His TSH is excellent so continue current dosage of levothyroxine       Helicobacter pylori gastritis  Status posttreatment by the GI doctor.  No symptoms at all       Polyp of colon, unspecified part of colon, unspecified type  He knows he needs to repeat his colonoscopy in November       Aortic ectasia, abdominal (CMS/HCC)  His aortic diameter has been stable at 2.5 cm dating back to 2020.  We will repeat this in 3 years given the stability over time.  No aneurysm seen.

## 2025-03-31 NOTE — ASSESSMENT & PLAN NOTE
His home blood pressure was 125/71 at work.  We know that he has elevated blood pressures here at the office, but he continues to feel very well.  BP elevated today as expected, but he reports his home values are excellent in the 120s over 70s.  He will get this checked at work again through the nurse there and send me his readings in 1 to 2 weeks.  I can share that with his cardiologist.  Orders:    CBC and Differential; Future    lisinopriL-hydrochlorothiazide (PRINZIDE,ZESTORETIC) 20-12.5 mg per tablet; Take 1 tablet by mouth daily.    amLODIPine (NORVASC) 10 mg tablet; Take 1 tablet (10 mg total) by mouth daily.

## 2025-03-31 NOTE — ASSESSMENT & PLAN NOTE
His A1c actually looks quite good at 6.1%.  If it remains this good in 3 months, we will discontinue his metformin.  He has done a good job cutting back on sweets.  He will see his eye doctor in the next several months as planned.  Orders:    Hemoglobin A1c; Future    CBC and Differential; Future

## 2025-03-31 NOTE — ASSESSMENT & PLAN NOTE
His aortic diameter has been stable at 2.5 cm dating back to 2020.  We will repeat this in 3 years given the stability over time.  No aneurysm seen.

## 2025-03-31 NOTE — ASSESSMENT & PLAN NOTE
His LDL remains slightly elevated despite his dietary improvements.  He agrees to increase his statin intensity by switching from atorvastatin 20 mg over to rosuvastatin 20 mg and he will repeat his labs in approximately 3 months.  He will keep up the great work from a dietary perspective  Orders:    Comprehensive metabolic panel; Future    Lipid panel; Future

## 2025-05-09 DIAGNOSIS — E03.9 HYPOTHYROIDISM, UNSPECIFIED TYPE: ICD-10-CM

## 2025-05-09 DIAGNOSIS — E11.29 TYPE 2 DIABETES MELLITUS WITH DIABETIC MICROALBUMINURIA, WITHOUT LONG-TERM CURRENT USE OF INSULIN (CMS/HCC): ICD-10-CM

## 2025-05-09 DIAGNOSIS — I10 ESSENTIAL HYPERTENSION: ICD-10-CM

## 2025-05-09 DIAGNOSIS — R80.9 TYPE 2 DIABETES MELLITUS WITH DIABETIC MICROALBUMINURIA, WITHOUT LONG-TERM CURRENT USE OF INSULIN (CMS/HCC): ICD-10-CM

## 2025-05-09 RX ORDER — LEVOTHYROXINE SODIUM 137 UG/1
137 TABLET ORAL EVERY MORNING
Qty: 90 TABLET | Refills: 3 | Status: SHIPPED | OUTPATIENT
Start: 2025-05-09 | End: 2025-05-14 | Stop reason: SDUPTHER

## 2025-05-09 RX ORDER — METFORMIN HYDROCHLORIDE 500 MG/1
500 TABLET ORAL DAILY
Qty: 90 TABLET | Refills: 1 | Status: SHIPPED | OUTPATIENT
Start: 2025-05-09 | End: 2025-05-14 | Stop reason: SDUPTHER

## 2025-05-09 RX ORDER — ROSUVASTATIN CALCIUM 20 MG/1
20 TABLET, COATED ORAL DAILY
Qty: 90 TABLET | Refills: 0 | Status: SHIPPED | OUTPATIENT
Start: 2025-05-09 | End: 2025-05-14 | Stop reason: SDUPTHER

## 2025-05-09 RX ORDER — AMLODIPINE BESYLATE 10 MG/1
10 TABLET ORAL DAILY
Qty: 90 TABLET | Refills: 1 | Status: SHIPPED | OUTPATIENT
Start: 2025-05-09 | End: 2025-05-23 | Stop reason: SDUPTHER

## 2025-05-14 DIAGNOSIS — E11.29 TYPE 2 DIABETES MELLITUS WITH DIABETIC MICROALBUMINURIA, WITHOUT LONG-TERM CURRENT USE OF INSULIN (CMS/HCC): ICD-10-CM

## 2025-05-14 DIAGNOSIS — R80.9 TYPE 2 DIABETES MELLITUS WITH DIABETIC MICROALBUMINURIA, WITHOUT LONG-TERM CURRENT USE OF INSULIN (CMS/HCC): ICD-10-CM

## 2025-05-14 DIAGNOSIS — E03.9 HYPOTHYROIDISM, UNSPECIFIED TYPE: ICD-10-CM

## 2025-05-14 RX ORDER — METFORMIN HYDROCHLORIDE 500 MG/1
500 TABLET ORAL DAILY
Qty: 90 TABLET | Refills: 1 | Status: SHIPPED | OUTPATIENT
Start: 2025-05-14

## 2025-05-14 RX ORDER — LEVOTHYROXINE SODIUM 137 UG/1
137 TABLET ORAL EVERY MORNING
Qty: 90 TABLET | Refills: 1 | Status: SHIPPED | OUTPATIENT
Start: 2025-05-14 | End: 2025-05-29

## 2025-05-14 RX ORDER — ROSUVASTATIN CALCIUM 20 MG/1
20 TABLET, COATED ORAL DAILY
Qty: 90 TABLET | Refills: 0 | Status: SHIPPED | OUTPATIENT
Start: 2025-05-14 | End: 2025-11-10

## 2025-05-23 DIAGNOSIS — I10 ESSENTIAL HYPERTENSION: ICD-10-CM

## 2025-05-23 RX ORDER — AMLODIPINE BESYLATE 10 MG/1
10 TABLET ORAL DAILY
Qty: 90 TABLET | Refills: 1 | Status: SHIPPED | OUTPATIENT
Start: 2025-05-23

## 2025-05-24 ENCOUNTER — APPOINTMENT (OUTPATIENT)
Dept: LAB | Age: 70
End: 2025-05-24
Attending: INTERNAL MEDICINE
Payer: COMMERCIAL

## 2025-05-24 ENCOUNTER — TRANSCRIBE ORDERS (OUTPATIENT)
Dept: LAB | Age: 70
End: 2025-05-24

## 2025-05-24 DIAGNOSIS — R80.9 PROTEINURIA: Primary | ICD-10-CM

## 2025-05-24 DIAGNOSIS — R80.9 PROTEINURIA: ICD-10-CM

## 2025-05-24 LAB
ANION GAP SERPL CALC-SCNC: 8 MEQ/L (ref 3–15)
BUN SERPL-MCNC: 12 MG/DL (ref 7–25)
CALCIUM SERPL-MCNC: 9.3 MG/DL (ref 8.6–10.3)
CHLORIDE SERPL-SCNC: 102 MEQ/L (ref 98–107)
CO2 SERPL-SCNC: 28 MEQ/L (ref 21–31)
CREAT SERPL-MCNC: 1 MG/DL (ref 0.7–1.3)
CREAT UR-MCNC: 58.8 MG/DL
EGFRCR SERPLBLD CKD-EPI 2021: >60 ML/MIN/1.73M*2
GLUCOSE SERPL-MCNC: 227 MG/DL (ref 70–99)
MICROALBUMIN UR-MCNC: 100.1 MG/L
MICROALBUMIN/CREAT UR: 170.2 UG/MG
POTASSIUM SERPL-SCNC: 4.1 MEQ/L (ref 3.5–5.1)
SODIUM SERPL-SCNC: 138 MEQ/L (ref 136–145)

## 2025-05-24 PROCEDURE — 82043 UR ALBUMIN QUANTITATIVE: CPT

## 2025-05-24 PROCEDURE — 36415 COLL VENOUS BLD VENIPUNCTURE: CPT

## 2025-05-24 PROCEDURE — 80048 BASIC METABOLIC PNL TOTAL CA: CPT

## 2025-05-27 ENCOUNTER — TRANSCRIBE ORDERS (OUTPATIENT)
Dept: LAB | Age: 70
End: 2025-05-27

## 2025-05-27 ENCOUNTER — APPOINTMENT (OUTPATIENT)
Dept: LAB | Age: 70
End: 2025-05-27
Attending: FAMILY MEDICINE
Payer: COMMERCIAL

## 2025-05-27 DIAGNOSIS — I25.10 CORONARY ARTERY CALCIFICATION SEEN ON CT SCAN: ICD-10-CM

## 2025-05-27 DIAGNOSIS — E78.00 PURE HYPERCHOLESTEROLEMIA: ICD-10-CM

## 2025-05-27 DIAGNOSIS — I10 ESSENTIAL HYPERTENSION: ICD-10-CM

## 2025-05-27 DIAGNOSIS — M10.9 GOUT, UNSPECIFIED: Primary | ICD-10-CM

## 2025-05-27 DIAGNOSIS — E11.29 TYPE 2 DIABETES MELLITUS WITH DIABETIC MICROALBUMINURIA, WITHOUT LONG-TERM CURRENT USE OF INSULIN (CMS/HCC): ICD-10-CM

## 2025-05-27 DIAGNOSIS — R80.9 TYPE 2 DIABETES MELLITUS WITH DIABETIC MICROALBUMINURIA, WITHOUT LONG-TERM CURRENT USE OF INSULIN (CMS/HCC): ICD-10-CM

## 2025-05-27 LAB
ALBUMIN SERPL-MCNC: 4.3 G/DL (ref 3.5–5.7)
ALP SERPL-CCNC: 52 IU/L (ref 34–125)
ALT SERPL-CCNC: 14 IU/L (ref 7–52)
ANION GAP SERPL CALC-SCNC: 10 MEQ/L (ref 3–15)
AST SERPL-CCNC: 17 IU/L (ref 13–39)
BASOPHILS # BLD: 0.05 K/UL (ref 0.01–0.1)
BASOPHILS NFR BLD: 0.8 %
BILIRUB SERPL-MCNC: 0.6 MG/DL (ref 0.3–1.2)
BUN SERPL-MCNC: 18 MG/DL (ref 7–25)
CALCIUM SERPL-MCNC: 9.6 MG/DL (ref 8.6–10.3)
CHLORIDE SERPL-SCNC: 102 MEQ/L (ref 98–107)
CHOLEST SERPL-MCNC: 182 MG/DL
CO2 SERPL-SCNC: 28 MEQ/L (ref 21–31)
CREAT SERPL-MCNC: 1 MG/DL (ref 0.7–1.3)
DIFFERENTIAL METHOD BLD: ABNORMAL
EGFRCR SERPLBLD CKD-EPI 2021: >60 ML/MIN/1.73M*2
EOSINOPHIL # BLD: 0.26 K/UL (ref 0.04–0.54)
EOSINOPHIL NFR BLD: 4 %
ERYTHROCYTE [DISTWIDTH] IN BLOOD BY AUTOMATED COUNT: 12.8 % (ref 11.6–14.4)
EST. AVERAGE GLUCOSE BLD GHB EST-MCNC: 131 MG/DL
GLUCOSE SERPL-MCNC: 97 MG/DL (ref 70–99)
HBA1C MFR BLD: 6.2 %
HCT VFR BLD AUTO: 42 % (ref 40.1–51)
HDLC SERPL-MCNC: 37 MG/DL
HDLC SERPL: 4.9 {RATIO}
HGB BLD-MCNC: 13.9 G/DL (ref 13.7–17.5)
IMM GRANULOCYTES # BLD AUTO: 0.02 K/UL (ref 0–0.08)
IMM GRANULOCYTES NFR BLD AUTO: 0.3 %
LDLC SERPL CALC-MCNC: 117 MG/DL
LYMPHOCYTES # BLD: 2.03 K/UL (ref 1.2–3.5)
LYMPHOCYTES NFR BLD: 31.5 %
MCH RBC QN AUTO: 30.1 PG (ref 28–33.2)
MCHC RBC AUTO-ENTMCNC: 33.1 G/DL (ref 32.2–36.5)
MCV RBC AUTO: 90.9 FL (ref 83–98)
MONOCYTES # BLD: 0.53 K/UL (ref 0.3–1)
MONOCYTES NFR BLD: 8.2 %
NEUTROPHILS # BLD: 3.55 K/UL (ref 1.7–7)
NEUTS SEG NFR BLD: 55.2 %
NONHDLC SERPL-MCNC: 145 MG/DL
NRBC BLD-RTO: 0 %
PLATELET # BLD AUTO: 158 K/UL (ref 150–350)
PMV BLD AUTO: 13.1 FL (ref 9.4–12.4)
POTASSIUM SERPL-SCNC: 4.4 MEQ/L (ref 3.5–5.1)
PROT SERPL-MCNC: 7.7 G/DL (ref 6–8.2)
RBC # BLD AUTO: 4.62 M/UL (ref 4.5–5.8)
SODIUM SERPL-SCNC: 140 MEQ/L (ref 136–145)
TRIGL SERPL-MCNC: 142 MG/DL
WBC # BLD AUTO: 6.44 K/UL (ref 3.8–10.5)

## 2025-05-27 PROCEDURE — 83036 HEMOGLOBIN GLYCOSYLATED A1C: CPT

## 2025-05-27 PROCEDURE — 80053 COMPREHEN METABOLIC PANEL: CPT

## 2025-05-27 PROCEDURE — 36415 COLL VENOUS BLD VENIPUNCTURE: CPT

## 2025-05-27 PROCEDURE — 80061 LIPID PANEL: CPT

## 2025-05-27 PROCEDURE — 85025 COMPLETE CBC W/AUTO DIFF WBC: CPT

## 2025-05-28 ENCOUNTER — TELEPHONE (OUTPATIENT)
Dept: PRIMARY CARE | Facility: CLINIC | Age: 70
End: 2025-05-28
Payer: COMMERCIAL

## 2025-05-28 DIAGNOSIS — E11.29 TYPE 2 DIABETES MELLITUS WITH DIABETIC MICROALBUMINURIA, WITHOUT LONG-TERM CURRENT USE OF INSULIN (CMS/HCC): ICD-10-CM

## 2025-05-28 DIAGNOSIS — E89.0 POSTABLATIVE HYPOTHYROIDISM: ICD-10-CM

## 2025-05-28 DIAGNOSIS — I25.10 CORONARY ARTERY CALCIFICATION SEEN ON CT SCAN: Primary | ICD-10-CM

## 2025-05-28 DIAGNOSIS — R80.9 TYPE 2 DIABETES MELLITUS WITH DIABETIC MICROALBUMINURIA, WITHOUT LONG-TERM CURRENT USE OF INSULIN (CMS/HCC): ICD-10-CM

## 2025-05-29 ENCOUNTER — OFFICE VISIT (OUTPATIENT)
Dept: CARDIOLOGY | Facility: CLINIC | Age: 70
End: 2025-05-29
Payer: COMMERCIAL

## 2025-05-29 VITALS
DIASTOLIC BLOOD PRESSURE: 76 MMHG | BODY MASS INDEX: 25.18 KG/M2 | SYSTOLIC BLOOD PRESSURE: 134 MMHG | WEIGHT: 170 LBS | OXYGEN SATURATION: 98 % | HEIGHT: 69 IN | HEART RATE: 74 BPM

## 2025-05-29 DIAGNOSIS — E78.00 PURE HYPERCHOLESTEROLEMIA: ICD-10-CM

## 2025-05-29 DIAGNOSIS — I77.810 ASCENDING AORTA DILATATION (CMS/HCC): ICD-10-CM

## 2025-05-29 DIAGNOSIS — I10 ESSENTIAL HYPERTENSION: ICD-10-CM

## 2025-05-29 DIAGNOSIS — I25.10 CORONARY ARTERY CALCIFICATION SEEN ON CT SCAN: Primary | ICD-10-CM

## 2025-05-29 DIAGNOSIS — I45.10 RBBB: ICD-10-CM

## 2025-05-29 LAB
ATRIAL RATE: 74
P AXIS: 57
PR INTERVAL: 128
QRS DURATION: 128
QT INTERVAL: 418
QTC CALCULATION(BAZETT): 463
R AXIS: -37
T WAVE AXIS: -24
VENTRICULAR RATE: 74

## 2025-05-29 PROCEDURE — 99214 OFFICE O/P EST MOD 30 MIN: CPT | Performed by: INTERNAL MEDICINE

## 2025-05-29 PROCEDURE — 93000 ELECTROCARDIOGRAM COMPLETE: CPT | Performed by: INTERNAL MEDICINE

## 2025-05-29 PROCEDURE — 3075F SYST BP GE 130 - 139MM HG: CPT | Performed by: INTERNAL MEDICINE

## 2025-05-29 PROCEDURE — 3078F DIAST BP <80 MM HG: CPT | Performed by: INTERNAL MEDICINE

## 2025-05-29 PROCEDURE — 3008F BODY MASS INDEX DOCD: CPT | Performed by: INTERNAL MEDICINE

## 2025-05-29 RX ORDER — LISINOPRIL 40 MG/1
40 TABLET ORAL DAILY
COMMUNITY
Start: 2025-05-01

## 2025-05-29 RX ORDER — LEVOTHYROXINE SODIUM 125 UG/1
125 TABLET ORAL
COMMUNITY
End: 2025-06-02 | Stop reason: SDUPTHER

## 2025-06-02 RX ORDER — LEVOTHYROXINE SODIUM 125 UG/1
125 TABLET ORAL
Qty: 90 TABLET | Refills: 3 | Status: SHIPPED | OUTPATIENT
Start: 2025-06-02

## 2025-06-02 NOTE — TELEPHONE ENCOUNTER
Medicine Refill Request    Last Office Visit: 3/31/2025   Last Consult Visit: Visit date not found  Last Telemedicine Visit: Visit date not found    Next Appointment: 9/30/2025      Current Outpatient Medications:     amLODIPine (NORVASC) 10 mg tablet, Take 1 tablet (10 mg total) by mouth daily., Disp: 90 tablet, Rfl: 1    levothyroxine (SYNTHROID) 125 mcg tablet, Take 125 mcg by mouth daily., Disp: , Rfl:     lisinopriL (PRINIVIL) 40 mg tablet, Take 40 mg by mouth daily., Disp: , Rfl:     metFORMIN (GLUCOPHAGE) 500 mg tablet, Take 1 tablet (500 mg total) by mouth daily., Disp: 90 tablet, Rfl: 1    rosuvastatin (CRESTOR) 20 mg tablet, Take 1 tablet (20 mg total) by mouth daily., Disp: 90 tablet, Rfl: 0    BP Readings from Last 3 Encounters:   05/29/25 134/76   03/31/25 (!) 150/70   11/19/24 (!) 138/90       Recent Lab results:  Lab Results   Component Value Date    CHOL 182 05/27/2025   ,   Lab Results   Component Value Date    HDL 37 (L) 05/27/2025   ,   Lab Results   Component Value Date    LDLCALC 117 (H) 05/27/2025   ,   Lab Results   Component Value Date    TRIG 142 05/27/2025        Lab Results   Component Value Date    GLUCOSE 97 05/27/2025   ,   Lab Results   Component Value Date    HGBA1C 6.2 (H) 05/27/2025         Lab Results   Component Value Date    CREATININE 1.0 05/27/2025       Lab Results   Component Value Date    TSH 2.13 03/22/2025           Lab Results   Component Value Date    HGBA1C 6.2 (H) 05/27/2025

## 2025-06-03 ENCOUNTER — TELEPHONE (OUTPATIENT)
Dept: CARDIOLOGY | Facility: CLINIC | Age: 70
End: 2025-06-03
Payer: COMMERCIAL

## 2025-06-03 NOTE — TELEPHONE ENCOUNTER
[facility] Refill Request      MA Notes:      Nurse Notes:      Last Office Visit: 5/29/2025  Last Telemedicine Visit: Visit date not found    Next Office Visit: Visit date not found  Next Telemedicine Visit: Visit date not found     Most Recent BP Readings:  BP Readings from Last 3 Encounters:   05/29/25 134/76   03/31/25 (!) 150/70   11/19/24 (!) 138/90       Most recent Lab results:  Lab Results   Component Value Date    CHOL 182 05/27/2025    HDL 37 (L) 05/27/2025    TRIG 142 05/27/2025    NONHDLCALC 145 05/27/2025       Lab Results   Component Value Date    AST 17 05/27/2025    ALT 14 05/27/2025       Lab Results   Component Value Date     05/27/2025    K 4.4 05/27/2025    BUN 18 05/27/2025    CREATININE 1.0 05/27/2025       Current Medications:    Current Outpatient Medications:     amLODIPine (NORVASC) 10 mg tablet, Take 1 tablet (10 mg total) by mouth daily., Disp: 90 tablet, Rfl: 1    levothyroxine (SYNTHROID) 125 mcg tablet, Take 1 tablet (125 mcg total) by mouth daily., Disp: 90 tablet, Rfl: 3    lisinopriL (PRINIVIL) 40 mg tablet, Take 40 mg by mouth daily., Disp: , Rfl:     metFORMIN (GLUCOPHAGE) 500 mg tablet, Take 1 tablet (500 mg total) by mouth daily., Disp: 90 tablet, Rfl: 1    rosuvastatin (CRESTOR) 20 mg tablet, Take 1 tablet (20 mg total) by mouth daily., Disp: 90 tablet, Rfl: 0

## 2025-08-04 ENCOUNTER — TELEPHONE (OUTPATIENT)
Dept: PULMONOLOGY | Facility: HOSPITAL | Age: 70
End: 2025-08-04
Payer: COMMERCIAL

## 2025-08-04 VITALS — BODY MASS INDEX: 25.18 KG/M2 | HEIGHT: 69 IN | WEIGHT: 170 LBS

## 2025-08-04 DIAGNOSIS — Z87.891 PERSONAL HISTORY OF TOBACCO USE, PRESENTING HAZARDS TO HEALTH: Primary | ICD-10-CM

## 2025-08-04 DIAGNOSIS — F17.210 CIGARETTE SMOKER: ICD-10-CM

## 2025-08-04 DIAGNOSIS — Z12.2 SCREENING FOR MALIGNANT NEOPLASM OF RESPIRATORY ORGAN: ICD-10-CM

## 2025-08-04 NOTE — TELEPHONE ENCOUNTER
Last LS on 8/19/24.  Patient has been screened by navigator and has been transferred to scheduling.

## 2025-08-30 ENCOUNTER — HOSPITAL ENCOUNTER (OUTPATIENT)
Dept: RADIOLOGY | Age: 70
Discharge: HOME | End: 2025-08-30
Attending: INTERNAL MEDICINE
Payer: COMMERCIAL

## 2025-08-30 DIAGNOSIS — Z12.2 SCREENING FOR MALIGNANT NEOPLASM OF RESPIRATORY ORGAN: ICD-10-CM

## 2025-08-30 DIAGNOSIS — Z87.891 PERSONAL HISTORY OF TOBACCO USE, PRESENTING HAZARDS TO HEALTH: ICD-10-CM

## 2025-08-30 DIAGNOSIS — F17.210 CIGARETTE SMOKER: ICD-10-CM

## 2025-08-30 PROCEDURE — 71271 CT THORAX LUNG CANCER SCR C-: CPT

## 2025-09-02 RX ORDER — ROSUVASTATIN CALCIUM 20 MG/1
20 TABLET, COATED ORAL DAILY
Qty: 90 TABLET | Refills: 0 | Status: SHIPPED | OUTPATIENT
Start: 2025-09-02

## (undated) DEVICE — SEAL UNIVERSAL 5MM-8MM XI

## (undated) DEVICE — SOLN IV 0.9% NSS 1000ML

## (undated) DEVICE — GAUZE 8X4 16 PLY RFID DOUBLE XRAY

## (undated) DEVICE — CORD LAPAROSCOPIC DISP STERILE

## (undated) DEVICE — TROCAR HASSON 12MM

## (undated) DEVICE — STAPLER SKIN 35W PROXIMATE PLUS

## (undated) DEVICE — SYRINGE DISP LUER-LOK 30 CC

## (undated) DEVICE — PENCIL ESU HANDSWITCHING W/HOL

## (undated) DEVICE — EVACUATOR CLOSED SUCTION 100C

## (undated) DEVICE — ***USE 22149*** SCISSOR DIRECT DRIVE 5MMX35CM DISP

## (undated) DEVICE — PACK UNIVERSAL

## (undated) DEVICE — APPLICATOR CHLORAPREP 26ML ORANGE TINT

## (undated) DEVICE — NEEDLE DISP HYPO 25GX1-1/2IN

## (undated) DEVICE — BAG URINARY DRAINAGE

## (undated) DEVICE — PACK UNIVERSAL ULTRAGARD 5/CS

## (undated) DEVICE — SUTURE MONOCRYL 4-0 PS-1 Y682H

## (undated) DEVICE — SOLN IRRIG .9%SOD 1000ML

## (undated) DEVICE — SYRINGE 10CC CONTROL LL

## (undated) DEVICE — ***USE 149534*** FOG-OUT ANTI-FOG MEDC

## (undated) DEVICE — SYSTEM VISUALIZATION CLEARIFY

## (undated) DEVICE — ***USE 69386*** APPLIER EPIX UNIVERSAL CLIP 5MM M/L

## (undated) DEVICE — SPONGE GAUZE 4X4 4 PLY-2S

## (undated) DEVICE — GLOVE SZ 8 LINER PROTEXIS PI BL

## (undated) DEVICE — STRYKEFLOW 2 W/ DISP TIP

## (undated) DEVICE — NEEDLE INSUFFLATION 120MM

## (undated) DEVICE — TROCAR 1ST ENTRY 5 X 100MM ADV Z-THREAD

## (undated) DEVICE — ***USE 56941*** SUTURE VICRYL 0 J603H UR-6

## (undated) DEVICE — MANIFOLD FOUR PORT NEPTUNE

## (undated) DEVICE — TUBING EXTENSION 8IN

## (undated) DEVICE — COVER MAYO STAND ST 30/CS

## (undated) DEVICE — GOWN SURG STRL W/TWL XLG

## (undated) DEVICE — SET AIRSEAL FILTERED TUBE SET

## (undated) DEVICE — MASTISOL LIQUID ADHESIVE

## (undated) DEVICE — EVACUATOR PLUME-AWAY LAP SMOKE PKG

## (undated) DEVICE — SUTURE VLOC 3-0 90 VIOLET 1X9 V-20

## (undated) DEVICE — PARTICLES HEMOSTATIC ARISTA 1 GRAM

## (undated) DEVICE — ***USE 143206***SYRINGE BULB

## (undated) DEVICE — Device

## (undated) DEVICE — DRAIN FLAT 7MM

## (undated) DEVICE — SYRINGE DISP LUER-LOK 10 CC

## (undated) DEVICE — SOLN IRRIG STER WATER 1500ML

## (undated) DEVICE — SUTURE MONOSOF 2-0 BLACK 1X18 C-15

## (undated) DEVICE — OINTMENT SURGILUBE 4OZ TUBE

## (undated) DEVICE — TUBING INSUFFLATION PNEUMOSURE HEATED HIGH FLOW

## (undated) DEVICE — DRAPE ARM 4 EXTENSION DAVINCI X

## (undated) DEVICE — TROCAR SLEEVE 5MM

## (undated) DEVICE — APPLICATOR TISSEEL SPRAY TUBING

## (undated) DEVICE — SUTURE MAXON 0 GREEN 1X30 GS-22

## (undated) DEVICE — PAD GROUND ELECTROSURGICAL W/CORD

## (undated) DEVICE — TOOMEY SYRINGE, 70CC STERILE

## (undated) DEVICE — DRESSING SPONGE ALL GAUZE 4X4 STER 10PK

## (undated) DEVICE — SOLN IRRIG .9%SOD 3L

## (undated) DEVICE — STERISTRIP 1/2INX4IN

## (undated) DEVICE — ENDOCATCH 10MM 173050G

## (undated) DEVICE — STATLOCK FOLEY CATHETER

## (undated) DEVICE — PORT ACCESS 12MM AIRSEAL 120MM

## (undated) DEVICE — GLOVE PROTEXIS PI ORTHO 8.0

## (undated) DEVICE — SUTURE VLOC 3-0 90 VIOLET 1X6 V-20

## (undated) DEVICE — NEEDLE HYPODERMIC PRO 18G X 1 1/2 IN

## (undated) DEVICE — PENEVAC1 NONSTICK SMOKE EVAC

## (undated) DEVICE — DRESSING COVADERM 2IN X 2IN

## (undated) DEVICE — DRESSING TEGADERM 4X4 3/4

## (undated) DEVICE — BLADE CLIPPER DISPOSABLE

## (undated) DEVICE — PAD POSITIONING XL

## (undated) DEVICE — APPLICATOR ARISTA FLEXITIP XL 38CM

## (undated) DEVICE — GLOVE SZ 7.5 MICRO PROTEXIS LATEX

## (undated) DEVICE — HEMOSTAT SURGICEL ABSORBABLE 4 X 8

## (undated) DEVICE — LABEL MEDICATION GEN/GYN

## (undated) DEVICE — SUTURE VLOC 2-0 90 VIOLET 1X12 GS-21

## (undated) DEVICE — COVER LIGHTHANDLE

## (undated) DEVICE — TUBING SMOKE EVAC PENCIL COATED

## (undated) DEVICE — BLADE SCALPEL #15

## (undated) DEVICE — SPONGE LAP 18X18 SAFE-T RFID ENHANCED XRAY

## (undated) DEVICE — GOWN SIRUS NONRNF RAGLAN XL ST 30/CS

## (undated) DEVICE — TAPE ADHESIVE 3IN

## (undated) DEVICE — SOLN IRRIG STER WATER 500ML

## (undated) DEVICE — NEEDLE BOX CONVENIENCE KIT

## (undated) DEVICE — SET LF NONVENTED T-U-R

## (undated) DEVICE — TISSEEL PRIMA RTU KIT 10ML

## (undated) DEVICE — SUTURE VICRYL 3-0 J416H SH UNDYED

## (undated) DEVICE — SUTURE POLYSORB 0 VIOLET 1X30 GU-46

## (undated) DEVICE — SUTURE VLOC 3-0 90 UNDYED 1X6 CV-23

## (undated) DEVICE — ADAPTER SUCTION

## (undated) DEVICE — DRAPE ARM DAVINCI XI

## (undated) DEVICE — DRESSING SPONGE GAUZE 4X4 STER

## (undated) DEVICE — SPONGE LAP 4X18 SAFE-T RFID ENHANCED XRAY

## (undated) DEVICE — TISSEEL PRIMA RTU KIT 4ML

## (undated) DEVICE — SUTURE SOFSILK 2-0 BLACK 1X30 SC-2

## (undated) DEVICE — SHEARS TIP COVER DAVINCI ONE USE

## (undated) DEVICE — SYRINGE DISP LUER-LOK 20 CC

## (undated) DEVICE — CONTAINER SPECIMEN 4OZ